# Patient Record
Sex: FEMALE | Race: WHITE | NOT HISPANIC OR LATINO | Employment: FULL TIME | URBAN - METROPOLITAN AREA
[De-identification: names, ages, dates, MRNs, and addresses within clinical notes are randomized per-mention and may not be internally consistent; named-entity substitution may affect disease eponyms.]

---

## 2018-04-21 ENCOUNTER — OFFICE VISIT (OUTPATIENT)
Dept: URGENT CARE | Facility: CLINIC | Age: 43
End: 2018-04-21
Payer: COMMERCIAL

## 2018-04-21 VITALS
BODY MASS INDEX: 34.86 KG/M2 | SYSTOLIC BLOOD PRESSURE: 122 MMHG | HEART RATE: 84 BPM | OXYGEN SATURATION: 96 % | DIASTOLIC BLOOD PRESSURE: 76 MMHG | TEMPERATURE: 99.2 F | WEIGHT: 230 LBS | HEIGHT: 68 IN

## 2018-04-21 DIAGNOSIS — J20.8 ACUTE BACTERIAL BRONCHITIS: Primary | ICD-10-CM

## 2018-04-21 DIAGNOSIS — B96.89 ACUTE BACTERIAL BRONCHITIS: Primary | ICD-10-CM

## 2018-04-21 PROCEDURE — 99203 OFFICE O/P NEW LOW 30 MIN: CPT | Performed by: FAMILY MEDICINE

## 2018-04-21 RX ORDER — LEVOTHYROXINE SODIUM 0.03 MG/1
25 TABLET ORAL DAILY
COMMUNITY

## 2018-04-21 RX ORDER — AZITHROMYCIN 250 MG/1
TABLET, FILM COATED ORAL
Qty: 6 TABLET | Refills: 0 | Status: SHIPPED | OUTPATIENT
Start: 2018-04-21 | End: 2018-04-26

## 2018-04-21 RX ORDER — ACETAMINOPHEN AND CODEINE PHOSPHATE 120; 12 MG/5ML; MG/5ML
1 SOLUTION ORAL DAILY
COMMUNITY

## 2018-04-21 RX ORDER — CABERGOLINE 0.5 MG/1
0.25 TABLET ORAL 2 TIMES WEEKLY
COMMUNITY

## 2018-04-21 NOTE — PROGRESS NOTES
[facility]        NAME: Aleisha Fox is a 38 y o  female  : 1975    MRN: [id number]  DATE: 2018  TIME: 12:04 PM    Assessment and Plan   Acute bacterial bronchitis [J20 8, B96 89]  1  Acute bacterial bronchitis  azithromycin (ZITHROMAX) 250 mg tablet         Patient Instructions     Continue plain Mucinex  Follow up with PCP in 3-5 days  Proceed to  ER if symptoms worsen  Chief Complaint     Chief Complaint   Patient presents with    Cough     x1 week          History of Present Illness       Cough   This is a new problem  The current episode started in the past 7 days  The problem has been gradually worsening  The problem occurs constantly  The cough is productive of sputum  Associated symptoms include chills, nasal congestion, postnasal drip, rhinorrhea and a sore throat  Review of Systems   Review of Systems   Constitutional: Positive for chills  HENT: Positive for postnasal drip, rhinorrhea and sore throat  Eyes: Negative  Respiratory: Positive for cough  Cardiovascular: Negative  Gastrointestinal: Negative  Musculoskeletal: Negative            Current Medications       Current Outpatient Prescriptions:     cabergoline (DOSTINEX) 0 5 MG tablet, Take 0 25 mg by mouth 2 (two) times a week, Disp: , Rfl:     levothyroxine 25 mcg tablet, Take 25 mcg by mouth daily, Disp: , Rfl:     norethindrone (MICRONOR) 0 35 MG tablet, Take 1 tablet by mouth daily, Disp: , Rfl:     azithromycin (ZITHROMAX) 250 mg tablet, Take 2 tablets today then 1 tablet daily x 4 days, Disp: 6 tablet, Rfl: 0    Current Allergies     Allergies as of 2018 - Reviewed 2018   Allergen Reaction Noted    Penicillins Hives 2018            The following portions of the patient's history were reviewed and updated as appropriate: allergies, current medications, past family history, past medical history, past social history, past surgical history and problem list      History reviewed  No pertinent past medical history  History reviewed  No pertinent surgical history  Family History   Problem Relation Age of Onset    Family history unknown: Yes         Medications have been verified  Objective   /76   Pulse 84   Temp 99 2 °F (37 3 °C) (Tympanic)   Ht 5' 8" (1 727 m)   Wt 104 kg (230 lb)   SpO2 96%   BMI 34 97 kg/m²        Physical Exam     Physical Exam   Constitutional: She is oriented to person, place, and time  She appears well-developed  HENT:   Right Ear: External ear normal    Left Ear: External ear normal    Nose: Nose normal    Mouth/Throat: Oropharynx is clear and moist  No oropharyngeal exudate  Eyes: Conjunctivae are normal    Neck: Normal range of motion  Neck supple  Cardiovascular: Normal rate, regular rhythm and normal heart sounds  No murmur heard  Pulmonary/Chest: Effort normal and breath sounds normal  No respiratory distress  She has no wheezes  She has no rales  She exhibits no tenderness  Abdominal: Soft  She exhibits no distension and no mass  There is no tenderness  There is no rebound and no guarding  Musculoskeletal: Normal range of motion  Lymphadenopathy:     She has no cervical adenopathy  Neurological: She is alert and oriented to person, place, and time  No cranial nerve deficit  Skin: Skin is warm  No rash noted  No erythema

## 2018-04-21 NOTE — PATIENT INSTRUCTIONS
Continue plain Mucinex  Follow up with PCP in 3-5 days  Proceed to  ER if symptoms worsen  Acute Bronchitis   AMBULATORY CARE:   Acute bronchitis  is swelling and irritation in the air passages of your lungs  This irritation may cause you to cough or have other breathing problems  Acute bronchitis often starts because of another illness, such as a cold or the flu  The illness spreads from your nose and throat to your windpipe and airways  Bronchitis is often called a chest cold  Acute bronchitis lasts about 3 to 6 weeks and is usually not a serious illness  Your cough can last for several weeks  You may have any of the following symptoms:   · A cough with sputum that may be clear, yellow, or green    · Feeling more tired than usual, and body aches    · A fever and chills    · Wheezing when you breathe    · A tight chest or pain when you breathe or cough  Seek care immediately if:   · You cough up blood  · Your lips or fingernails turn blue  · You feel like you are not getting enough air when you breathe  Contact your healthcare provider if:   · You have a fever  · Your breathing problems do not go away or get worse  · Your cough does not get better within 4 weeks  · You have questions or concerns about your condition or care  Self-care:   · Get more rest   Rest helps your body to heal  Slowly start to do more each day  Rest when you feel it is needed  · Avoid irritants in the air  Avoid chemicals, fumes, and dust  Wear a face mask if you must work around dust or fumes  Stay inside on days when air pollution levels are high  If you have allergies, stay inside when pollen counts are high  Do not use aerosol products, such as spray-on deodorant, bug spray, and hair spray  · Do not smoke or be around others who smoke  Nicotine and other chemicals in cigarettes and cigars damages the cilia that move mucus out of your lungs   Ask your healthcare provider for information if you currently smoke and need help to quit  E-cigarettes or smokeless tobacco still contain nicotine  Talk to your healthcare provider before you use these products  · Drink liquids as directed  Liquids help keep your air passages moist and help you cough up mucus  You may need to drink more liquids when you have acute bronchitis  Ask how much liquid to drink each day and which liquids are best for you  · Use a humidifier or vaporizer  Use a cool mist humidifier or a vaporizer to increase air moisture in your home  This may make it easier for you to breathe and help decrease your cough  Prevent acute bronchitis by doing the following:   · Get the vaccinations you need  Ask your healthcare provider if you should get vaccinated against the flu or pneumonia  · Prevent the spread of germs  You can decrease your risk of acute bronchitis and other illnesses by doing the following:     [facility] your hands often with soap and water  Carry germ-killing hand lotion or gel with you  You can use the lotion or gel to clean your hands when soap and water are not available  ¨ Do not touch your eyes, nose, or mouth unless you have washed your hands first     ¨ Always cover your mouth when you cough to prevent the spread of germs  It is best to cough into a tissue or your shirt sleeve instead of into your hand  Ask those around you cover their mouths when they cough  ¨ Try to avoid people who have a cold or the flu  If you are sick, stay away from others as much as possible  Medicines: Your healthcare provider may  give you any of the following:  · Ibuprofen or acetaminophen  are medicines that help lower your fever  They are available without a doctor's order  Ask your healthcare provider which medicine is right for you  Ask how much to take and how often to take it  Follow directions  These medicines can cause stomach bleeding if not taken correctly  Ibuprofen can cause kidney damage   Do not take ibuprofen if you have kidney disease, an ulcer, or allergies to aspirin  Acetaminophen can cause liver damage  Do not take more than 4,000 milligrams in 24 hours  · Decongestants  help loosen mucus in your lungs and make it easier to cough up  This can help you breathe easier  · Cough suppressants  decrease your urge to cough  If your cough produces mucus, do not take a cough suppressant unless your healthcare provider tells you to  Your healthcare provider may suggest that you take a cough suppressant at night so you can rest     · Inhalers  may be given  Your healthcare provider may give you one or more inhalers to help you breathe easier and cough less  An inhaler gives your medicine to open your airways  Ask your healthcare provider to show you how to use your inhaler correctly  Follow up with your healthcare provider as directed:  Write down questions you have so you will remember to ask them during your follow-up visits  © 2017 [employer] Information is for End User's use only and may not be sold, redistributed or otherwise used for commercial purposes  All illustrations and images included in CareNotes® are the copyrighted property of [employer]  or Tyshawn Araujo  The above information is an  only  It is not intended as medical advice for individual conditions or treatments  Talk to your doctor, nurse or pharmacist before following any medical regimen to see if it is safe and effective for you

## 2018-07-05 ENCOUNTER — OFFICE VISIT (OUTPATIENT)
Dept: URGENT CARE | Facility: CLINIC | Age: 43
End: 2018-07-05
Payer: COMMERCIAL

## 2018-07-05 VITALS
SYSTOLIC BLOOD PRESSURE: 128 MMHG | HEART RATE: 73 BPM | OXYGEN SATURATION: 99 % | TEMPERATURE: 99.9 F | WEIGHT: 230 LBS | DIASTOLIC BLOOD PRESSURE: 61 MMHG | HEIGHT: 68 IN | BODY MASS INDEX: 34.86 KG/M2 | RESPIRATION RATE: 18 BRPM

## 2018-07-05 DIAGNOSIS — H66.93 BILATERAL OTITIS MEDIA, UNSPECIFIED OTITIS MEDIA TYPE: Primary | ICD-10-CM

## 2018-07-05 PROCEDURE — 99213 OFFICE O/P EST LOW 20 MIN: CPT | Performed by: PHYSICIAN ASSISTANT

## 2018-07-05 RX ORDER — AZITHROMYCIN 250 MG/1
TABLET, FILM COATED ORAL
Qty: 6 TABLET | Refills: 0 | Status: SHIPPED | OUTPATIENT
Start: 2018-07-05 | End: 2018-07-09

## 2018-07-05 NOTE — PROGRESS NOTES
[facility]        NAME: Scott Vizcaino is a 38 y o  female  : 1975    MRN: [id number]  DATE: 2018  TIME: 6:29 PM    Assessment and Plan   Bilateral otitis media, unspecified otitis media type [H66 93]  1  Bilateral otitis media, unspecified otitis media type  azithromycin (ZITHROMAX) 250 mg tablet     Patient Instructions     Take antibiotic as prescribed  Follow up with PCP in 3-5 days  Proceed to  ER if symptoms worsen  Chief Complaint     Chief Complaint   Patient presents with    Cold Like Symptoms     cough and congestion resolving, now with bilateral ear pain  History of Present Illness     Earache    There is pain in both ears  This is a new problem  The current episode started today  The problem occurs constantly  The problem has been gradually worsening  There has been no fever  The pain is moderate  Associated symptoms include coughing, rhinorrhea and a sore throat  Pertinent negatives include no abdominal pain, diarrhea, ear discharge, headaches, hearing loss, neck pain, rash or vomiting  She has tried nothing for the symptoms  The treatment provided no relief  There is no history of a chronic ear infection, hearing loss or a tympanostomy tube  Review of Systems   Review of Systems   HENT: Positive for ear pain, rhinorrhea and sore throat  Negative for ear discharge and hearing loss  Respiratory: Positive for cough  Negative for apnea, choking, chest tightness, shortness of breath, wheezing and stridor  Gastrointestinal: Negative for abdominal pain, diarrhea and vomiting  Musculoskeletal: Negative for neck pain  Skin: Negative for rash  Neurological: Negative for headaches           Current Medications       Current Outpatient Prescriptions:     azithromycin (ZITHROMAX) 250 mg tablet, Take 2 tablets today then 1 tablet daily x 4 days, Disp: 6 tablet, Rfl: 0    cabergoline (DOSTINEX) 0 5 MG tablet, Take 0 25 mg by mouth 2 (two) times a week, Disp: , Rfl:   levothyroxine 25 mcg tablet, Take 25 mcg by mouth daily, Disp: , Rfl:     norethindrone (MICRONOR) 0 35 MG tablet, Take 1 tablet by mouth daily, Disp: , Rfl:     Current Allergies     Allergies as of 07/05/2018 - Reviewed 07/05/2018   Allergen Reaction Noted    Penicillins Hives 04/21/2018            The following portions of the patient's history were reviewed and updated as appropriate: allergies, current medications, past family history, past medical history, past social history, past surgical history and problem list      Past Medical History:   Diagnosis Date    Disease of thyroid gland     Pulmonary embolism ([address] )        History reviewed  No pertinent surgical history  Family History   Problem Relation Age of Onset    Family history unknown: Yes         Medications have been verified  Objective   /61   Pulse 73   Temp 99 9 °F (37 7 °C) (Tympanic)   Resp 18   Ht 5' 8" (1 727 m)   Wt 104 kg (230 lb)   LMP 06/20/2018   SpO2 99%   BMI 34 97 kg/m²        Physical Exam     Physical Exam   Constitutional: She appears well-developed and well-nourished  HENT:   Head: Normocephalic  Right Ear: External ear normal  No drainage, swelling or tenderness  Tympanic membrane is erythematous and bulging  Left Ear: External ear normal  No drainage, swelling or tenderness  Tympanic membrane is erythematous and bulging  Nose: Mucosal edema and rhinorrhea present  Mouth/Throat: Oropharynx is clear and moist  No oropharyngeal exudate  Cardiovascular: Normal rate, regular rhythm, normal heart sounds and intact distal pulses  Exam reveals no gallop and no friction rub  No murmur heard  Pulmonary/Chest: Effort normal and breath sounds normal  No respiratory distress  She has no wheezes  She has no rales  Abdominal: Soft  Bowel sounds are normal  She exhibits no distension  There is no tenderness  There is no rebound

## 2018-07-10 ENCOUNTER — OFFICE VISIT (OUTPATIENT)
Dept: URGENT CARE | Facility: CLINIC | Age: 43
End: 2018-07-10
Payer: COMMERCIAL

## 2018-07-10 VITALS
SYSTOLIC BLOOD PRESSURE: 132 MMHG | TEMPERATURE: 100 F | RESPIRATION RATE: 18 BRPM | WEIGHT: 229.28 LBS | HEIGHT: 68 IN | HEART RATE: 85 BPM | BODY MASS INDEX: 34.75 KG/M2 | DIASTOLIC BLOOD PRESSURE: 74 MMHG | OXYGEN SATURATION: 99 %

## 2018-07-10 DIAGNOSIS — J20.9 ACUTE BRONCHITIS, UNSPECIFIED ORGANISM: Primary | ICD-10-CM

## 2018-07-10 PROCEDURE — 99213 OFFICE O/P EST LOW 20 MIN: CPT | Performed by: PHYSICIAN ASSISTANT

## 2018-07-10 RX ORDER — PREDNISONE 50 MG/1
50 TABLET ORAL DAILY
Qty: 5 TABLET | Refills: 0 | Status: SHIPPED | OUTPATIENT
Start: 2018-07-10 | End: 2018-07-15

## 2018-07-10 RX ORDER — DOXYCYCLINE 100 MG/1
100 TABLET ORAL 2 TIMES DAILY
Qty: 20 TABLET | Refills: 0 | Status: SHIPPED | OUTPATIENT
Start: 2018-07-10 | End: 2018-07-20

## 2018-07-10 RX ORDER — ALBUTEROL SULFATE 90 UG/1
1 AEROSOL, METERED RESPIRATORY (INHALATION) EVERY 4 HOURS PRN
Qty: 1 INHALER | Refills: 0 | Status: SHIPPED | OUTPATIENT
Start: 2018-07-10 | End: 2018-07-20

## 2018-07-10 NOTE — PROGRESS NOTES
[facility]        NAME: Kizzy Love is a 38 y o  female  : 1975    MRN: [id number]  DATE: July 10, 2018  TIME: 2:22 PM    Assessment and Plan   Acute bronchitis, unspecified organism [J20 9]  1  Acute bronchitis, unspecified organism  doxycycline (ADOXA) 100 MG tablet    predniSONE 50 mg tablet    albuterol (PROVENTIL HFA,VENTOLIN HFA) 90 mcg/act inhaler     Patient Instructions     Take medicine as prescribed  Follow up with PCP in 3-5 days  Proceed to  ER if symptoms worsen  Chief Complaint     Chief Complaint   Patient presents with    Cold Like Symptoms     cough, fever, chills     History of Present Illness       Cough   This is a new problem  The current episode started yesterday  The problem has been gradually worsening  The problem occurs every few minutes  The cough is non-productive  Associated symptoms include nasal congestion, rhinorrhea and wheezing  Pertinent negatives include no chest pain, chills, ear congestion, ear pain, fever, headaches, heartburn, hemoptysis, myalgias, postnasal drip, rash, sore throat, shortness of breath, sweats or weight loss  Nothing aggravates the symptoms  She has tried nothing for the symptoms  The treatment provided moderate relief  There is no history of asthma, bronchiectasis, bronchitis, COPD, emphysema, environmental allergies or pneumonia  Review of Systems   Review of Systems   Constitutional: Negative for chills, fever and weight loss  HENT: Positive for congestion and rhinorrhea  Negative for ear pain, postnasal drip and sore throat  Respiratory: Positive for cough, chest tightness and wheezing  Negative for apnea, hemoptysis, choking, shortness of breath and stridor  Cardiovascular: Negative for chest pain  Gastrointestinal: Negative for heartburn  Musculoskeletal: Negative for myalgias  Skin: Negative for rash  Allergic/Immunologic: Negative for environmental allergies  Neurological: Negative for headaches  Current Medications       Current Outpatient Prescriptions:     albuterol (PROVENTIL HFA,VENTOLIN HFA) 90 mcg/act inhaler, Inhale 1 puff every 4 (four) hours as needed for wheezing for up to 10 days, Disp: 1 Inhaler, Rfl: 0    cabergoline (DOSTINEX) 0 5 MG tablet, Take 0 25 mg by mouth 2 (two) times a week, Disp: , Rfl:     doxycycline (ADOXA) 100 MG tablet, Take 1 tablet (100 mg total) by mouth 2 (two) times a day for 10 days, Disp: 20 tablet, Rfl: 0    levothyroxine 25 mcg tablet, Take 25 mcg by mouth daily, Disp: , Rfl:     norethindrone (MICRONOR) 0 35 MG tablet, Take 1 tablet by mouth daily, Disp: , Rfl:     predniSONE 50 mg tablet, Take 1 tablet (50 mg total) by mouth daily for 5 days, Disp: 5 tablet, Rfl: 0    Current Allergies     Allergies as of 07/10/2018 - Reviewed 07/10/2018   Allergen Reaction Noted    Penicillins Hives 04/21/2018            The following portions of the patient's history were reviewed and updated as appropriate: allergies, current medications, past family history, past medical history, past social history, past surgical history and problem list      Past Medical History:   Diagnosis Date    Disease of thyroid gland     Pulmonary embolism ([address] )        History reviewed  No pertinent surgical history  Family History   Problem Relation Age of Onset    Family history unknown: Yes         Medications have been verified  Objective   /74   Pulse 85   Temp 100 °F (37 8 °C) (Tympanic)   Resp 18   Ht 5' 8" (1 727 m)   Wt 104 kg (229 lb 4 5 oz)   LMP 06/20/2018   SpO2 99%   BMI 34 86 kg/m²        Physical Exam     Physical Exam   Constitutional: She appears well-developed and well-nourished  HENT:   Head: Normocephalic  Right Ear: Hearing, tympanic membrane, external ear and ear canal normal    Left Ear: Hearing, tympanic membrane, external ear and ear canal normal    Nose: Mucosal edema and rhinorrhea present     Mouth/Throat: Posterior oropharyngeal erythema present  No oropharyngeal exudate or posterior oropharyngeal edema  Cardiovascular: Normal rate, regular rhythm, normal heart sounds and intact distal pulses  Exam reveals no gallop and no friction rub  No murmur heard  Pulmonary/Chest: Effort normal  No respiratory distress  She has no decreased breath sounds  She has wheezes (diffuse)  She has no rhonchi  She has no rales  She exhibits no tenderness  Moist cough   Abdominal: Soft  Bowel sounds are normal  She exhibits no distension  There is no tenderness  There is no rebound and no guarding

## 2018-08-19 ENCOUNTER — OFFICE VISIT (OUTPATIENT)
Dept: URGENT CARE | Facility: CLINIC | Age: 43
End: 2018-08-19
Payer: COMMERCIAL

## 2018-08-19 VITALS
SYSTOLIC BLOOD PRESSURE: 154 MMHG | OXYGEN SATURATION: 98 % | RESPIRATION RATE: 18 BRPM | HEIGHT: 68 IN | WEIGHT: 229 LBS | TEMPERATURE: 99.1 F | HEART RATE: 59 BPM | BODY MASS INDEX: 34.71 KG/M2 | DIASTOLIC BLOOD PRESSURE: 74 MMHG

## 2018-08-19 DIAGNOSIS — S56.011S: Primary | ICD-10-CM

## 2018-08-19 PROCEDURE — 99213 OFFICE O/P EST LOW 20 MIN: CPT | Performed by: FAMILY MEDICINE

## 2018-08-19 RX ORDER — CELECOXIB 200 MG/1
200 CAPSULE ORAL DAILY
Qty: 14 CAPSULE | Refills: 0 | Status: SHIPPED | OUTPATIENT
Start: 2018-08-19 | End: 2021-01-26

## 2018-08-19 NOTE — PROGRESS NOTES
Assessment/Plan:     I recommend a thumb spica splint  Rest the thumbs much as possible  Follow up with family doctor if not a lot better in 2 weeks  Consider orthopedic referral      Diagnoses and all orders for this visit:    Strain of flexor muscle, fascia and tendon of right thumb at forearm level, sequela  -     celecoxib (CeleBREX) 200 mg capsule; Take 1 capsule (200 mg total) by mouth daily for 14 days          Subjective:      Patient ID: Stevo Mims is a 38 y o  female  Complains of pain in the radial aspect of the right wrist at the radiocarpal junction  This is been going on for about 2 weeks  No known trauma  She work on a computer 8 hours a day  She has been doing any heavy lifting  The following portions of the patient's history were reviewed and updated as appropriate: allergies, current medications, past family history, past medical history, past social history, past surgical history and problem list     Review of Systems   Constitutional: Negative  HENT: Negative  Eyes: Negative  Respiratory: Negative  Cardiovascular: Negative  Gastrointestinal: Negative  Endocrine: Negative  Genitourinary: Negative  Musculoskeletal: Positive for arthralgias and joint swelling  Skin: Negative  Allergic/Immunologic: Negative  Neurological: Negative  Hematological: Negative  Psychiatric/Behavioral: Negative  All other systems reviewed and are negative  Objective:      /74   Pulse 59   Temp 99 1 °F (37 3 °C) (Tympanic)   Resp 18   Ht 5' 8" (1 727 m)   Wt 104 kg (229 lb)   SpO2 98%   BMI 34 82 kg/m²          Physical Exam   Constitutional: She is oriented to person, place, and time  She appears well-developed and well-nourished  HENT:   Head: Normocephalic and atraumatic     Right Ear: External ear normal    Left Ear: External ear normal    Nose: Nose normal    Mouth/Throat: Oropharynx is clear and moist    Eyes: Conjunctivae and EOM are normal  Pupils are equal, round, and reactive to light  Neck: Normal range of motion  Neck supple  Cardiovascular: Normal rate, regular rhythm and normal heart sounds  Pulmonary/Chest: Effort normal and breath sounds normal    Abdominal: Soft  Bowel sounds are normal    Musculoskeletal: Normal range of motion  There is tenderness over the right first MTP and radial carpal junction  Neurological: She is alert and oriented to person, place, and time  She has normal reflexes  Skin: Skin is warm and dry  Psychiatric: She has a normal mood and affect  Her behavior is normal    Nursing note and vitals reviewed

## 2020-02-22 ENCOUNTER — OFFICE VISIT (OUTPATIENT)
Dept: URGENT CARE | Facility: CLINIC | Age: 45
End: 2020-02-22
Payer: COMMERCIAL

## 2020-02-22 VITALS
RESPIRATION RATE: 16 BRPM | DIASTOLIC BLOOD PRESSURE: 82 MMHG | HEIGHT: 67 IN | WEIGHT: 245 LBS | TEMPERATURE: 99.6 F | BODY MASS INDEX: 38.45 KG/M2 | HEART RATE: 70 BPM | SYSTOLIC BLOOD PRESSURE: 130 MMHG | OXYGEN SATURATION: 97 %

## 2020-02-22 DIAGNOSIS — R51.9 SINUS HEADACHE: Primary | ICD-10-CM

## 2020-02-22 PROCEDURE — 99213 OFFICE O/P EST LOW 20 MIN: CPT | Performed by: EMERGENCY MEDICINE

## 2020-02-22 RX ORDER — PREDNISONE 10 MG/1
TABLET ORAL
Qty: 27 TABLET | Refills: 0 | Status: SHIPPED | OUTPATIENT
Start: 2020-02-22 | End: 2021-01-26

## 2020-02-22 NOTE — PATIENT INSTRUCTIONS
Take an expectorant - guaifenesin should be the only ingredient - during the day, and the cough suppressant (ex  Robitussin DM or Tessalon) if needed at night only  Take Zinc 12 5 to 15 mg every 2 - 3 hrs while awake for the next few days  You may take Cold Kuldeep (13 3 mg of Zinc) or split a 25 mg Zinc tablet or lozenge in two or a 50 mg into four to get the proper dose  The total daily dose of Zinc should exceed 75 mg per day  You may also take a decongestant like Sudafed, unless you have hypertension or cardiac disease  Hold any NSAIDs like Ibuprofen (Advil), Naprosyn (Aleve), etc while on steroids like Medrol or Prednisone  If you are diabetic, you should also adhere strictly to your diet and monitor your blood sugar closely while on the steroids as discussed  Acute Headache   WHAT YOU NEED TO KNOW:   An acute headache is pain or discomfort that starts suddenly and gets worse quickly  You may have an acute headache only when you feel stress or eat certain foods  Other acute headache pain can happen every day, and sometimes several times a day  DISCHARGE INSTRUCTIONS:   Return to the emergency department if:   · You have severe pain  · You have numbness or weakness on one side of your face or body  · You have a headache that occurs after a blow to the head, a fall, or other trauma  · You have a headache, are forgetful or confused, or have trouble speaking  · You have a headache, stiff neck, and a fever  Contact your healthcare provider if:   · You have a constant headache and are vomiting  · You have a headache each day that does not get better, even after treatment  · You have changes in your headaches, or new symptoms that occur when you have a headache  · You have questions or concerns about your condition or care  Medicines: You may need any of the following:  · Prescription pain medicine  may be given   The medicine your healthcare provider recommends will depend on the kind of headaches you have  You will need to take prescription headache medicines as directed to prevent a problem called rebound headache  These headaches happen with regular use of pain relievers for headache disorders  · NSAIDs , such as ibuprofen, help decrease swelling, pain, and fever  This medicine is available with or without a doctor's order  NSAIDs can cause stomach bleeding or kidney problems in certain people  If you take blood thinner medicine, always ask your healthcare provider if NSAIDs are safe for you  Always read the medicine label and follow directions  · Acetaminophen  decreases pain and fever  It is available without a doctor's order  Ask how much to take and how often to take it  Follow directions  Read the labels of all other medicines you are using to see if they also contain acetaminophen, or ask your doctor or pharmacist  Acetaminophen can cause liver damage if not taken correctly  Do not use more than 3 grams (3,000 milligrams) total of acetaminophen in one day  · Antidepressants  may be given for some kinds of headaches  · Take your medicine as directed  Contact your healthcare provider if you think your medicine is not helping or if you have side effects  Tell him or her if you are allergic to any medicine  Keep a list of the medicines, vitamins, and herbs you take  Include the amounts, and when and why you take them  Bring the list or the pill bottles to follow-up visits  Carry your medicine list with you in case of an emergency  Manage your symptoms:   · Apply heat or ice  on the headache area  Use a heat or ice pack  For an ice pack, you can also put crushed ice in a plastic bag  Cover the pack or bag with a towel before you apply it to your skin  Ice and heat both help decrease pain, and heat also helps decrease muscle spasms  Apply heat for 20 to 30 minutes every 2 hours  Apply ice for 15 to 20 minutes every hour   Apply heat or ice for as long and for as many days as directed  You may alternate heat and ice  · Relax your muscles  Lie down in a comfortable position and close your eyes  Relax your muscles slowly  Start at your toes and work your way up your body  · Keep a record of your headaches  Write down when your headaches start and stop  Include your symptoms and what you were doing when the headache began  Record what you ate or drank for 24 hours before the headache started  Describe the pain and where it hurts  Keep track of what you did to treat your headache and if it worked  Prevent an acute headache:   · Avoid anything that triggers an acute headache  Examples include exposure to chemicals, going to high altitude, or not getting enough sleep  Create a regular sleep routine  Go to sleep at the same time and wake up at the same time each day  Do not use electronic devices before bedtime  These may trigger a headache or prevent you from sleeping well  · Do not smoke  Nicotine and other chemicals in cigarettes and cigars can trigger an acute headache or make it worse  Ask your healthcare provider for information if you currently smoke and need help to quit  E-cigarettes or smokeless tobacco still contain nicotine  Talk to your healthcare provider before you use these products  · Limit alcohol as directed  Alcohol can trigger an acute headache or make it worse  If you have cluster headaches, do not drink alcohol during an episode  For other types of headaches, ask your healthcare provider if it is safe for you to drink alcohol  Ask how much is safe for you to drink, and how often  · Exercise as directed  Exercise can reduce tension and help with headache pain  Aim for 30 minutes of physical activity on most days of the week  Your healthcare provider can help you create an exercise plan  · Eat a variety of healthy foods  Healthy foods include fruits, vegetables, low-fat dairy products, lean meats, fish, whole grains, and cooked beans   Your healthcare provider or dietitian can help you create meals plans if you need to avoid foods that trigger headaches  Follow up with your healthcare provider as directed:  Bring your headache record with you when you see your healthcare provider  Write down your questions so you remember to ask them during your visits  © 2017 [employer] Information is for End User's use only and may not be sold, redistributed or otherwise used for commercial purposes  All illustrations and images included in CareNotes® are the copyrighted property of [employer]  or Tyshawn Araujo  The above information is an  only  It is not intended as medical advice for individual conditions or treatments  Talk to your doctor, nurse or pharmacist before following any medical regimen to see if it is safe and effective for you

## 2020-02-22 NOTE — PROGRESS NOTES
[facility]        NAME: Heide Haskins is a 53 y o  female  : 1975    MRN: [id number]  DATE: 2020  TIME: 9:07 AM    Assessment and Plan   Sinus headache [R51]  1  Sinus headache  predniSONE 10 mg tablet     I offered to send patient to the ER for further evaluation  She does not want to go to the ER at this time  Patient Instructions     Patient Instructions   Take an expectorant - guaifenesin should be the only ingredient - during the day, and the cough suppressant (ex  Robitussin DM or Tessalon) if needed at night only  Take Zinc 12 5 to 15 mg every 2 - 3 hrs while awake for the next few days  You may take Cold Kuldeep (13 3 mg of Zinc) or split a 25 mg Zinc tablet or lozenge in two or a 50 mg into four to get the proper dose  The total daily dose of Zinc should exceed 75 mg per day  You may also take a decongestant like Sudafed, unless you have hypertension or cardiac disease  Hold any NSAIDs like Ibuprofen (Advil), Naprosyn (Aleve), etc while on steroids like Medrol or Prednisone  If you are diabetic, you should also adhere strictly to your diet and monitor your blood sugar closely while on the steroids as discussed  Acute Headache   WHAT YOU NEED TO KNOW:   An acute headache is pain or discomfort that starts suddenly and gets worse quickly  You may have an acute headache only when you feel stress or eat certain foods  Other acute headache pain can happen every day, and sometimes several times a day  DISCHARGE INSTRUCTIONS:   Return to the emergency department if:   · You have severe pain  · You have numbness or weakness on one side of your face or body  · You have a headache that occurs after a blow to the head, a fall, or other trauma  · You have a headache, are forgetful or confused, or have trouble speaking  · You have a headache, stiff neck, and a fever  Contact your healthcare provider if:   · You have a constant headache and are vomiting      · You have a headache each day that does not get better, even after treatment  · You have changes in your headaches, or new symptoms that occur when you have a headache  · You have questions or concerns about your condition or care  Medicines: You may need any of the following:  · Prescription pain medicine  may be given  The medicine your healthcare provider recommends will depend on the kind of headaches you have  You will need to take prescription headache medicines as directed to prevent a problem called rebound headache  These headaches happen with regular use of pain relievers for headache disorders  · NSAIDs , such as ibuprofen, help decrease swelling, pain, and fever  This medicine is available with or without a doctor's order  NSAIDs can cause stomach bleeding or kidney problems in certain people  If you take blood thinner medicine, always ask your healthcare provider if NSAIDs are safe for you  Always read the medicine label and follow directions  · Acetaminophen  decreases pain and fever  It is available without a doctor's order  Ask how much to take and how often to take it  Follow directions  Read the labels of all other medicines you are using to see if they also contain acetaminophen, or ask your doctor or pharmacist  Acetaminophen can cause liver damage if not taken correctly  Do not use more than 3 grams (3,000 milligrams) total of acetaminophen in one day  · Antidepressants  may be given for some kinds of headaches  · Take your medicine as directed  Contact your healthcare provider if you think your medicine is not helping or if you have side effects  Tell him or her if you are allergic to any medicine  Keep a list of the medicines, vitamins, and herbs you take  Include the amounts, and when and why you take them  Bring the list or the pill bottles to follow-up visits  Carry your medicine list with you in case of an emergency    Manage your symptoms:   · Apply heat or ice  on the headache area  Use a heat or ice pack  For an ice pack, you can also put crushed ice in a plastic bag  Cover the pack or bag with a towel before you apply it to your skin  Ice and heat both help decrease pain, and heat also helps decrease muscle spasms  Apply heat for 20 to 30 minutes every 2 hours  Apply ice for 15 to 20 minutes every hour  Apply heat or ice for as long and for as many days as directed  You may alternate heat and ice  · Relax your muscles  Lie down in a comfortable position and close your eyes  Relax your muscles slowly  Start at your toes and work your way up your body  · Keep a record of your headaches  Write down when your headaches start and stop  Include your symptoms and what you were doing when the headache began  Record what you ate or drank for 24 hours before the headache started  Describe the pain and where it hurts  Keep track of what you did to treat your headache and if it worked  Prevent an acute headache:   · Avoid anything that triggers an acute headache  Examples include exposure to chemicals, going to high altitude, or not getting enough sleep  Create a regular sleep routine  Go to sleep at the same time and wake up at the same time each day  Do not use electronic devices before bedtime  These may trigger a headache or prevent you from sleeping well  · Do not smoke  Nicotine and other chemicals in cigarettes and cigars can trigger an acute headache or make it worse  Ask your healthcare provider for information if you currently smoke and need help to quit  E-cigarettes or smokeless tobacco still contain nicotine  Talk to your healthcare provider before you use these products  · Limit alcohol as directed  Alcohol can trigger an acute headache or make it worse  If you have cluster headaches, do not drink alcohol during an episode  For other types of headaches, ask your healthcare provider if it is safe for you to drink alcohol   Ask how much is safe for you to drink, and how often  · Exercise as directed  Exercise can reduce tension and help with headache pain  Aim for 30 minutes of physical activity on most days of the week  Your healthcare provider can help you create an exercise plan  · Eat a variety of healthy foods  Healthy foods include fruits, vegetables, low-fat dairy products, lean meats, fish, whole grains, and cooked beans  Your healthcare provider or dietitian can help you create meals plans if you need to avoid foods that trigger headaches  Follow up with your healthcare provider as directed:  Bring your headache record with you when you see your healthcare provider  Write down your questions so you remember to ask them during your visits  © 2017 [employer] Information is for End User's use only and may not be sold, redistributed or otherwise used for commercial purposes  All illustrations and images included in CareNotes® are the copyrighted property of [employer]  or Tyshawn Araujo  The above information is an  only  It is not intended as medical advice for individual conditions or treatments  Talk to your doctor, nurse or pharmacist before following any medical regimen to see if it is safe and effective for you  Follow up with PCP in 3-5 days  Proceed to  ER if symptoms worsen  Chief Complaint     Chief Complaint   Patient presents with    Headache     c/o 2 week hx int headache that started on top of head now c/o facial pain and pressure         History of Present Illness       Patient complains of waxing and waning bifrontal and bimaxillary headache for the past 2 weeks  She claims no relief with over-the-counter sinus medicines  She does not believe this is the worst headache of her life  She denies any recent head trauma  She denies stiff neck, fever or chills  Review of Systems   Review of Systems   Constitutional: Negative for activity change, chills, fatigue and fever  HENT: Positive for congestion and sinus pressure  Negative for sore throat  Eyes: Negative for photophobia  Respiratory: Negative for shortness of breath  Gastrointestinal: Negative for abdominal pain, blood in stool, nausea and vomiting  Endocrine: Negative for cold intolerance, heat intolerance, polydipsia, polyphagia and polyuria  Genitourinary: Negative for hematuria  Musculoskeletal: Negative for arthralgias, back pain, joint swelling, myalgias, neck pain and neck stiffness  Skin: Negative for color change, rash and wound  Neurological: Positive for headaches  Negative for dizziness, syncope, weakness and light-headedness           Current Medications       Current Outpatient Medications:     cabergoline (DOSTINEX) 0 5 MG tablet, Take 0 25 mg by mouth 2 (two) times a week, Disp: , Rfl:     levothyroxine 25 mcg tablet, Take 25 mcg by mouth daily, Disp: , Rfl:     norethindrone (MICRONOR) 0 35 MG tablet, Take 1 tablet by mouth daily, Disp: , Rfl:     celecoxib (CeleBREX) 200 mg capsule, Take 1 capsule (200 mg total) by mouth daily for 14 days, Disp: 14 capsule, Rfl: 0    predniSONE 10 mg tablet, Take once daily all days pills on this schedule 6- 6- 5- 4- 3- 2- 1, Disp: 27 tablet, Rfl: 0    Current Allergies     Allergies as of 02/22/2020 - Reviewed 02/22/2020   Allergen Reaction Noted    Penicillins Hives 04/21/2018            The following portions of the patient's history were reviewed and updated as appropriate: allergies, current medications, past family history, past medical history, past social history, past surgical history and problem list      Past Medical History:   Diagnosis Date    Disease of thyroid gland     Pituitary [facility] [address] [facility]     Pulmonary embolism ([facility] [address] )        Past Surgical History:   Procedure Laterality Date    NO PAST SURGERIES         Family History   Problem Relation Age of Onset    Diabetes Mother     No Known Problems Father          Medications have been verified  Objective   /82   Pulse 70   Temp 99 6 °F (37 6 °C) (Tympanic)   Resp 16   Ht 5' 7" (1 702 m)   Wt 111 kg (245 lb)   SpO2 97%   BMI 38 37 kg/m²        Physical Exam     Physical Exam   Constitutional: She is oriented to person, place, and time  She appears well-developed and well-nourished  No distress  HENT:   Head: Normocephalic and atraumatic  Right Ear: Tympanic membrane and external ear normal    Left Ear: Tympanic membrane and external ear normal    Nose: Mucosal edema present  Mouth/Throat: Posterior oropharyngeal erythema present  No oropharyngeal exudate or tonsillar abscesses  Eyes: Pupils are equal, round, and reactive to light  Conjunctivae are normal    Fundoscopic exam:       The right eye shows no hemorrhage and no papilledema  The left eye shows no hemorrhage and no papilledema  Discs flat with sharp borders, no hemorrhages or exudates  Neck: Neck supple  Cardiovascular: Normal rate and regular rhythm  Pulmonary/Chest: Effort normal    Abdominal: Soft  Bowel sounds are normal    Neurological: She is alert and oriented to person, place, and time  Skin: Skin is warm and dry  Nursing note and vitals reviewed

## 2021-01-10 ENCOUNTER — HOSPITAL ENCOUNTER (EMERGENCY)
Facility: HOSPITAL | Age: 46
Discharge: HOME | End: 2021-01-10
Attending: EMERGENCY MEDICINE | Admitting: EMERGENCY MEDICINE
Payer: COMMERCIAL

## 2021-01-10 ENCOUNTER — APPOINTMENT (EMERGENCY)
Dept: CT IMAGING | Facility: HOSPITAL | Age: 46
End: 2021-01-10
Payer: COMMERCIAL

## 2021-01-10 ENCOUNTER — APPOINTMENT (EMERGENCY)
Dept: RADIOLOGY | Facility: HOSPITAL | Age: 46
End: 2021-01-10
Payer: COMMERCIAL

## 2021-01-10 VITALS
WEIGHT: 228.18 LBS | HEART RATE: 79 BPM | RESPIRATION RATE: 18 BRPM | BODY MASS INDEX: 35.74 KG/M2 | SYSTOLIC BLOOD PRESSURE: 128 MMHG | OXYGEN SATURATION: 97 % | TEMPERATURE: 98.8 F | DIASTOLIC BLOOD PRESSURE: 74 MMHG

## 2021-01-10 DIAGNOSIS — R79.89 ELEVATED BRAIN NATRIURETIC PEPTIDE (BNP) LEVEL: ICD-10-CM

## 2021-01-10 DIAGNOSIS — E87.6 HYPOKALEMIA: ICD-10-CM

## 2021-01-10 DIAGNOSIS — R79.89 ELEVATED D-DIMER: ICD-10-CM

## 2021-01-10 DIAGNOSIS — K80.20 GALLSTONE: ICD-10-CM

## 2021-01-10 DIAGNOSIS — J12.82 PNEUMONIA DUE TO COVID-19 VIRUS: Primary | ICD-10-CM

## 2021-01-10 DIAGNOSIS — U07.1 PNEUMONIA DUE TO COVID-19 VIRUS: Primary | ICD-10-CM

## 2021-01-10 LAB
ALBUMIN SERPL BCP-MCNC: 2.7 G/DL (ref 3.5–5)
ALP SERPL-CCNC: 55 U/L (ref 46–116)
ALT SERPL W P-5'-P-CCNC: 31 U/L (ref 12–78)
ANION GAP SERPL CALCULATED.3IONS-SCNC: 9 MMOL/L (ref 4–13)
AST SERPL W P-5'-P-CCNC: 18 U/L (ref 5–45)
BASOPHILS # BLD MANUAL: 0 THOUSAND/UL (ref 0–0.1)
BASOPHILS NFR MAR MANUAL: 0 % (ref 0–1)
BILIRUB SERPL-MCNC: 0.4 MG/DL (ref 0.2–1)
BUN SERPL-MCNC: 20 MG/DL (ref 5–25)
CA-I BLD-SCNC: 1.11 MMOL/L (ref 1.12–1.32)
CALCIUM ALBUM COR SERPL-MCNC: 9.4 MG/DL (ref 8.3–10.1)
CALCIUM SERPL-MCNC: 8.4 MG/DL (ref 8.3–10.1)
CHLORIDE SERPL-SCNC: 104 MMOL/L (ref 100–108)
CK SERPL-CCNC: 56 U/L (ref 26–192)
CO2 SERPL-SCNC: 21 MMOL/L (ref 21–32)
CREAT SERPL-MCNC: 1.03 MG/DL (ref 0.6–1.3)
CRP SERPL QL: 33.4 MG/L
D DIMER PPP FEU-MCNC: 2.08 UG/ML FEU
EOSINOPHIL # BLD MANUAL: 0 THOUSAND/UL (ref 0–0.4)
EOSINOPHIL NFR BLD MANUAL: 0 % (ref 0–6)
ERYTHROCYTE [DISTWIDTH] IN BLOOD BY AUTOMATED COUNT: 13 % (ref 11.6–15.1)
GFR SERPL CREATININE-BSD FRML MDRD: 65 ML/MIN/1.73SQ M
GLUCOSE SERPL-MCNC: 289 MG/DL (ref 65–140)
HCT VFR BLD AUTO: 44 % (ref 34.8–46.1)
HGB BLD-MCNC: 14.9 G/DL (ref 11.5–15.4)
INR PPP: 0.95 (ref 0.84–1.19)
LACTATE SERPL-SCNC: 1.7 MMOL/L (ref 0.5–2)
LACTATE SERPL-SCNC: 2.1 MMOL/L (ref 0.5–2)
LYMPHOCYTES # BLD AUTO: 2.06 THOUSAND/UL (ref 0.6–4.47)
LYMPHOCYTES # BLD AUTO: 27 % (ref 14–44)
MAGNESIUM SERPL-MCNC: 2.2 MG/DL (ref 1.6–2.6)
MCH RBC QN AUTO: 29 PG (ref 26.8–34.3)
MCHC RBC AUTO-ENTMCNC: 33.9 G/DL (ref 31.4–37.4)
MCV RBC AUTO: 86 FL (ref 82–98)
METAMYELOCYTES NFR BLD MANUAL: 2 % (ref 0–1)
MONOCYTES # BLD AUTO: 0.84 THOUSAND/UL (ref 0–1.22)
MONOCYTES NFR BLD: 11 % (ref 4–12)
NEUTROPHILS # BLD MANUAL: 4.57 THOUSAND/UL (ref 1.85–7.62)
NEUTS BAND NFR BLD MANUAL: 1 % (ref 0–8)
NEUTS SEG NFR BLD AUTO: 59 % (ref 43–75)
NRBC BLD AUTO-RTO: 0 /100 WBCS
NT-PROBNP SERPL-MCNC: 181 PG/ML
PLATELET # BLD AUTO: 353 THOUSANDS/UL (ref 149–390)
PLATELET BLD QL SMEAR: ADEQUATE
PLATELET CLUMP BLD QL SMEAR: PRESENT
PMV BLD AUTO: 9.8 FL (ref 8.9–12.7)
POTASSIUM SERPL-SCNC: 3.2 MMOL/L (ref 3.5–5.3)
PROT SERPL-MCNC: 7.3 G/DL (ref 6.4–8.2)
PROTHROMBIN TIME: 12.5 SECONDS (ref 11.6–14.5)
RBC # BLD AUTO: 5.14 MILLION/UL (ref 3.81–5.12)
RBC MORPH BLD: NORMAL
SODIUM SERPL-SCNC: 134 MMOL/L (ref 136–145)
TOTAL CELLS COUNTED SPEC: 100
TRIGL SERPL-MCNC: 151 MG/DL
TROPONIN I SERPL-MCNC: <0.02 NG/ML
WBC # BLD AUTO: 7.62 THOUSAND/UL (ref 4.31–10.16)

## 2021-01-10 PROCEDURE — 99285 EMERGENCY DEPT VISIT HI MDM: CPT | Performed by: EMERGENCY MEDICINE

## 2021-01-10 PROCEDURE — 86140 C-REACTIVE PROTEIN: CPT | Performed by: EMERGENCY MEDICINE

## 2021-01-10 PROCEDURE — 85379 FIBRIN DEGRADATION QUANT: CPT | Performed by: EMERGENCY MEDICINE

## 2021-01-10 PROCEDURE — 99285 EMERGENCY DEPT VISIT HI MDM: CPT

## 2021-01-10 PROCEDURE — 96374 THER/PROPH/DIAG INJ IV PUSH: CPT

## 2021-01-10 PROCEDURE — 71045 X-RAY EXAM CHEST 1 VIEW: CPT

## 2021-01-10 PROCEDURE — 85007 BL SMEAR W/DIFF WBC COUNT: CPT | Performed by: EMERGENCY MEDICINE

## 2021-01-10 PROCEDURE — 82550 ASSAY OF CK (CPK): CPT | Performed by: EMERGENCY MEDICINE

## 2021-01-10 PROCEDURE — 82728 ASSAY OF FERRITIN: CPT | Performed by: EMERGENCY MEDICINE

## 2021-01-10 PROCEDURE — 71275 CT ANGIOGRAPHY CHEST: CPT

## 2021-01-10 PROCEDURE — 84478 ASSAY OF TRIGLYCERIDES: CPT | Performed by: EMERGENCY MEDICINE

## 2021-01-10 PROCEDURE — 84484 ASSAY OF TROPONIN QUANT: CPT | Performed by: EMERGENCY MEDICINE

## 2021-01-10 PROCEDURE — 87040 BLOOD CULTURE FOR BACTERIA: CPT | Performed by: EMERGENCY MEDICINE

## 2021-01-10 PROCEDURE — 85610 PROTHROMBIN TIME: CPT | Performed by: EMERGENCY MEDICINE

## 2021-01-10 PROCEDURE — 85027 COMPLETE CBC AUTOMATED: CPT | Performed by: EMERGENCY MEDICINE

## 2021-01-10 PROCEDURE — 80053 COMPREHEN METABOLIC PANEL: CPT | Performed by: EMERGENCY MEDICINE

## 2021-01-10 PROCEDURE — 83605 ASSAY OF LACTIC ACID: CPT | Performed by: EMERGENCY MEDICINE

## 2021-01-10 PROCEDURE — 36415 COLL VENOUS BLD VENIPUNCTURE: CPT | Performed by: EMERGENCY MEDICINE

## 2021-01-10 PROCEDURE — 93005 ELECTROCARDIOGRAM TRACING: CPT

## 2021-01-10 PROCEDURE — 82330 ASSAY OF CALCIUM: CPT | Performed by: EMERGENCY MEDICINE

## 2021-01-10 PROCEDURE — G1004 CDSM NDSC: HCPCS

## 2021-01-10 PROCEDURE — 96361 HYDRATE IV INFUSION ADD-ON: CPT

## 2021-01-10 PROCEDURE — 83735 ASSAY OF MAGNESIUM: CPT | Performed by: EMERGENCY MEDICINE

## 2021-01-10 PROCEDURE — 83880 ASSAY OF NATRIURETIC PEPTIDE: CPT | Performed by: EMERGENCY MEDICINE

## 2021-01-10 PROCEDURE — 84145 PROCALCITONIN (PCT): CPT | Performed by: EMERGENCY MEDICINE

## 2021-01-10 RX ORDER — BENZONATATE 100 MG/1
100 CAPSULE ORAL ONCE
Status: COMPLETED | OUTPATIENT
Start: 2021-01-10 | End: 2021-01-10

## 2021-01-10 RX ORDER — BENZONATATE 100 MG/1
100 CAPSULE ORAL EVERY 8 HOURS
Qty: 21 CAPSULE | Refills: 0 | Status: SHIPPED | OUTPATIENT
Start: 2021-01-10

## 2021-01-10 RX ORDER — METHYLPREDNISOLONE 4 MG/1
TABLET ORAL
Qty: 21 TABLET | Refills: 0 | Status: SHIPPED | OUTPATIENT
Start: 2021-01-10 | End: 2021-01-26

## 2021-01-10 RX ORDER — ALBUTEROL SULFATE 90 UG/1
2 AEROSOL, METERED RESPIRATORY (INHALATION) ONCE
Status: COMPLETED | OUTPATIENT
Start: 2021-01-10 | End: 2021-01-10

## 2021-01-10 RX ORDER — POTASSIUM CHLORIDE 20 MEQ/1
40 TABLET, EXTENDED RELEASE ORAL ONCE
Status: COMPLETED | OUTPATIENT
Start: 2021-01-10 | End: 2021-01-10

## 2021-01-10 RX ORDER — DEXAMETHASONE SODIUM PHOSPHATE 4 MG/ML
8 INJECTION, SOLUTION INTRA-ARTICULAR; INTRALESIONAL; INTRAMUSCULAR; INTRAVENOUS; SOFT TISSUE ONCE
Status: COMPLETED | OUTPATIENT
Start: 2021-01-10 | End: 2021-01-10

## 2021-01-10 RX ORDER — POTASSIUM CHLORIDE 20 MEQ/1
20 TABLET, EXTENDED RELEASE ORAL 2 TIMES DAILY
Qty: 20 TABLET | Refills: 0 | Status: SHIPPED | OUTPATIENT
Start: 2021-01-10

## 2021-01-10 RX ADMIN — SODIUM CHLORIDE 1000 ML: 0.9 INJECTION, SOLUTION INTRAVENOUS at 21:42

## 2021-01-10 RX ADMIN — IOHEXOL 85 ML: 350 INJECTION, SOLUTION INTRAVENOUS at 21:53

## 2021-01-10 RX ADMIN — ALBUTEROL SULFATE 2 PUFF: 90 AEROSOL, METERED RESPIRATORY (INHALATION) at 22:07

## 2021-01-10 RX ADMIN — BENZONATATE 100 MG: 100 CAPSULE ORAL at 21:33

## 2021-01-10 RX ADMIN — DEXAMETHASONE SODIUM PHOSPHATE 8 MG: 4 INJECTION, SOLUTION INTRAMUSCULAR; INTRAVENOUS at 22:07

## 2021-01-10 RX ADMIN — POTASSIUM CHLORIDE 40 MEQ: 1500 TABLET, EXTENDED RELEASE ORAL at 21:33

## 2021-01-10 NOTE — ED PROVIDER NOTES
History  Chief Complaint   Patient presents with    Shortness of Breath     pateint was tested positive for covid last wednesday and this past wednesday she started getting short of breath  47-year-old female for the past medical history of Hashimoto thyroiditis, pulmonary embolism on aspirin 81 mg daily, pituitary mass presents with worsening shortness of breath with productive cough and yellow sputum x2 days  Patient states she has been having COVID symptoms the past 6 days and had a test done at urgent care in [place] on January 3, 2021  She found out yesterday, January 9th that she is COVID positive  She states that she got infected from her sister  Patient was placed on doxycycline, prednisone and albuterol inhaler five days ago after chest x-ray shows that she has pneumonia  Patient states she feels worse since being on medications  Patient denies fever, chills, headache, neck stiffness, sore throat, nausea, vomiting, chest pain, back pain, rash, abdominal pain, urinary symptoms, vaginal bleeding or discharge, focal motor or sensory deficits, lower extremity swelling or pain, diarrhea, bloody stools or constipation  Dr Denise Celaya wore PPE during clinical evaluation due to COVID-19 pandemic including bonnet, eye goggles, face mask, gown and gloves  History provided by:  Patient   used: No    Shortness of Breath  Severity:  Moderate  Onset quality:  Gradual  Duration:  6 days  Timing:  Constant  Progression:  Worsening  Chronicity:  New  Context: activity    Context: not animal exposure, not emotional upset, not fumes, not known allergens, not occupational exposure, not pollens, not smoke exposure, not strong odors, not URI and not weather changes    Relieved by:  Rest  Worsened by:   Activity, coughing and exertion  Ineffective treatments:  Inhaler  Associated symptoms: cough and sputum production    Associated symptoms: no abdominal pain, no chest pain, no claudication, no diaphoresis, no ear pain, no fever, no headaches, no hemoptysis, no neck pain, no PND, no rash, no sore throat, no syncope, no swollen glands, no vomiting and no wheezing    Cough:     Cough characteristics:  Productive    Sputum characteristics:  Yellow    Severity:  Moderate    Onset quality:  Unable to specify    Duration:  6 days    Timing:  Intermittent    Progression:  Unchanged    Chronicity:  New  Risk factors: obesity    Risk factors: no recent alcohol use, no family hx of DVT, no hx of cancer, no hx of PE/DVT, no oral contraceptive use, no prolonged immobilization, no recent surgery and no tobacco use        Prior to Admission Medications   Prescriptions Last Dose Informant Patient Reported? Taking?   cabergoline (DOSTINEX) 0 5 MG tablet  Self Yes No   Sig: Take 0 25 mg by mouth 2 (two) times a week   celecoxib (CeleBREX) 200 mg capsule   No No   Sig: Take 1 capsule (200 mg total) by mouth daily for 14 days   levothyroxine 25 mcg tablet  Self Yes No   Sig: Take 25 mcg by mouth daily   norethindrone (MICRONOR) 0 35 MG tablet  Self Yes No   Sig: Take 1 tablet by mouth daily   predniSONE 10 mg tablet   No No   Sig: Take once daily all days pills on this schedule 6- 6- 5- 4- 3- 2- 1      Facility-Administered Medications: None       Past Medical History:   Diagnosis Date    Disease of thyroid gland     Pituitary mass ([address] )     Pulmonary embolism (HCC)        Past Surgical History:   Procedure Laterality Date    NO PAST SURGERIES         Family History   Problem Relation Age of Onset    Diabetes Mother     No Known Problems Father      I have reviewed and agree with the history as documented      E-Cigarette/Vaping    E-Cigarette Use Never User      E-Cigarette/Vaping Substances     Social History     Tobacco Use    Smoking status: Never Smoker    Smokeless tobacco: Never Used   Substance Use Topics    Alcohol use: Never     Frequency: Never    Drug use: Never       Review of Systems   Constitutional: Negative for chills, diaphoresis, fatigue and fever  HENT: Negative for congestion, drooling, ear pain, facial swelling, nosebleeds, sneezing, sore throat, trouble swallowing and voice change  Eyes: Negative for photophobia, pain and visual disturbance  Respiratory: Positive for cough, sputum production and shortness of breath  Negative for hemoptysis, chest tightness, wheezing and stridor  Cardiovascular: Negative for chest pain, palpitations, claudication, leg swelling, syncope and PND  Gastrointestinal: Negative for abdominal pain, constipation, diarrhea, nausea and vomiting  Genitourinary: Negative for decreased urine volume, difficulty urinating, dysuria, flank pain, frequency, hematuria, urgency, vaginal bleeding and vaginal discharge  Musculoskeletal: Negative for arthralgias, back pain, myalgias, neck pain and neck stiffness  Skin: Negative for color change, pallor, rash and wound  Allergic/Immunologic: Negative for immunocompromised state  Neurological: Negative for dizziness, tremors, seizures, syncope, facial asymmetry, speech difficulty, weakness, light-headedness, numbness and headaches  Hematological: Negative for adenopathy  Psychiatric/Behavioral: Negative for agitation, confusion, hallucinations and suicidal ideas  The patient is not nervous/anxious  Physical Exam  Physical Exam  Vitals signs and nursing note reviewed  Exam conducted with a chaperone present  Constitutional:       General: She is not in acute distress  Appearance: Normal appearance  She is well-developed and normal weight  She is not ill-appearing, toxic-appearing or diaphoretic  HENT:      Head: Normocephalic and atraumatic  Jaw: There is normal jaw occlusion  Right Ear: Hearing, tympanic membrane, ear canal and external ear normal  There is no impacted cerumen  No mastoid tenderness  No hemotympanum        Left Ear: Hearing, tympanic membrane, ear canal and external ear normal  There is no impacted cerumen  No mastoid tenderness  No hemotympanum  Nose: Nose normal       Right Nostril: No epistaxis  Left Nostril: No epistaxis  Right Sinus: No maxillary sinus tenderness or frontal sinus tenderness  Left Sinus: No maxillary sinus tenderness or frontal sinus tenderness  Mouth/Throat:      Lips: Pink  No lesions  Mouth: Mucous membranes are moist  No lacerations or angioedema  Tongue: No lesions  Tongue does not deviate from midline  Palate: No mass and lesions  Pharynx: Oropharynx is clear  Uvula midline  No pharyngeal swelling, oropharyngeal exudate, posterior oropharyngeal erythema or uvula swelling  Tonsils: No tonsillar exudate or tonsillar abscesses  Eyes:      General: Lids are normal  Vision grossly intact  Gaze aligned appropriately  No visual field deficit or scleral icterus  Right eye: No discharge  Left eye: No discharge  Extraocular Movements: Extraocular movements intact  Right eye: No nystagmus  Left eye: No nystagmus  Conjunctiva/sclera: Conjunctivae normal       Right eye: Right conjunctiva is not injected  Left eye: Left conjunctiva is not injected  Pupils: Pupils are equal, round, and reactive to light  Neck:      Musculoskeletal: Full passive range of motion without pain, normal range of motion and neck supple  No neck rigidity, spinous process tenderness or muscular tenderness  Thyroid: No thyroid mass or thyromegaly  Trachea: Trachea and phonation normal    Cardiovascular:      Rate and Rhythm: Normal rate and regular rhythm  Pulses: Normal pulses  Radial pulses are 2+ on the right side and 2+ on the left side  Dorsalis pedis pulses are 2+ on the right side and 2+ on the left side        Heart sounds: Normal heart sounds, S1 normal and S2 normal    Pulmonary:      Effort: Pulmonary effort is normal  No tachypnea, accessory muscle usage, respiratory distress or retractions  Breath sounds: Normal air entry  No stridor or decreased air movement  Examination of the right-lower field reveals decreased breath sounds  Examination of the left-lower field reveals decreased breath sounds  Decreased breath sounds present  No wheezing, rhonchi or rales  Chest:      Chest wall: No tenderness  Abdominal:      General: Abdomen is flat  Bowel sounds are normal  There is no distension  Palpations: Abdomen is soft  Abdomen is not rigid  There is no mass  Tenderness: There is no abdominal tenderness  There is no right CVA tenderness, left CVA tenderness, guarding or rebound  Negative signs include Duran's sign and McBurney's sign  Hernia: No hernia is present  Musculoskeletal: Normal range of motion  General: No swelling, tenderness, deformity or signs of injury  Right lower leg: She exhibits no tenderness  No edema  Left lower leg: She exhibits no tenderness  No edema  Lymphadenopathy:      Cervical: No cervical adenopathy  Skin:     General: Skin is warm and dry  Capillary Refill: Capillary refill takes less than 2 seconds  Coloration: Skin is not ashen, cyanotic, jaundiced, mottled, pale or sallow  Findings: No abrasion, abscess, acne, bruising, burn, ecchymosis, erythema, signs of injury, laceration, lesion, petechiae, rash or wound  Rash is not macular or papular  Nails: There is no clubbing  Neurological:      General: No focal deficit present  Mental Status: She is alert and oriented to person, place, and time  Mental status is at baseline  She is not disoriented  GCS: GCS eye subscore is 4  GCS verbal subscore is 5  GCS motor subscore is 6  Cranial Nerves: Cranial nerves are intact  No cranial nerve deficit, dysarthria or facial asymmetry  Sensory: Sensation is intact  No sensory deficit  Motor: Motor function is intact   No weakness, tremor, atrophy, abnormal muscle tone or seizure activity  Coordination: Coordination is intact  Coordination normal       Gait: Gait is intact  Gait normal       Comments: Patient is AAOx4, GCS 15; speaking clearly and appropriately; motor and sensation intact; visual fields intact; cranial nerves II-XII grossly intact; no facial droop, slurred speech or arm drift   Psychiatric:         Attention and Perception: Attention and perception normal  She is attentive  Mood and Affect: Mood and affect normal          Speech: Speech normal          Behavior: Behavior normal  Behavior is cooperative  Thought Content:  Thought content normal          Cognition and Memory: Cognition and memory normal          Judgment: Judgment normal          Vital Signs  ED Triage Vitals   Temperature Pulse Respirations Blood Pressure SpO2   01/10/21 1819 01/10/21 1815 01/10/21 1815 01/10/21 1815 01/10/21 1815   98 8 °F (37 1 °C) (!) 107 18 134/66 94 %      Temp Source Heart Rate Source Patient Position - Orthostatic VS BP Location FiO2 (%)   01/10/21 1819 01/10/21 1815 01/10/21 1819 01/10/21 1819 --   Temporal Monitor Sitting Right arm       Pain Score       --                  Vitals:    01/10/21 1815 01/10/21 1819 01/10/21 2141   BP: 134/66 134/66 143/78   Pulse: (!) 107 96 95   Patient Position - Orthostatic VS:  Sitting Sitting         Visual Acuity      ED Medications  Medications   sodium chloride 0 9 % bolus 1,000 mL (1,000 mL Intravenous New Bag 1/10/21 2142)   potassium chloride (K-DUR,KLOR-CON) CR tablet 40 mEq (40 mEq Oral Given 1/10/21 2133)   dexamethasone (DECADRON) injection 8 mg (8 mg Intravenous Given 1/10/21 2207)   benzonatate (TESSALON PERLES) capsule 100 mg (100 mg Oral Given 1/10/21 2133)   albuterol (PROVENTIL HFA,VENTOLIN HFA) inhaler 2 puff (2 puffs Inhalation Given 1/10/21 2207)   iohexol (OMNIPAQUE) 350 MG/ML injection (SINGLE-DOSE) 85 mL (85 mL Intravenous Given 1/10/21 2153)       Diagnostic Studies  Results Reviewed Procedure Component Value Units Date/Time    Lactic acid 2 Hours [[id number]]  (Normal) Collected: 01/10/21 2140    Lab Status: Final result Specimen: Blood from Arm, Left Updated: 01/10/21 2204     LACTIC ACID 1 7 mmol/L     Narrative:      Result may be elevated if tourniquet was used during collection  Triglycerides [[id number]]  (Abnormal) Collected: 01/10/21 1908    Lab Status: Final result Specimen: Blood from Arm, Left Updated: 01/10/21 2005     Triglycerides 151 mg/dL     Lactic acid, plasma [[id number]]  (Abnormal) Collected: 01/10/21 1908    Lab Status: Final result Specimen: Blood from Arm, Left Updated: 01/10/21 2001     LACTIC ACID 2 1 mmol/L     Narrative:      Result may be elevated if tourniquet was used during collection      Comprehensive metabolic panel [[id number]]  (Abnormal) Collected: 01/10/21 1908    Lab Status: Final result Specimen: Blood from Arm, Left Updated: 01/10/21 1945     Sodium 134 mmol/L      Potassium 3 2 mmol/L      Chloride 104 mmol/L      CO2 21 mmol/L      ANION GAP 9 mmol/L      BUN 20 mg/dL      Creatinine 1 03 mg/dL      Glucose 289 mg/dL      Calcium 8 4 mg/dL      Corrected Calcium 9 4 mg/dL      AST 18 U/L      ALT 31 U/L      Alkaline Phosphatase 55 U/L      Total Protein 7 3 g/dL      Albumin 2 7 g/dL      Total Bilirubin 0 40 mg/dL      eGFR 65 ml/min/1 73sq m     Narrative:      [place] guidelines for Chronic Kidney Disease (CKD):     Stage 1 with normal or high GFR (GFR > 90 mL/min/1 73 square meters)    Stage 2 Mild CKD (GFR = 60-89 mL/min/1 73 square meters)    Stage 3A Moderate CKD (GFR = 45-59 mL/min/1 73 square meters)    Stage 3B Moderate CKD (GFR = 30-44 mL/min/1 73 square meters)    Stage 4 Severe CKD (GFR = 15-29 mL/min/1 73 square meters)    Stage 5 End Stage CKD (GFR <15 mL/min/1 73 square meters)  Note: GFR calculation is accurate only with a steady state creatinine    Magnesium [[id number]]  (Normal) Collected: 01/10/21 1908    Lab Status: Final result Specimen: Blood from Arm, Left Updated: 01/10/21 1945     Magnesium 2 2 mg/dL     C-reactive protein [[id number]]  (Abnormal) Collected: 01/10/21 1908    Lab Status: Final result Specimen: Blood from Arm, Left Updated: 01/10/21 1945     CRP 33 4 mg/L     NT-BNP PRO [[id number]]  (Abnormal) Collected: 01/10/21 1908    Lab Status: Final result Specimen: Blood from Arm, Left Updated: 01/10/21 1945     NT-proBNP 181 pg/mL     CK (with reflex to MB) [[id number]]  (Normal) Collected: 01/10/21 1908    Lab Status: Final result Specimen: Blood from Arm, Left Updated: 01/10/21 1942     Total CK 56 U/L     Troponin I [[id number]]  (Normal) Collected: 01/10/21 1908    Lab Status: Final result Specimen: Blood from Arm, Left Updated: 01/10/21 1938     Troponin I <0 02 ng/mL     CBC and differential [[id number]]  (Abnormal) Collected: 01/10/21 1908    Lab Status: Final result Specimen: Blood from Arm, Left Updated: 01/10/21 1937     WBC 7 62 Thousand/uL      RBC 5 14 Million/uL      Hemoglobin 14 9 g/dL      Hematocrit 44 0 %      MCV 86 fL      MCH 29 0 pg      MCHC 33 9 g/dL      RDW 13 0 %      MPV 9 8 fL      Platelets 746 Thousands/uL      nRBC 0 /100 WBCs     Narrative: This is an appended report  These results have been appended to a previously verified report      Manual Differential(PHLEBS Do Not Order) [[id number]]  (Abnormal) Collected: 01/10/21 1908    Lab Status: Final result Specimen: Blood from Arm, Left Updated: 01/10/21 1937     Segmented % 59 %      Bands % 1 %      Lymphocytes % 27 %      Monocytes % 11 %      Eosinophils, % 0 %      Basophils % 0 %      Metamyelocytes% 2 %      Absolute Neutrophils 4 57 Thousand/uL      Lymphocytes Absolute 2 06 Thousand/uL      Monocytes Absolute 0 84 Thousand/uL      Eosinophils Absolute 0 00 Thousand/uL      Basophils Absolute 0 00 Thousand/uL      Total Counted 100     RBC Morphology Normal     Platelet Estimate Adequate     Clumped Platelets Present    D-dimer, quantitative [[id number]]  (Abnormal) Collected: 01/10/21 1908    Lab Status: Final result Specimen: Blood from Arm, Left Updated: 01/10/21 1935     D-Dimer, Quant 2 08 ug/ml FEU     Protime-INR [[id number]]  (Normal) Collected: 01/10/21 1908    Lab Status: Final result Specimen: Blood from Arm, Left Updated: 01/10/21 1929     Protime 12 5 seconds      INR 0 95    Calcium, ionized [[id number]]  (Abnormal) Collected: 01/10/21 1908    Lab Status: Final result Specimen: Blood from Arm, Left Updated: 01/10/21 1918     Calcium, Ionized 1 11 mmol/L     Procalcitonin with AM Reflex [[id number]] Collected: 01/10/21 1908    Lab Status: In process Specimen: Blood from Arm, Left Updated: 01/10/21 1914    Ferritin [[id number]] Collected: 01/10/21 1908    Lab Status: In process Specimen: Blood from Arm, Left Updated: 01/10/21 1914    Blood culture #1 [[id number]] Collected: 01/10/21 1908    Lab Status: In process Specimen: Blood from Arm, Left Updated: 01/10/21 1914    Blood culture #2 [[id number]] Collected: 01/10/21 1908    Lab Status: In process Specimen: Blood from Arm, Right Updated: 01/10/21 1914                 CTA ED chest PE Study   Final Result by Karla Prescott MD (01/10 2232)      No evidence of pulmonary embolus through the visualized segmental and subsegmental branches  Multifocal areas of patchy groundglass opacification most pronounced in the left upper lobe and lingular region may be infectious/inflammatory etiology  No focal/lobar consolidation  3 cm calcified gallstone  Workstation performed: [id number]         XR chest 1 view portable   ED Interpretation by Kj Mcneil MD (01/10 2013)   Chest x-ray read and interpreted by me  Negative for pneumothorax, mediastinal widening, rib fracture, focal consolidation or pleural effusion  Procedures  ECG 12 Lead Documentation Only    Date/Time: 1/10/2021 7:28 PM  Performed by:  Kj Mcneil MD  Authorized by: Staci Stoddard MD     Indications / Diagnosis:  Sob  ECG reviewed by me, the ED Provider: yes    Patient location:  ED  Previous ECG:     Comparison to cardiac monitor: Yes    Interpretation:     Interpretation: abnormal    Rate:     ECG rate:  76bpm    ECG rate assessment: normal    Rhythm:     Rhythm: sinus rhythm    Ectopy:     Ectopy: none    QRS:     QRS axis:  Normal  Conduction:     Conduction: normal    ST segments:     ST segments:  Normal  T waves:     T waves: flattening and inverted      Flattening:  AVF, V3, V4, V5 and V6    Inverted:  AVR and III  Comments:      No STEMI  MA 146ms  QRS 96ms  QT 432ms             ED Course  ED Course as of Watson 10 2256   Sun Watson 10, 2021   2011 Will replete potassium, 3 2        2207 Patient was ambulated and was 96-97% on room air without tachypnea, tachycardia or shortness of breath  2243 CTA:IMPRESSION:     No evidence of pulmonary embolus through the visualized segmental and subsegmental branches      Multifocal areas of patchy groundglass opacification most pronounced in the left upper lobe and lingular region may be infectious/inflammatory etiology  No focal/lobar consolidation      3 cm calcified gallstone  2248 Reassessed patient  She is feeling improved after treatment  Reviewed labs and imaging  Plan for discharge with primary care provider follow-up for COVID-19 pneumonia and hypokalemia  Patient is already on doxycycline, albuterol inhaler and a prednisone burst   Will give script for Tessalon Perles, Medrol Dosepak and Antonia Stack  Advised patient to discuss Medrol Dosepak with her primary care provider before using  Work note provided  Family will  patient from ED  Strict return to ER precautions discussed with and acknowledged by patient                    MDM  Number of Diagnoses or Management Options     Amount and/or Complexity of Data Reviewed  Clinical lab tests: reviewed and ordered  Tests in the radiology section of CPT®: reviewed and ordered  Tests in the medicine section of CPT®: reviewed and ordered  Review and summarize past medical records: yes  Discuss the patient with other providers: yes  Independent visualization of images, tracings, or specimens: yes (CXR, EKG)    Risk of Complications, Morbidity, and/or Mortality  Presenting problems: moderate  Diagnostic procedures: moderate  Management options: moderate    Patient Progress  Patient progress: stable      Disposition  Final diagnoses:   Pneumonia due to COVID-19 virus   Hypokalemia   Elevated d-dimer   Elevated brain natriuretic peptide (BNP) level   Gallstone     Time reflects when diagnosis was documented in both MDM as applicable and the Disposition within this note     Time User Action Codes Description Comment    1/10/2021 10:43 PM Didier Nicks Add [U07 1,  J12 82] Pneumonia due to COVID-19 virus     1/10/2021 10:44 PM Didier Nicks Add [E87 6] Hypokalemia     1/10/2021 10:44 PM Didier Nicks Add [R79 89] Elevated d-dimer     1/10/2021 10:44 PM Didier Nicks Add [R79 89] Elevated brain natriuretic peptide (BNP) level     1/10/2021 10:56 PM Didier Nicks Add [K80 20] Gallstone       ED Disposition     ED Disposition Condition Date/Time Comment    Discharge Stable Sun Watson 10, 2021 10:43 PM Clista Ao discharge to home/self care  Follow-up Information     Follow up With Specialties Details Why Contact Info    Adrianne West MD Family Medicine Call in 1 day Please follow up with your primary care doctor regarding your COVID-19 pneumonia and low potassium   [address]  [phone]            Patient's Medications   Discharge Prescriptions    BENZONATATE (TESSALON PERLES) 100 MG CAPSULE    Take 1 capsule (100 mg total) by mouth every 8 (eight) hours       Start Date: 1/10/2021 End Date: --       Order Dose: 100 mg       Quantity: 21 capsule    Refills: 0 METHYLPREDNISOLONE 4 MG TABLET THERAPY PACK    Use as directed on package       Start Date: 1/10/2021 End Date: --       Order Dose: --       Quantity: 21 tablet    Refills: 0    POTASSIUM CHLORIDE (K-DUR,KLOR-CON) 20 MEQ TABLET    Take 1 tablet (20 mEq total) by mouth 2 (two) times a day       Start Date: 1/10/2021 End Date: --       Order Dose: 20 mEq       Quantity: 20 tablet    Refills: 0     No discharge procedures on file      PDMP Review       Value Time User    PDMP Reviewed  Yes 1/10/2021 10:52 PM Booker Teran MD          ED Provider  Electronically Signed by    MD Booker Greenberg MD  01/10/21 6613

## 2021-01-10 NOTE — Clinical Note
Carlene Charu was seen and treated in our emergency department on 1/10/2021  Diagnosis:     Jaskaran Ramos  may return to work on return date  She may return on this date: 01/17/2021         If you have any questions or concerns, please don't hesitate to call        Mia Nunez MD    ______________________________           _______________          _______________  Hospital Representative                              Date                                Time

## 2021-01-11 LAB
ATRIAL RATE: 76 BPM
FERRITIN SERPL-MCNC: 869 NG/ML (ref 8–388)
P AXIS: 31 DEGREES
PR INTERVAL: 146 MS
PROCALCITONIN SERPL-MCNC: <0.05 NG/ML
QRS AXIS: 55 DEGREES
QRSD INTERVAL: 96 MS
QT INTERVAL: 384 MS
QTC INTERVAL: 432 MS
T WAVE AXIS: 16 DEGREES
VENTRICULAR RATE: 76 BPM

## 2021-01-11 NOTE — ED NOTES
Patient transported to [address] [facility], RN  01/10/21 8392

## 2021-01-11 NOTE — DISCHARGE INSTRUCTIONS

## 2021-01-12 ENCOUNTER — TELEPHONE (OUTPATIENT)
Dept: EMERGENCY DEPT | Facility: HOSPITAL | Age: 46
End: 2021-01-12

## 2021-01-14 LAB
BACTERIA BLD CULT: ABNORMAL
GRAM STN SPEC: ABNORMAL

## 2021-01-15 ENCOUNTER — APPOINTMENT (EMERGENCY)
Dept: NON INVASIVE DIAGNOSTICS | Facility: HOSPITAL | Age: 46
End: 2021-01-15
Payer: COMMERCIAL

## 2021-01-15 ENCOUNTER — HOSPITAL ENCOUNTER (EMERGENCY)
Facility: HOSPITAL | Age: 46
Discharge: HOME | End: 2021-01-15
Attending: STUDENT IN AN ORGANIZED HEALTH CARE EDUCATION/TRAINING PROGRAM
Payer: COMMERCIAL

## 2021-01-15 VITALS
BODY MASS INDEX: 39.09 KG/M2 | WEIGHT: 249.56 LBS | DIASTOLIC BLOOD PRESSURE: 72 MMHG | HEART RATE: 80 BPM | TEMPERATURE: 98.1 F | RESPIRATION RATE: 18 BRPM | SYSTOLIC BLOOD PRESSURE: 133 MMHG | OXYGEN SATURATION: 96 %

## 2021-01-15 DIAGNOSIS — I80.8 SUPERFICIAL THROMBOPHLEBITIS OF LEFT UPPER EXTREMITY: Primary | ICD-10-CM

## 2021-01-15 DIAGNOSIS — M79.602 PAIN, ARM, LEFT: ICD-10-CM

## 2021-01-15 DIAGNOSIS — U07.1 COVID-19: ICD-10-CM

## 2021-01-15 LAB
ALBUMIN SERPL BCP-MCNC: 3 G/DL (ref 3.5–5)
ALP SERPL-CCNC: 61 U/L (ref 46–116)
ALT SERPL W P-5'-P-CCNC: 73 U/L (ref 12–78)
ANION GAP SERPL CALCULATED.3IONS-SCNC: 13 MMOL/L (ref 4–13)
AST SERPL W P-5'-P-CCNC: 31 U/L (ref 5–45)
BASOPHILS # BLD MANUAL: 0 THOUSAND/UL (ref 0–0.1)
BASOPHILS NFR MAR MANUAL: 0 % (ref 0–1)
BILIRUB SERPL-MCNC: 0.62 MG/DL (ref 0.2–1)
BLASTS NFR BLD MANUAL: 1 %
BUN SERPL-MCNC: 12 MG/DL (ref 5–25)
CALCIUM ALBUM COR SERPL-MCNC: 9.1 MG/DL (ref 8.3–10.1)
CALCIUM SERPL-MCNC: 8.3 MG/DL (ref 8.3–10.1)
CHLORIDE SERPL-SCNC: 100 MMOL/L (ref 100–108)
CO2 SERPL-SCNC: 21 MMOL/L (ref 21–32)
CREAT SERPL-MCNC: 1.08 MG/DL (ref 0.6–1.3)
CRP SERPL QL: 18.5 MG/L
EOSINOPHIL # BLD MANUAL: 0.09 THOUSAND/UL (ref 0–0.4)
EOSINOPHIL NFR BLD MANUAL: 1 % (ref 0–6)
ERYTHROCYTE [DISTWIDTH] IN BLOOD BY AUTOMATED COUNT: 13.1 % (ref 11.6–15.1)
GFR SERPL CREATININE-BSD FRML MDRD: 61 ML/MIN/1.73SQ M
GLUCOSE SERPL-MCNC: 277 MG/DL (ref 65–140)
HCT VFR BLD AUTO: 47 % (ref 34.8–46.1)
HGB BLD-MCNC: 15.9 G/DL (ref 11.5–15.4)
LACTATE SERPL-SCNC: 1.6 MMOL/L (ref 0.5–2)
LACTATE SERPL-SCNC: 2.9 MMOL/L (ref 0.5–2)
LG PLATELETS BLD QL SMEAR: PRESENT
LYMPHOCYTES # BLD AUTO: 1.39 THOUSAND/UL (ref 0.6–4.47)
LYMPHOCYTES # BLD AUTO: 15 % (ref 14–44)
MCH RBC QN AUTO: 29.2 PG (ref 26.8–34.3)
MCHC RBC AUTO-ENTMCNC: 33.8 G/DL (ref 31.4–37.4)
MCV RBC AUTO: 86 FL (ref 82–98)
MONOCYTES # BLD AUTO: 0.37 THOUSAND/UL (ref 0–1.22)
MONOCYTES NFR BLD: 4 % (ref 4–12)
NEUTROPHILS # BLD MANUAL: 6.95 THOUSAND/UL (ref 1.85–7.62)
NEUTS BAND NFR BLD MANUAL: 1 % (ref 0–8)
NEUTS SEG NFR BLD AUTO: 74 % (ref 43–75)
NRBC BLD AUTO-RTO: 0 /100 WBCS
PLATELET # BLD AUTO: 259 THOUSANDS/UL (ref 149–390)
PLATELET BLD QL SMEAR: ADEQUATE
PMV BLD AUTO: 9.9 FL (ref 8.9–12.7)
POTASSIUM SERPL-SCNC: 4.2 MMOL/L (ref 3.5–5.3)
PROT SERPL-MCNC: 7.3 G/DL (ref 6.4–8.2)
RBC # BLD AUTO: 5.44 MILLION/UL (ref 3.81–5.12)
RBC MORPH BLD: NORMAL
SODIUM SERPL-SCNC: 134 MMOL/L (ref 136–145)
TOTAL CELLS COUNTED SPEC: 100
VARIANT LYMPHS # BLD AUTO: 4 %
WBC # BLD AUTO: 9.26 THOUSAND/UL (ref 4.31–10.16)

## 2021-01-15 PROCEDURE — 83605 ASSAY OF LACTIC ACID: CPT | Performed by: PHYSICIAN ASSISTANT

## 2021-01-15 PROCEDURE — 99284 EMERGENCY DEPT VISIT MOD MDM: CPT

## 2021-01-15 PROCEDURE — 80053 COMPREHEN METABOLIC PANEL: CPT | Performed by: PHYSICIAN ASSISTANT

## 2021-01-15 PROCEDURE — 86140 C-REACTIVE PROTEIN: CPT | Performed by: PHYSICIAN ASSISTANT

## 2021-01-15 PROCEDURE — 99285 EMERGENCY DEPT VISIT HI MDM: CPT | Performed by: PHYSICIAN ASSISTANT

## 2021-01-15 PROCEDURE — 96360 HYDRATION IV INFUSION INIT: CPT

## 2021-01-15 PROCEDURE — 93971 EXTREMITY STUDY: CPT | Performed by: SURGERY

## 2021-01-15 PROCEDURE — 85027 COMPLETE CBC AUTOMATED: CPT | Performed by: PHYSICIAN ASSISTANT

## 2021-01-15 PROCEDURE — 36415 COLL VENOUS BLD VENIPUNCTURE: CPT | Performed by: PHYSICIAN ASSISTANT

## 2021-01-15 PROCEDURE — 93971 EXTREMITY STUDY: CPT

## 2021-01-15 PROCEDURE — 85007 BL SMEAR W/DIFF WBC COUNT: CPT | Performed by: PHYSICIAN ASSISTANT

## 2021-01-15 RX ORDER — ACETAMINOPHEN 325 MG/1
650 TABLET ORAL ONCE
Status: COMPLETED | OUTPATIENT
Start: 2021-01-15 | End: 2021-01-15

## 2021-01-15 RX ADMIN — SODIUM CHLORIDE 1000 ML: 0.9 INJECTION, SOLUTION INTRAVENOUS at 13:11

## 2021-01-15 RX ADMIN — ACETAMINOPHEN 650 MG: 325 TABLET ORAL at 12:03

## 2021-01-15 NOTE — ED ATTENDING ATTESTATION
1/15/2021  IPriyanka DO, saw and evaluated the patient  I have discussed the patient with the resident/non-physician practitioner and agree with the resident's/non-physician practitioner's findings, Plan of Care, and MDM as documented in the resident's/non-physician practitioner's note, except where noted  All available labs and Radiology studies were reviewed  I was present for key portions of any procedure(s) performed by the resident/non-physician practitioner and I was immediately available to provide assistance

## 2021-01-15 NOTE — DISCHARGE INSTRUCTIONS
Please take over-the-counter NSAIDs to help aid in your superficial thrombophlebitis  Please use warm compresses on the left arm  Please follow-up with your family doctor for re-evaluation of your left arm  If you have any new or worsening symptoms please return to the emergency department  Please continue to follow COVID guidelines and again return to the emergency department if you have any worsening of symptoms  Please take over-the-counter vitamin-D, vitamin-C, multivitamin

## 2021-01-15 NOTE — ED PROVIDER NOTES
History  Chief Complaint   Patient presents with    Leg Pain     pain started on lower back side of legs down to ankles  patient has pain in her left arm and area a erythemia stated it is painful        50year-old female presents the emergency department for evaluation of bilateral ankle pain, left arm pain  PMH: Hashimoto thyroiditis, pulmonary embolism on aspirin 81 mg daily, pituitary mass  PE suspected from traveling and being on birth control  Patient reports she started with COVID symptoms around 27th of December, she states she tested positive for COVID on January 9, 2021  Patient was seen in the emergency department on January 10, 2029  She reports since that time she has developed swelling and red streaking in the left upper extremity  She reports this area is painful  Patient states this is close to where her IV site was  Patient additionally reports pain in bilateral lower extremities around her ankles  Reports she just feels overall achy  She denies any leg swelling, leg redness  Reports pain feels significantly different than previous DVT  She denies any fall, injury, trauma  Patient reports today she took her last dose of doxycycline which was prescribed along with prednisone and albuterol inhaler after being diagnosed with COVID when being evaluated for COVID symptoms  Patient continues with cough reports this is nonproductive  Patient denies chest pain, palpitations, shortness of breath  Patient denies fever, chills, headache, neck stiffness, sore throat, nausea, vomiting, diarrhea, back pain, rash, abdominal pain  She denies dysuria, hematuria, urinary frequency    Of note patient had positive D-dimer and negative CT PE on 01/10/2021      History provided by:  Patient  Arm Pain  Location:  LUE  Severity:  Mild  Onset quality:  Gradual  Duration:  5 days  Progression:  Worsening  Chronicity:  New  Associated symptoms: cough and myalgias    Associated symptoms: no abdominal pain, no chest pain, no congestion, no diarrhea, no ear pain, no fatigue, no fever, no headaches, no loss of consciousness, no nausea, no rash, no rhinorrhea, no shortness of breath, no sore throat, no vomiting and no wheezing        Prior to Admission Medications   Prescriptions Last Dose Informant Patient Reported? Taking?   benzonatate (TESSALON PERLES) 100 mg capsule   No No   Sig: Take 1 capsule (100 mg total) by mouth every 8 (eight) hours   cabergoline (DOSTINEX) 0 5 MG tablet  Self Yes No   Sig: Take 0 25 mg by mouth 2 (two) times a week   celecoxib (CeleBREX) 200 mg capsule   No No   Sig: Take 1 capsule (200 mg total) by mouth daily for 14 days   levothyroxine 25 mcg tablet  Self Yes No   Sig: Take 25 mcg by mouth daily   methylPREDNISolone 4 MG tablet therapy pack   No No   Sig: Use as directed on package   norethindrone (MICRONOR) 0 35 MG tablet  Self Yes No   Sig: Take 1 tablet by mouth daily   potassium chloride (K-DUR,KLOR-CON) 20 mEq tablet   No No   Sig: Take 1 tablet (20 mEq total) by mouth 2 (two) times a day   predniSONE 10 mg tablet   No No   Sig: Take once daily all days pills on this schedule 6- 6- 5- 4- 3- 2- 1      Facility-Administered Medications: None       Past Medical History:   Diagnosis Date    Disease of thyroid gland     Pituitary mass ([facility] [address] )     Pulmonary embolism (HCC)        Past Surgical History:   Procedure Laterality Date    NO PAST SURGERIES         Family History   Problem Relation Age of Onset    Diabetes Mother     No Known Problems Father      I have reviewed and agree with the history as documented  E-Cigarette/Vaping    E-Cigarette Use Never User      E-Cigarette/Vaping Substances     Social History     Tobacco Use    Smoking status: Never Smoker    Smokeless tobacco: Never Used   Substance Use Topics    Alcohol use: Never     Frequency: Never    Drug use: Never       Review of Systems   Constitutional: Negative    Negative for appetite change, chills, diaphoresis, fatigue and fever  HENT: Negative  Negative for congestion, ear pain, rhinorrhea and sore throat  Eyes: Negative for visual disturbance  Respiratory: Positive for cough  Negative for choking, chest tightness, shortness of breath, wheezing and stridor  Cardiovascular: Negative  Negative for chest pain, palpitations and leg swelling  Gastrointestinal: Negative  Negative for abdominal pain, diarrhea, nausea and vomiting  Genitourinary: Negative  Musculoskeletal: Positive for myalgias  Negative for arthralgias, back pain, gait problem, joint swelling, neck pain and neck stiffness  Skin: Positive for color change (Erythema left upper extremity)  Negative for rash  Neurological: Negative  Negative for loss of consciousness and headaches  All other systems reviewed and are negative  Physical Exam  Physical Exam  Vitals signs and nursing note reviewed  Constitutional:       General: She is not in acute distress  Appearance: Normal appearance  She is normal weight  She is not ill-appearing, toxic-appearing or diaphoretic  HENT:      Head: Normocephalic and atraumatic  Nose: Nose normal  No congestion or rhinorrhea  Mouth/Throat:      Mouth: Mucous membranes are moist       Pharynx: Oropharynx is clear  No oropharyngeal exudate or posterior oropharyngeal erythema  Eyes:      Extraocular Movements: Extraocular movements intact  Conjunctiva/sclera: Conjunctivae normal       Pupils: Pupils are equal, round, and reactive to light  Neck:      Musculoskeletal: Normal range of motion and neck supple  No muscular tenderness  Cardiovascular:      Rate and Rhythm: Normal rate and regular rhythm  Pulses:           Radial pulses are 2+ on the right side and 2+ on the left side  Dorsalis pedis pulses are 2+ on the right side and 2+ on the left side  Posterior tibial pulses are 2+ on the right side and 2+ on the left side  Heart sounds: No murmur     Pulmonary: Effort: Pulmonary effort is normal  No respiratory distress  Breath sounds: Normal breath sounds  No stridor  No wheezing, rhonchi or rales  Chest:      Chest wall: No tenderness  Abdominal:      General: Abdomen is flat  Bowel sounds are normal  There is no distension  Palpations: Abdomen is soft  Tenderness: There is no abdominal tenderness  There is no right CVA tenderness, left CVA tenderness or guarding  Musculoskeletal: Normal range of motion  General: Swelling ( minimal swelling of the left upper extremity  No swelling in bilateral lower extremities ) and tenderness ( tenderness on the inner aspect of the left upper extremity  Mild tenderness over the medial malleolus bilateral lower extremities  No tenderness in the posterior aspect of bilateral lower legs  Compartments soft nontender) present  No deformity or signs of injury  Right lower leg: No edema  Left lower leg: No edema  Skin:     General: Skin is warm and dry  Capillary Refill: Capillary refill takes less than 2 seconds  Coloration: Skin is not jaundiced or pale  Findings: Erythema (Streaking erythema on the inner aspect of the left upper extremity) present  No bruising, lesion or rash  Neurological:      General: No focal deficit present  Mental Status: She is alert and oriented to person, place, and time  Sensory: No sensory deficit  Motor: No weakness  Coordination: Coordination normal       Deep Tendon Reflexes: Reflexes normal    Psychiatric:         Mood and Affect: Mood normal          Behavior: Behavior normal          Thought Content:  Thought content normal          Judgment: Judgment normal          Vital Signs  ED Triage Vitals [01/15/21 1116]   Temperature Pulse Respirations Blood Pressure SpO2   98 1 °F (36 7 °C) 102 18 138/84 97 %      Temp Source Heart Rate Source Patient Position - Orthostatic VS BP Location FiO2 (%)   Temporal Monitor Sitting Right arm --      Pain Score       6           Vitals:    01/15/21 1116 01/15/21 1448 01/15/21 1500   BP: 138/84 133/72 133/72   Pulse: 102 81 80   Patient Position - Orthostatic VS: Sitting           Visual Acuity      ED Medications  Medications   acetaminophen (TYLENOL) tablet 650 mg (650 mg Oral Given 1/15/21 1203)   sodium chloride 0 9 % bolus 1,000 mL (0 mL Intravenous Stopped 1/15/21 1414)       Diagnostic Studies  Results Reviewed     Procedure Component Value Units Date/Time    Lactic acid 2 Hours [[id number]]  (Normal) Collected: 01/15/21 1414    Lab Status: Final result Specimen: Blood from Line, Venous Updated: 01/15/21 1440     LACTIC ACID 1 6 mmol/L     Narrative:      Result may be elevated if tourniquet was used during collection  CBC and differential [[id number]]  (Abnormal) Collected: 01/15/21 1201    Lab Status: Final result Specimen: Blood from Arm, Right Updated: 01/15/21 1239     WBC 9 26 Thousand/uL      RBC 5 44 Million/uL      Hemoglobin 15 9 g/dL      Hematocrit 47 0 %      MCV 86 fL      MCH 29 2 pg      MCHC 33 8 g/dL      RDW 13 1 %      MPV 9 9 fL      Platelets 354 Thousands/uL      nRBC 0 /100 WBCs     Narrative: This is an appended report  These results have been appended to a previously verified report      Manual Differential(PHLEBS Do Not Order) [[id number]]  (Abnormal) Collected: 01/15/21 1201    Lab Status: Final result Specimen: Blood from Arm, Right Updated: 01/15/21 1239     Segmented % 74 %      Bands % 1 %      Lymphocytes % 15 %      Monocytes % 4 %      Eosinophils, % 1 %      Basophils % 0 %      Blasts % 1 %      Atypical Lymphocytes % 4 %      Absolute Neutrophils 6 95 Thousand/uL      Lymphocytes Absolute 1 39 Thousand/uL      Monocytes Absolute 0 37 Thousand/uL      Eosinophils Absolute 0 09 Thousand/uL      Basophils Absolute 0 00 Thousand/uL      Total Counted 100     RBC Morphology Normal     Platelet Estimate Adequate     Large Platelet Present    C-reactive protein [[id number]]  (Abnormal) Collected: 01/15/21 1201    Lab Status: Final result Specimen: Blood from Arm, Right Updated: 01/15/21 1238     CRP 18 5 mg/L     Lactic acid [[id number]]  (Abnormal) Collected: 01/15/21 1201    Lab Status: Final result Specimen: Blood from Arm, Right Updated: 01/15/21 1232     LACTIC ACID 2 9 mmol/L     Narrative:      Result may be elevated if tourniquet was used during collection      Comprehensive metabolic panel [[id number]]  (Abnormal) Collected: 01/15/21 1201    Lab Status: Final result Specimen: Blood from Arm, Right Updated: 01/15/21 1232     Sodium 134 mmol/L      Potassium 4 2 mmol/L      Chloride 100 mmol/L      CO2 21 mmol/L      ANION GAP 13 mmol/L      BUN 12 mg/dL      Creatinine 1 08 mg/dL      Glucose 277 mg/dL      Calcium 8 3 mg/dL      Corrected Calcium 9 1 mg/dL      AST 31 U/L      ALT 73 U/L      Alkaline Phosphatase 61 U/L      Total Protein 7 3 g/dL      Albumin 3 0 g/dL      Total Bilirubin 0 62 mg/dL      eGFR 61 ml/min/1 73sq m     Narrative:      [place] guidelines for Chronic Kidney Disease (CKD):     Stage 1 with normal or high GFR (GFR > 90 mL/min/1 73 square meters)    Stage 2 Mild CKD (GFR = 60-89 mL/min/1 73 square meters)    Stage 3A Moderate CKD (GFR = 45-59 mL/min/1 73 square meters)    Stage 3B Moderate CKD (GFR = 30-44 mL/min/1 73 square meters)    Stage 4 Severe CKD (GFR = 15-29 mL/min/1 73 square meters)    Stage 5 End Stage CKD (GFR <15 mL/min/1 73 square meters)  Note: GFR calculation is accurate only with a steady state creatinine                 VAS upper limb venous duplex scan, unilateral/limited   Final Result by Jessica Keane MD (01/15 1500)                 Procedures  Procedures         ED Course  ED Course as of Watson 15 1608   Fri Watson 15, 2021   1237 Elevated lactic acid   LACTIC ACID(!!): 2 9   1237 Hyponatremia   Sodium(!): 134   1237 Discussed with vascular technician who reported superficial thrombophlebitis  of Left upper extremity       1409 Patient resting comfortably at this time  No complaints  Will recheck lactic acid  1444 Repeat lactic acid 1 6       1445 I discussed results and findings with the patient  We discussed treatment of superficial thrombophlebitis including use of NSAIDs and warm compresses  Patient has follow-up with her family doctor on Tuesday and she will have this re-evaluated  We dysuria discussed importance of hydration  We discussed symptoms that require prompt return to the ED for further evaluation and patient verbalized understanding  She agreed with this treatment plan, remained well ED and was discharged home                                SBIRT 22yo+      Most Recent Value   SBIRT (22 yo +)   In order to provide better care to our patients, we are screening all of our patients for alcohol and drug use  Would it be okay to ask you these screening questions? Yes Filed at: 01/15/2021 1209   Initial Alcohol Screen: US AUDIT-C    1  How often do you have a drink containing alcohol?  0 Filed at: 01/15/2021 1209   2  How many drinks containing alcohol do you have on a typical day you are drinking? 0 Filed at: 01/15/2021 1209   3a  Male UNDER 65: How often do you have five or more drinks on one occasion? 0 Filed at: 01/15/2021 1209   3b  FEMALE Any Age, or MALE 65+: How often do you have 4 or more drinks on one occassion? 0 Filed at: 01/15/2021 1209   Audit-C Score  0 Filed at: 01/15/2021 1209   SHAYNA: How many times in the past year have you    Used an illegal drug or used a prescription medication for non-medical reasons?   Never Filed at: 01/15/2021 1209                    MDM  Number of Diagnoses or Management Options  COVID-19: new and requires workup  Superficial thrombophlebitis of left upper extremity: new and requires workup  Diagnosis management comments: 69-year-old COVID positive female presents to the emergency department for evaluation of red streaking, swelling and pain of the left upper extremity close in proximity to IV site 5 days ago  Patient reports distally reports lower extremity pain  Vitals and medical record reviewed  Tenderness, erythema, swelling on the inner aspect of the left upper extremity  Mild tenderness over the medial malleolus bilateral lower extremities  No tenderness in the posterior aspect of bilateral lower legs  Compartments soft nontender  Laboratory findings were significant for mild hyponatremia and elevated lactic acid  Patient had IV fluid hydration with improvement of lactic acid  Vascular study of left upper extremity concerning for superficial thrombophlebitis  Patient was educated on treatment of this including NSAIDs and warm compresses  She has follow-up with her family doctor on Tuesday and have this re-evaluated  Patient agreed with treatment plan, she remained well emergency department, she was educated on symptoms that require prompt return to the ED for further evaluation verbalized understanding  She agreed with this treatment plan and was discharged         Amount and/or Complexity of Data Reviewed  Clinical lab tests: ordered and reviewed  Tests in the radiology section of CPT®: ordered and reviewed  Review and summarize past medical records: yes  Independent visualization of images, tracings, or specimens: yes        Disposition  Final diagnoses:   Superficial thrombophlebitis of left upper extremity   COVID-19     Time reflects when diagnosis was documented in both MDM as applicable and the Disposition within this note     Time User Action Codes Description Comment    1/15/2021 12:46 PM Mary Gutiérrez Add [M79 [id number]] Pain, arm, left     1/15/2021  2:45 PM Susannah Kendall [I80 8] Superficial thrombophlebitis of left upper extremity     1/15/2021  2:45 PM Susannah Kendall [U07 1] COVID-19       ED Disposition     ED Disposition Condition Date/Time Comment    Discharge Stable Fri Watson 15, 2021 2:45 PM Odilia Christy discharge to home/self care  Follow-up Information     Follow up With Specialties Details Why Contact Info    Sally Delacruz MD Family Medicine In 1 week For re-check up left upper extremity [address]  [phone]            Discharge Medication List as of 1/15/2021  2:47 PM      CONTINUE these medications which have NOT CHANGED    Details   benzonatate (TESSALON PERLES) 100 mg capsule Take 1 capsule (100 mg total) by mouth every 8 (eight) hours, Starting Sun 1/10/2021, Print      cabergoline (DOSTINEX) 0 5 MG tablet Take 0 25 mg by mouth 2 (two) times a week, Historical Med      celecoxib (CeleBREX) 200 mg capsule Take 1 capsule (200 mg total) by mouth daily for 14 days, Starting Sun 8/19/2018, Until Sun 9/2/2018, Normal      levothyroxine 25 mcg tablet Take 25 mcg by mouth daily, Historical Med      methylPREDNISolone 4 MG tablet therapy pack Use as directed on package, Print      norethindrone (MICRONOR) 0 35 MG tablet Take 1 tablet by mouth daily, Historical Med      potassium chloride (K-DUR,KLOR-CON) 20 mEq tablet Take 1 tablet (20 mEq total) by mouth 2 (two) times a day, Starting Sun 1/10/2021, Print      predniSONE 10 mg tablet Take once daily all days pills on this schedule 6- 6- 5- 4- 3- 2- 1, Normal           No discharge procedures on file      PDMP Review       Value Time User    PDMP Reviewed  Yes 1/10/2021 10:52 PM Staci Stoddard MD          ED Provider  Electronically Signed by           Ashutosh Diana PA-C  01/15/21 [phone]

## 2021-01-16 LAB — BACTERIA BLD CULT: NORMAL

## 2021-01-26 ENCOUNTER — HOSPITAL ENCOUNTER (EMERGENCY)
Facility: HOSPITAL | Age: 46
End: 2021-01-26
Attending: EMERGENCY MEDICINE | Admitting: EMERGENCY MEDICINE
Payer: COMMERCIAL

## 2021-01-26 ENCOUNTER — HOSPITAL ENCOUNTER (INPATIENT)
Facility: HOSPITAL | Age: 46
LOS: 2 days | Discharge: HOME | DRG: 176 | End: 2021-01-28
Attending: EMERGENCY MEDICINE | Admitting: ANESTHESIOLOGY
Payer: COMMERCIAL

## 2021-01-26 ENCOUNTER — APPOINTMENT (EMERGENCY)
Dept: RADIOLOGY | Facility: HOSPITAL | Age: 46
End: 2021-01-26
Payer: COMMERCIAL

## 2021-01-26 ENCOUNTER — APPOINTMENT (EMERGENCY)
Dept: CT IMAGING | Facility: HOSPITAL | Age: 46
End: 2021-01-26
Payer: COMMERCIAL

## 2021-01-26 VITALS
SYSTOLIC BLOOD PRESSURE: 148 MMHG | HEART RATE: 146 BPM | TEMPERATURE: 97.1 F | BODY MASS INDEX: 36.41 KG/M2 | OXYGEN SATURATION: 98 % | DIASTOLIC BLOOD PRESSURE: 75 MMHG | RESPIRATION RATE: 22 BRPM | WEIGHT: 232 LBS | HEIGHT: 67 IN

## 2021-01-26 DIAGNOSIS — R77.8 ELEVATED TROPONIN: ICD-10-CM

## 2021-01-26 DIAGNOSIS — I26.02 ACUTE SADDLE PULMONARY EMBOLISM WITH ACUTE COR PULMONALE (HCC): Primary | ICD-10-CM

## 2021-01-26 DIAGNOSIS — R79.89 ELEVATED D-DIMER: ICD-10-CM

## 2021-01-26 DIAGNOSIS — R06.02 SOB (SHORTNESS OF BREATH): ICD-10-CM

## 2021-01-26 LAB
ALBUMIN SERPL BCP-MCNC: 3.8 G/DL (ref 3.5–5)
ALP SERPL-CCNC: 78 U/L (ref 46–116)
ALT SERPL W P-5'-P-CCNC: 100 U/L (ref 12–78)
ANION GAP SERPL CALCULATED.3IONS-SCNC: 17 MMOL/L (ref 4–13)
APTT PPP: 25 SECONDS (ref 23–37)
AST SERPL W P-5'-P-CCNC: 59 U/L (ref 5–45)
BASOPHILS # BLD AUTO: 0.06 THOUSANDS/ΜL (ref 0–0.1)
BASOPHILS NFR BLD AUTO: 1 % (ref 0–1)
BILIRUB SERPL-MCNC: 0.61 MG/DL (ref 0.2–1)
BUN SERPL-MCNC: 16 MG/DL (ref 5–25)
CALCIUM SERPL-MCNC: 9.2 MG/DL (ref 8.3–10.1)
CHLORIDE SERPL-SCNC: 99 MMOL/L (ref 100–108)
CK SERPL-CCNC: 49 U/L (ref 26–192)
CO2 SERPL-SCNC: 20 MMOL/L (ref 21–32)
CREAT SERPL-MCNC: 1 MG/DL (ref 0.6–1.3)
CRP SERPL QL: 24.5 MG/L
D DIMER PPP FEU-MCNC: >20 UG/ML FEU
EOSINOPHIL # BLD AUTO: 0.14 THOUSAND/ΜL (ref 0–0.61)
EOSINOPHIL NFR BLD AUTO: 1 % (ref 0–6)
ERYTHROCYTE [DISTWIDTH] IN BLOOD BY AUTOMATED COUNT: 13.2 % (ref 11.6–15.1)
FERRITIN SERPL-MCNC: 540 NG/ML (ref 8–388)
GFR SERPL CREATININE-BSD FRML MDRD: 67 ML/MIN/1.73SQ M
GLUCOSE SERPL-MCNC: 308 MG/DL (ref 65–140)
HCT VFR BLD AUTO: 48.7 % (ref 34.8–46.1)
HGB BLD-MCNC: 16.5 G/DL (ref 11.5–15.4)
IMM GRANULOCYTES # BLD AUTO: 0.08 THOUSAND/UL (ref 0–0.2)
IMM GRANULOCYTES NFR BLD AUTO: 1 % (ref 0–2)
INR PPP: 1.06 (ref 0.84–1.19)
LIPASE SERPL-CCNC: 153 U/L (ref 73–393)
LYMPHOCYTES # BLD AUTO: 1.4 THOUSANDS/ΜL (ref 0.6–4.47)
LYMPHOCYTES NFR BLD AUTO: 13 % (ref 14–44)
MAGNESIUM SERPL-MCNC: 1.7 MG/DL (ref 1.6–2.6)
MCH RBC QN AUTO: 29.3 PG (ref 26.8–34.3)
MCHC RBC AUTO-ENTMCNC: 33.9 G/DL (ref 31.4–37.4)
MCV RBC AUTO: 87 FL (ref 82–98)
MONOCYTES # BLD AUTO: 0.59 THOUSAND/ΜL (ref 0.17–1.22)
MONOCYTES NFR BLD AUTO: 6 % (ref 4–12)
NEUTROPHILS # BLD AUTO: 8.55 THOUSANDS/ΜL (ref 1.85–7.62)
NEUTS SEG NFR BLD AUTO: 78 % (ref 43–75)
NRBC BLD AUTO-RTO: 0 /100 WBCS
NT-PROBNP SERPL-MCNC: 45 PG/ML
PLATELET # BLD AUTO: 214 THOUSANDS/UL (ref 149–390)
PMV BLD AUTO: 10.3 FL (ref 8.9–12.7)
POTASSIUM SERPL-SCNC: 3.9 MMOL/L (ref 3.5–5.3)
PROT SERPL-MCNC: 8 G/DL (ref 6.4–8.2)
PROTHROMBIN TIME: 13.6 SECONDS (ref 11.6–14.5)
RBC # BLD AUTO: 5.63 MILLION/UL (ref 3.81–5.12)
SODIUM SERPL-SCNC: 136 MMOL/L (ref 136–145)
TROPONIN I SERPL-MCNC: 0.1 NG/ML
TSH SERPL DL<=0.05 MIU/L-ACNC: 3.19 UIU/ML (ref 0.36–3.74)
WBC # BLD AUTO: 10.82 THOUSAND/UL (ref 4.31–10.16)

## 2021-01-26 PROCEDURE — 84443 ASSAY THYROID STIM HORMONE: CPT | Performed by: EMERGENCY MEDICINE

## 2021-01-26 PROCEDURE — 96361 HYDRATE IV INFUSION ADD-ON: CPT

## 2021-01-26 PROCEDURE — 93005 ELECTROCARDIOGRAM TRACING: CPT

## 2021-01-26 PROCEDURE — 36415 COLL VENOUS BLD VENIPUNCTURE: CPT | Performed by: EMERGENCY MEDICINE

## 2021-01-26 PROCEDURE — 83690 ASSAY OF LIPASE: CPT | Performed by: EMERGENCY MEDICINE

## 2021-01-26 PROCEDURE — 85025 COMPLETE CBC W/AUTO DIFF WBC: CPT | Performed by: EMERGENCY MEDICINE

## 2021-01-26 PROCEDURE — 85730 THROMBOPLASTIN TIME PARTIAL: CPT | Performed by: EMERGENCY MEDICINE

## 2021-01-26 PROCEDURE — 85610 PROTHROMBIN TIME: CPT | Performed by: EMERGENCY MEDICINE

## 2021-01-26 PROCEDURE — 96365 THER/PROPH/DIAG IV INF INIT: CPT

## 2021-01-26 PROCEDURE — 85379 FIBRIN DEGRADATION QUANT: CPT | Performed by: EMERGENCY MEDICINE

## 2021-01-26 PROCEDURE — 71275 CT ANGIOGRAPHY CHEST: CPT

## 2021-01-26 PROCEDURE — 80053 COMPREHEN METABOLIC PANEL: CPT | Performed by: EMERGENCY MEDICINE

## 2021-01-26 PROCEDURE — 82550 ASSAY OF CK (CPK): CPT | Performed by: EMERGENCY MEDICINE

## 2021-01-26 PROCEDURE — 99291 CRITICAL CARE FIRST HOUR: CPT | Performed by: EMERGENCY MEDICINE

## 2021-01-26 PROCEDURE — 83735 ASSAY OF MAGNESIUM: CPT | Performed by: EMERGENCY MEDICINE

## 2021-01-26 PROCEDURE — 96360 HYDRATION IV INFUSION INIT: CPT

## 2021-01-26 PROCEDURE — 71045 X-RAY EXAM CHEST 1 VIEW: CPT

## 2021-01-26 PROCEDURE — 86140 C-REACTIVE PROTEIN: CPT | Performed by: EMERGENCY MEDICINE

## 2021-01-26 PROCEDURE — 83880 ASSAY OF NATRIURETIC PEPTIDE: CPT | Performed by: EMERGENCY MEDICINE

## 2021-01-26 PROCEDURE — 84484 ASSAY OF TROPONIN QUANT: CPT | Performed by: EMERGENCY MEDICINE

## 2021-01-26 PROCEDURE — 99291 CRITICAL CARE FIRST HOUR: CPT

## 2021-01-26 PROCEDURE — 82728 ASSAY OF FERRITIN: CPT | Performed by: EMERGENCY MEDICINE

## 2021-01-26 PROCEDURE — G1004 CDSM NDSC: HCPCS

## 2021-01-26 RX ORDER — ACETAMINOPHEN 325 MG/1
975 TABLET ORAL EVERY 6 HOURS PRN
Status: DISCONTINUED | OUTPATIENT
Start: 2021-01-26 | End: 2021-01-28 | Stop reason: HOSPADM

## 2021-01-26 RX ORDER — HEPARIN SODIUM 1000 [USP'U]/ML
8400 INJECTION, SOLUTION INTRAVENOUS; SUBCUTANEOUS ONCE
Status: DISCONTINUED | OUTPATIENT
Start: 2021-01-26 | End: 2021-01-26 | Stop reason: HOSPADM

## 2021-01-26 RX ORDER — ALBUTEROL SULFATE 90 UG/1
2 AEROSOL, METERED RESPIRATORY (INHALATION) EVERY 6 HOURS PRN
COMMUNITY

## 2021-01-26 RX ORDER — SODIUM CHLORIDE 9 MG/ML
50 INJECTION, SOLUTION INTRAVENOUS ONCE
Status: COMPLETED | OUTPATIENT
Start: 2021-01-26 | End: 2021-01-26

## 2021-01-26 RX ORDER — HEPARIN SODIUM 10000 [USP'U]/100ML
3-30 INJECTION, SOLUTION INTRAVENOUS
Status: DISCONTINUED | OUTPATIENT
Start: 2021-01-26 | End: 2021-01-26 | Stop reason: HOSPADM

## 2021-01-26 RX ORDER — SODIUM CHLORIDE 9 MG/ML
3 INJECTION INTRAVENOUS
Status: DISCONTINUED | OUTPATIENT
Start: 2021-01-26 | End: 2021-01-26 | Stop reason: HOSPADM

## 2021-01-26 RX ORDER — CHLORHEXIDINE GLUCONATE 0.12 MG/ML
15 RINSE ORAL EVERY 12 HOURS SCHEDULED
Status: DISCONTINUED | OUTPATIENT
Start: 2021-01-26 | End: 2021-01-27

## 2021-01-26 RX ADMIN — SODIUM CHLORIDE 1000 ML: 0.9 INJECTION, SOLUTION INTRAVENOUS at 16:13

## 2021-01-26 RX ADMIN — ALTEPLASE 100 MG: KIT at 18:03

## 2021-01-26 RX ADMIN — CHLORHEXIDINE GLUCONATE 0.12% ORAL RINSE 15 ML: 1.2 LIQUID ORAL at 22:20

## 2021-01-26 RX ADMIN — IOHEXOL 100 ML: 350 INJECTION, SOLUTION INTRAVENOUS at 16:46

## 2021-01-26 RX ADMIN — SODIUM CHLORIDE 50 ML/HR: 0.9 INJECTION, SOLUTION INTRAVENOUS at 18:17

## 2021-01-26 NOTE — ED PROVIDER NOTES
History  Chief Complaint   Patient presents with    Shortness of Breath     Pt reports sob and feels that her heart is racing, pt reports that she has a pulse ox at home and her HR was between 120-130  Pt reports testing covid + 1/4/21     53-year-old female with a past medical history of Hashimoto's thyroiditis, pituitary microadenoma, pulmonary embolism, COVID-19 infection presents with shortness of breath and palpitations  Patient states she has had shortness of breath since early January when she was first diagnosed with COVID-19 on January 4, 2021  Patient states that she became acutely more short of breath today and started having heart palpitations around 2:00 p m  Alethea Martinez Patient states that she had a pulmonary embolism many years ago and she feels like she has one again  Patient presents to emergency department with pulse ox of 88% on room air and was placed on 2 5 L nasal cannula with pulse ox improving to 95%  Patient is not on blood thinners  Denies history of myocardial infarction or stents  Patient denies fever, chills, headache, neck stiffness, sore throat, cough, sputum production, nausea, vomiting, chest pain, back pain, rash, abdominal pain, urinary symptoms,  vaginal bleeding or discharge, focal motor or sensory deficits, lower extremity swelling or pain, diarrhea, bloody stools or constipation  Patient had been treated in January for pneumonia and has completed her antibiotics and steroids  Dr Monique Chavez wore PPE during clinical evaluation due to COVID-19 pandemic including bonnet, eye goggles, face mask, gown and gloves            History provided by:  Patient and medical records   used: No    Shortness of Breath  Severity:  Severe  Onset quality:  Sudden  Duration:  1 day  Timing:  Constant  Progression:  Worsening  Chronicity:  Recurrent  Context: activity    Context: not animal exposure, not emotional upset, not fumes, not known allergens, not occupational exposure, not pollens, not smoke exposure, not strong odors, not URI and not weather changes    Relieved by:  Nothing  Worsened by: Activity, deep breathing, exertion and movement  Ineffective treatments:  None tried  Associated symptoms: no abdominal pain, no chest pain, no claudication, no cough, no diaphoresis, no ear pain, no fever, no headaches, no hemoptysis, no neck pain, no PND, no rash, no sore throat, no sputum production, no syncope, no swollen glands, no vomiting and no wheezing    Risk factors: hx of PE/DVT and obesity    Risk factors: no recent alcohol use, no family hx of DVT, no hx of cancer, no oral contraceptive use, no prolonged immobilization, no recent surgery and no tobacco use    Palpitations  Palpitations quality:  Fast  Onset quality:  Sudden  Duration:  1 day  Timing:  Constant  Progression:  Unchanged  Chronicity:  New  Context: not anxiety, not appetite suppressants, not blood loss, not bronchodilators, not caffeine, not dehydration, not exercise, not hyperventilation, not illicit drugs, not nicotine and not stimulant use    Associated symptoms: shortness of breath    Associated symptoms: no back pain, no chest pain, no cough, no diaphoresis, no dizziness, no hemoptysis, no nausea, no numbness, no PND, no syncope, no vomiting and no weakness        Prior to Admission Medications   Prescriptions Last Dose Informant Patient Reported? Taking?    albuterol (PROVENTIL HFA,VENTOLIN HFA) 90 mcg/act inhaler 1/26/2021 at Unknown time  Yes Yes   Sig: Inhale 2 puffs every 6 (six) hours as needed for wheezing   benzonatate (TESSALON PERLES) 100 mg capsule Not Taking at Unknown time  No No   Sig: Take 1 capsule (100 mg total) by mouth every 8 (eight) hours   Patient not taking: Reported on 1/26/2021   cabergoline (DOSTINEX) 0 5 MG tablet  Self Yes No   Sig: Take 0 25 mg by mouth 2 (two) times a week   levothyroxine 25 mcg tablet 1/26/2021 at Unknown time Self Yes Yes   Sig: Take 25 mcg by mouth daily norethindrone (MICRONOR) 0 35 MG tablet 1/26/2021 at Unknown time Self Yes Yes   Sig: Take 1 tablet by mouth daily   potassium chloride (K-DUR,KLOR-CON) 20 mEq tablet Not Taking at Unknown time  No No   Sig: Take 1 tablet (20 mEq total) by mouth 2 (two) times a day   Patient not taking: Reported on 1/26/2021      Facility-Administered Medications: None       Past Medical History:   Diagnosis Date    Disease of thyroid gland     Pituitary mass ([facility] [address] )     Pulmonary embolism (HCC)        Past Surgical History:   Procedure Laterality Date    NO PAST SURGERIES         Family History   Problem Relation Age of Onset    Diabetes Mother     No Known Problems Father      I have reviewed and agree with the history as documented  E-Cigarette/Vaping    E-Cigarette Use Never User      E-Cigarette/Vaping Substances     Social History     Tobacco Use    Smoking status: Never Smoker    Smokeless tobacco: Never Used   Substance Use Topics    Alcohol use: Never     Frequency: Never    Drug use: Never       Review of Systems   Constitutional: Negative for chills, diaphoresis, fatigue and fever  HENT: Negative for congestion, drooling, ear pain, facial swelling, nosebleeds, sneezing, sore throat, trouble swallowing and voice change  Eyes: Negative for photophobia, pain and visual disturbance  Respiratory: Positive for shortness of breath  Negative for cough, hemoptysis, sputum production, chest tightness and wheezing  Cardiovascular: Positive for palpitations  Negative for chest pain, claudication, leg swelling, syncope and PND  Gastrointestinal: Negative for abdominal pain, constipation, diarrhea, nausea and vomiting  Genitourinary: Negative for decreased urine volume, difficulty urinating, dysuria, flank pain, frequency, hematuria, urgency, vaginal bleeding and vaginal discharge  Musculoskeletal: Negative for arthralgias, back pain, myalgias, neck pain and neck stiffness     Skin: Negative for color change, pallor, rash and wound  Allergic/Immunologic: Negative for immunocompromised state  Neurological: Negative for dizziness, tremors, seizures, syncope, facial asymmetry, speech difficulty, weakness, light-headedness, numbness and headaches  Hematological: Negative for adenopathy  Psychiatric/Behavioral: Negative for agitation, confusion, hallucinations and suicidal ideas  The patient is not nervous/anxious  Physical Exam  Physical Exam  Vitals signs and nursing note reviewed  Exam conducted with a chaperone present  Constitutional:       General: She is not in acute distress  Appearance: Normal appearance  She is well-developed and normal weight  She is not ill-appearing, toxic-appearing or diaphoretic  HENT:      Head: Normocephalic and atraumatic  Jaw: There is normal jaw occlusion  Right Ear: Hearing, tympanic membrane, ear canal and external ear normal  There is no impacted cerumen  No mastoid tenderness  No hemotympanum  Left Ear: Hearing, tympanic membrane, ear canal and external ear normal  There is no impacted cerumen  No mastoid tenderness  No hemotympanum  Nose: Nose normal       Right Nostril: No epistaxis  Left Nostril: No epistaxis  Right Sinus: No maxillary sinus tenderness or frontal sinus tenderness  Left Sinus: No maxillary sinus tenderness or frontal sinus tenderness  Mouth/Throat:      Lips: Pink  No lesions  Mouth: Mucous membranes are moist  No lacerations or angioedema  Tongue: No lesions  Tongue does not deviate from midline  Palate: No mass and lesions  Pharynx: Oropharynx is clear  Uvula midline  No pharyngeal swelling, oropharyngeal exudate, posterior oropharyngeal erythema or uvula swelling  Tonsils: No tonsillar exudate or tonsillar abscesses  Eyes:      General: Lids are normal  Vision grossly intact  Gaze aligned appropriately  No visual field deficit or scleral icterus          Right eye: No discharge  Left eye: No discharge  Extraocular Movements: Extraocular movements intact  Right eye: No nystagmus  Left eye: No nystagmus  Conjunctiva/sclera: Conjunctivae normal       Right eye: Right conjunctiva is not injected  Left eye: Left conjunctiva is not injected  Pupils: Pupils are equal, round, and reactive to light  Neck:      Musculoskeletal: Full passive range of motion without pain, normal range of motion and neck supple  No neck rigidity, spinous process tenderness or muscular tenderness  Thyroid: No thyroid mass or thyromegaly  Trachea: Trachea and phonation normal    Cardiovascular:      Rate and Rhythm: Regular rhythm  Tachycardia present  Pulses: Normal pulses  Radial pulses are 2+ on the right side and 2+ on the left side  Dorsalis pedis pulses are 2+ on the right side and 2+ on the left side  Heart sounds: Normal heart sounds, S1 normal and S2 normal    Pulmonary:      Effort: Pulmonary effort is normal  Tachypnea present  No accessory muscle usage, respiratory distress or retractions  Breath sounds: Normal air entry  No stridor or decreased air movement  Examination of the right-middle field reveals decreased breath sounds  Examination of the left-middle field reveals decreased breath sounds  Examination of the right-lower field reveals decreased breath sounds  Examination of the left-lower field reveals decreased breath sounds  Decreased breath sounds present  No wheezing, rhonchi or rales  Chest:      Chest wall: No tenderness  Abdominal:      General: Abdomen is flat  Bowel sounds are normal  There is no distension  Palpations: Abdomen is soft  Abdomen is not rigid  There is no hepatomegaly, splenomegaly or mass  Tenderness: There is no abdominal tenderness  There is no right CVA tenderness, left CVA tenderness, guarding or rebound  Negative signs include Duran's sign and McBurney's sign  Hernia: No hernia is present  Musculoskeletal: Normal range of motion  General: No swelling, tenderness, deformity or signs of injury  Right lower leg: She exhibits no tenderness  No edema  Left lower leg: She exhibits no tenderness  No edema  Lymphadenopathy:      Cervical: No cervical adenopathy  Skin:     General: Skin is warm and dry  Capillary Refill: Capillary refill takes less than 2 seconds  Coloration: Skin is not ashen, cyanotic, jaundiced, mottled, pale or sallow  Findings: No abrasion, abscess, acne, bruising, burn, ecchymosis, erythema, signs of injury, laceration, lesion, petechiae, rash or wound  Rash is not macular or papular  Nails: There is no clubbing  Neurological:      General: No focal deficit present  Mental Status: She is alert and oriented to person, place, and time  Mental status is at baseline  She is not disoriented  GCS: GCS eye subscore is 4  GCS verbal subscore is 5  GCS motor subscore is 6  Cranial Nerves: Cranial nerves are intact  No cranial nerve deficit, dysarthria or facial asymmetry  Sensory: Sensation is intact  No sensory deficit  Motor: Motor function is intact  No weakness, tremor, atrophy, abnormal muscle tone or seizure activity  Coordination: Coordination is intact  Coordination normal       Gait: Gait is intact  Gait normal       Comments: Patient is AAOx4, GCS 15; speaking clearly and appropriately; motor and sensation intact; visual fields intact; cranial nerves II-XII grossly intact; no facial droop, slurred speech or arm drift   Psychiatric:         Attention and Perception: Attention and perception normal  She is attentive  Mood and Affect: Mood and affect normal          Speech: Speech normal          Behavior: Behavior normal  Behavior is cooperative  Thought Content:  Thought content normal          Cognition and Memory: Cognition and memory normal          Judgment: Judgment normal          Vital Signs  ED Triage Vitals   Temperature Pulse Respirations Blood Pressure SpO2   01/26/21 1513 01/26/21 1645 01/26/21 1513 01/26/21 1513 01/26/21 1514   (!) 97 1 °F (36 2 °C) (!) 126 18 150/100 94 %      Temp Source Heart Rate Source Patient Position - Orthostatic VS BP Location FiO2 (%)   01/26/21 1513 01/26/21 1513 01/26/21 1513 01/26/21 1513 --   Temporal Monitor Lying Left arm       Pain Score       01/26/21 1458       No Pain           Vitals:    01/26/21 1745 01/26/21 1815 01/26/21 1830 01/26/21 1845   BP: 130/69 130/69 126/92 145/88   Pulse: (!) 152 (!) 144 (!) 143 (!) 136   Patient Position - Orthostatic VS:  Lying Lying Lying         Visual Acuity      ED Medications  Medications   sodium chloride (PF) 0 9 % injection 3 mL (has no administration in time range)   heparin (porcine) injection 8,400 Units (0 Units Intravenous Hold 1/26/21 1809)   heparin (porcine) 25,000 units in 0 45% NaCl 250 mL infusion (premix) (0 Units/kg/hr × 105 kg (Order-Specific) Intravenous Hold 1/26/21 1809)   ALTEPLASE (ACTIVASE) IV INFUSION (PE) infusion 100 mg (100 mg Intravenous New Bag 1/26/21 1803)     Followed by   sodium chloride 0 9 % infusion (50 mL/hr Intravenous New Bag 1/26/21 1817)   sodium chloride 0 9 % bolus 1,000 mL (0 mL Intravenous Stopped 1/26/21 1813)   iohexol (OMNIPAQUE) 350 MG/ML injection (SINGLE-DOSE) 100 mL (100 mL Intravenous Given 1/26/21 1646)       Diagnostic Studies  Results Reviewed     Procedure Component Value Units Date/Time    APTT [[id number]]  (Normal) Collected: 01/26/21 1735    Lab Status: Final result Specimen: Blood from Arm, Right Updated: 01/26/21 1757     PTT 25 seconds     D-dimer, quantitative [[id number]]  (Abnormal) Collected: 01/26/21 1541    Lab Status: Final result Specimen: Blood from Arm, Right Updated: 01/26/21 1624     D-Dimer, Quant >20 00 ug/ml FEU     TSH, 3rd generation [[id number]]  (Normal) Collected: 01/26/21 1541    Lab Status: Final result Specimen: Blood from Arm, Right Updated: 01/26/21 1619     TSH 3RD GENERATON 3 188 uIU/mL     Narrative:      Patients undergoing fluorescein dye angiography may retain small amounts of fluorescein in the body for 48-72 hours post procedure  Samples containing fluorescein can produce falsely depressed TSH values  If the patient had this procedure,a specimen should be resubmitted post fluorescein clearance        NT-BNP PRO [[id number]]  (Normal) Collected: 01/26/21 1541    Lab Status: Final result Specimen: Blood from Arm, Right Updated: 01/26/21 1613     NT-proBNP 45 pg/mL     Lipase [[id number]]  (Normal) Collected: 01/26/21 1541    Lab Status: Final result Specimen: Blood from Arm, Right Updated: 01/26/21 1613     Lipase 153 u/L     Magnesium [[id number]]  (Normal) Collected: 01/26/21 1541    Lab Status: Final result Specimen: Blood from Arm, Right Updated: 01/26/21 1613     Magnesium 1 7 mg/dL     CK (with reflex to MB) [[id number]]  (Normal) Collected: 01/26/21 1541    Lab Status: Final result Specimen: Blood from Arm, Right Updated: 01/26/21 1613     Total CK 49 U/L     C-reactive protein [[id number]]  (Abnormal) Collected: 01/26/21 1541    Lab Status: Final result Specimen: Blood from Arm, Right Updated: 01/26/21 1613     CRP 24 5 mg/L     Troponin I [[id number]]  (Abnormal) Collected: 01/26/21 1541    Lab Status: Final result Specimen: Blood from Arm, Right Updated: 01/26/21 1612     Troponin I 0 10 ng/mL     Comprehensive metabolic panel [[id number]]  (Abnormal) Collected: 01/26/21 1541    Lab Status: Final result Specimen: Blood from Arm, Right Updated: 01/26/21 1609     Sodium 136 mmol/L      Potassium 3 9 mmol/L      Chloride 99 mmol/L      CO2 20 mmol/L      ANION GAP 17 mmol/L      BUN 16 mg/dL      Creatinine 1 00 mg/dL      Glucose 308 mg/dL      Calcium 9 2 mg/dL      AST 59 U/L       U/L      Alkaline Phosphatase 78 U/L      Total Protein 8 0 g/dL      Albumin 3 8 g/dL      Total Bilirubin 0 61 mg/dL      eGFR 67 ml/min/1 73sq m     Narrative:      [place] guidelines for Chronic Kidney Disease (CKD):     Stage 1 with normal or high GFR (GFR > 90 mL/min/1 73 square meters)    Stage 2 Mild CKD (GFR = 60-89 mL/min/1 73 square meters)    Stage 3A Moderate CKD (GFR = 45-59 mL/min/1 73 square meters)    Stage 3B Moderate CKD (GFR = 30-44 mL/min/1 73 square meters)    Stage 4 Severe CKD (GFR = 15-29 mL/min/1 73 square meters)    Stage 5 End Stage CKD (GFR <15 mL/min/1 73 square meters)  Note: GFR calculation is accurate only with a steady state creatinine    Protime-INR [[id number]]  (Normal) Collected: 01/26/21 1541    Lab Status: Final result Specimen: Blood from Arm, Right Updated: 01/26/21 1601     Protime 13 6 seconds      INR 1 06    CBC and differential [[id number]]  (Abnormal) Collected: 01/26/21 1541    Lab Status: Final result Specimen: Blood from Arm, Right Updated: 01/26/21 1549     WBC 10 82 Thousand/uL      RBC 5 63 Million/uL      Hemoglobin 16 5 g/dL      Hematocrit 48 7 %      MCV 87 fL      MCH 29 3 pg      MCHC 33 9 g/dL      RDW 13 2 %      MPV 10 3 fL      Platelets 430 Thousands/uL      nRBC 0 /100 WBCs      Neutrophils Relative 78 %      Immat GRANS % 1 %      Lymphocytes Relative 13 %      Monocytes Relative 6 %      Eosinophils Relative 1 %      Basophils Relative 1 %      Neutrophils Absolute 8 55 Thousands/µL      Immature Grans Absolute 0 08 Thousand/uL      Lymphocytes Absolute 1 40 Thousands/µL      Monocytes Absolute 0 59 Thousand/µL      Eosinophils Absolute 0 14 Thousand/µL      Basophils Absolute 0 06 Thousands/µL     Ferritin [[id number]] Collected: 01/26/21 1541    Lab Status: In process Specimen: Blood from Arm, Right Updated: 01/26/21 1547                 CTA ED chest PE Study   Final Result by Perez Tirado MD (01/26 1657)      Massive pulmonary emboli including saddle embolus with elevated RV LV ratio confirming right heart strain  Interventional consultation should be considered  Critical results were discussed with Dr Max Dey at 4:50 PM                  Workstation performed: [id number]         X-ray chest 1 view portable   Final Result by Florian Marie MD (01/26 1642)      No acute cardiopulmonary disease  Workstation performed: [id number]         VAS lower limb venous duplex study, complete bilateral    (Results Pending)              Procedures  ECG 12 Lead Documentation Only    Date/Time: 1/26/2021 3:18 PM  Performed by: Chanell Sy MD  Authorized by: Chanell Sy MD     Indications / Diagnosis:  Sob  ECG reviewed by me, the ED Provider: yes    Patient location:  ED  Previous ECG:     Comparison to cardiac monitor: Yes    Interpretation:     Interpretation: abnormal    Rate:     ECG rate:  123bpm    ECG rate assessment: tachycardic    Rhythm:     Rhythm: sinus tachycardia    Ectopy:     Ectopy: none    QRS:     QRS axis:  Normal  Conduction:     Conduction: normal    ST segments:     ST segments:  Abnormal    Depression:  V6, V5, III, II and I  T waves:     T waves: inverted      Inverted:  AVR and V1  Comments:      No STEMI  MI 154ms  QRS 92ms  QT 458ms  CriticalCare Time  Performed by: Chanell Sy MD  Authorized by:  Chanell Sy MD     Critical care provider statement:     Critical care time (minutes):  60    Critical care was necessary to treat or prevent imminent or life-threatening deterioration of the following conditions:  Respiratory failure and circulatory failure    Critical care was time spent personally by me on the following activities:  Development of treatment plan with patient or surrogate, discussions with consultants, evaluation of patient's response to treatment, examination of patient, ordering and performing treatments and interventions, ordering and review of laboratory studies, ordering and review of radiographic studies, re-evaluation of patient's condition and review of old charts    I assumed direction of critical care for this patient from another provider in my specialty: no    Comments:      COVID+ female here with worsening SOB; tachycardic, hypoxic (88%) on room air, improved to 95% on 2 5L NC; found to have saddle embolus with right heart strain; tPA infusion plus heparin ordered; no helicopter available due to bad weather, arranged ambulance for transfer to [facility] to critical care             ED Course  ED Course as of Jan 26 1911 Tue Jan 26, 2021   1523 Pulse ox 88% on room air upon arrival, put on 2 5L NC now with pulse ox 95%; tachypnea, tachycardia      1635 CTA ordered  Patient updated on labs and plan for imaging  [address] for [facility] to [facility] for critical care assessment and possible IR intervention  Updated patient regarding labs, imaging and plan for transfer  1 CTA:IMPRESSION:     Massive pulmonary emboli including saddle embolus with elevated RV LV ratio confirming right heart strain  Interventional consultation should be considered      Critical results were discussed with Dr Daniel Dickerson at 4:50 PM         [phone] Texted with Dr Glen Quezada, [address] and will start heparin bolus and gtt while waiting for PACS to return call with [facility] Critical Care  [facility] 15       [phone] Spoke with Dr Naseem Adorno, [address] Attending at [facility]  [facility] is not available due to bad weather so patient will have to go by ambulance  Considering patient's clot burden, Dr Naseem Adorno recommends pushing tPA 100 mg infusion over an hour, plus heparin bolus and gtt  If heparin drip has already been started, place on hold during tPA infusion, then restart after tPA infusion  Nurse and patient updated  Discussed options with patient including heparin and tPA  Discussed bleeding risks including GI bleed and head bleed with patient  She is alert and oriented x4 and has capacity to make her own decisions    She acknowledges risks for bleeding and agrees to receiving tPA infusion  Verbal consent obtained  Written consent obtained  Dr Naseem Adorno is aware that patient tested positive for COVID-19 on January 4th, 2021 and was treated this month for COVID-19 pneumonia  Discussed tPA infusion with charge nurse and nurse, stressed holding heparin while infusion is going  1805 Ask nurse to have CT angio imaging burned on CD ROM for transport  EMTALA completed  1809 [facility]  Patient has been accepted by Dr Naseem Adorno, [address] at [facility]  She is going to medical ICU Bed 4  [facility] ground crew will be at [address] [place] [address] for pick-up at 1900  Nurse to call report at [phone]  tPA infusion has started  [facility] ambulance crew here to transport patient to [facility]  Patient is stable and tolerating tPA infusion well              Bo Cheng' Criteria for PE      Most Recent Value   Wells' Criteria for PE   Clinical signs and symptoms of DVT  0 Filed at: 01/26/2021 1625   PE is primary diagnosis or equally likely  3 Filed at: 01/26/2021 1625   HR >100  1 5 Filed at: 01/26/2021 1625   Immobilization at least 3 days or Surgery in the previous 4 weeks  0 Filed at: 01/26/2021 1625   Previous, objectively diagnosed PE or DVT  0 Filed at: 01/26/2021 1625   Hemoptysis  0 Filed at: 01/26/2021 1625   Malignancy with treatment within 6 months or palliative  0 Filed at: 01/26/2021 1625   Wells' Criteria Total  4 5 Filed at: 01/26/2021 1625                MDM  Number of Diagnoses or Management Options  Acute saddle pulmonary embolism with acute cor pulmonale ([facility] [address] ):   Elevated d-dimer:   Elevated troponin:   SOB (shortness of breath):      Amount and/or Complexity of Data Reviewed  Clinical lab tests: reviewed and ordered  Tests in the radiology section of CPT®: ordered and reviewed  Tests in the medicine section of CPT®: ordered and reviewed  Review and summarize past medical records: yes  Discuss the patient with other providers: yes (Hospitalist, Dr Marline Haskins, Dr Romelia Mckeon  Cardiology, Dr Nitza Browne, Critical Care, Dr Natalie Roblero)  Independent visualization of images, tracings, or specimens: yes (EKG, CTA, CXR)    Risk of Complications, Morbidity, and/or Mortality  Presenting problems: high  Diagnostic procedures: moderate  Management options: moderate    Patient Progress  Patient progress: stable      Disposition  Final diagnoses:   Acute saddle pulmonary embolism with acute cor pulmonale (HCC)   SOB (shortness of breath)   Elevated d-dimer   Elevated troponin     Time reflects when diagnosis was documented in both MDM as applicable and the Disposition within this note     Time User Action Codes Description Comment    1/26/2021  6:31 PM Mihir Foyer Add [I26 02] Acute saddle pulmonary embolism with acute cor pulmonale ([address] )     1/26/2021  6:31 PM Mihir Foyer Add [R06 02] SOB (shortness of breath)     1/26/2021  6:31 PM Mihir Foyer Add [R79 89] Elevated d-dimer     1/26/2021  6:41 PM Mihir Foyer Add [R77 8] Elevated troponin       ED Disposition     ED Disposition Condition Date/Time Comment    Transfer to Another Facility-In Network  Tue Jan 26, 2021  4:57 PM Pardeep Naik should be transferred out to [facility]        MD Documentation      Most Recent Value   Patient Condition  The patient has been stabilized such that within reasonable medical probability, no material deterioration of the patient condition or the condition of the unborn child(angelo) is likely to result from the transfer   Reason for Transfer  Level of Care needed not available at this facility, Patient/Family request, No bed available at level of patient's needs   Benefits of Transfer  Specialized equipment and/or services available at the receiving facility (Include comment)________________________, Continuity of care, Patient preference   Risks of Transfer  Potential deterioration of medical condition, Potential for delay in receiving treatment, Loss of IV, Increased discomfort during transfer, Possible worsening of condition or death during transfer   Accepting Physician  Dr Natalie Roblero ([phone])   [address] Name, [address] [facility]   Sending MD Ashanti Purcell MD   Provider Certification  General risk, such as traffic hazards, adverse weather conditions, rough terrain or turbulence, possible failure of equipment (including vehicle or aircraft), or consequences of actions of persons outside the control of the transport personnel, Unanticipated needs of medical equipment and personnel during transport, Risk of worsening condition, The possibility of a transport vehicle being unavailable      RN Documentation      Most [facility] Name, [address] [facility]   Transport Mode  Ambulance   Level of Care  Advanced life support      Follow-up Information    None         Patient's Medications   Discharge Prescriptions    No medications on file     No discharge procedures on file      PDMP Review       Value Time User    PDMP Reviewed  Yes 1/10/2021 10:52 PM Carlyn Ramirez MD          ED Provider  Electronically Signed by    MD Carlyn Viveros MD  01/26/21 2166

## 2021-01-26 NOTE — EMTALA/ACUTE CARE TRANSFER
[facility]  [address]  [place] [address]  Dept: [phone]      EMTALA TRANSFER CONSENT    NAME Matt Granados                                         1975                              MRN [id number]    I have been informed of my rights regarding examination, treatment, and transfer   by Dr Pedro Ledbetter MD    Benefits: Specialized equipment and/or services available at the receiving facility (Include comment)________________________, Continuity of care, Patient preference    Risks: Potential deterioration of medical condition, Potential for delay in receiving treatment, Loss of IV, Increased discomfort during transfer, Possible worsening of condition or death during transfer      Transfer Request   I acknowledge that my medical condition has been evaluated and explained to me by the emergency department physician or other qualified medical person and/or my attending physician who has recommended and offered to me further medical examination and treatment  I understand the Hospital's obligation with respect to the treatment and stabilization of my emergency medical condition  I nevertheless request to be transferred  I release the Hospital, the doctor, and any other persons caring for me from all responsibility or liability for any injury or ill effects that may result from my transfer and agree to accept all responsibility for the consequences of my choice to transfer, rather than receive stabilizing treatment at the Hospital  I understand that because the transfer is my request, my insurance may not provide reimbursement for the services  The Hospital will assist and direct me and my family in how to make arrangements for transfer, but the hospital is not liable for any fees charged by the transport service    In spite of this understanding, I refuse to consent to further medical examination and treatment which has been offered to me, and request transfer to  [address], [address] : [address]  I authorize the performance of emergency medical procedures and treatments upon me in both transit and upon arrival at the receiving facility  Additionally, I authorize the release of any and all medical records to the receiving facility and request they be transported with me, if possible  I authorize the performance of emergency medical procedures and treatments upon me in both transit and upon arrival at the receiving facility  Additionally, I authorize the release of any and all medical records to the receiving facility and request they be transported with me, if possible  I understand that the safest mode of transportation during a medical emergency is an ambulance and that the Hospital advocates the use of this mode of transport  Risks of traveling to the receiving facility by car, including absence of medical control, life sustaining equipment, such as oxygen, and medical personnel has been explained to me and I fully understand them  (MARIELLE CORRECT BOX BELOW)  [  ]  I consent to the stated transfer and to be transported by ambulance/helicopter  [  ]  I consent to the stated transfer, but refuse transportation by ambulance and accept full responsibility for my transportation by car  I understand the risks of non-ambulance transfers and I exonerate the Hospital and its staff from any deterioration in my condition that results from this refusal     X___________________________________________    DATE  21  TIME________  Signature of patient or legally responsible individual signing on patient behalf           RELATIONSHIP TO PATIENT_________________________          Provider Certification    NAME Rob Lofton                                         1975                              MRN [id number]    A medical screening exam was performed on the above named patient    Based on the examination:    Condition Necessitating Transfer There were no encounter diagnoses  Patient Condition: The patient has been stabilized such that within reasonable medical probability, no material deterioration of the patient condition or the condition of the unborn child(angelo) is likely to result from the transfer    Reason for Transfer: Level of Care needed not available at this facility, Patient/Family request, No bed available at level of patient's needs    Transfer Requirements: [address]   · Space available and qualified personnel available for treatment as acknowledged by    · Agreed to accept transfer and to provide appropriate medical treatment as acknowledged by       Dr Lucila Vieyra ([phone])  · Appropriate medical records of the examination and treatment of the patient are provided at the time of transfer   [address] [facility], [address] _______  · Transfer will be performed by qualified personnel from    and appropriate transfer equipment as required, including the use of necessary and appropriate life support measures      Provider Certification: I have examined the patient and explained the following risks and benefits of being transferred/refusing transfer to the patient/family:  General risk, such as traffic hazards, adverse weather conditions, rough terrain or turbulence, possible failure of equipment (including vehicle or aircraft), or consequences of actions of persons outside the control of the transport personnel, Unanticipated needs of medical equipment and personnel during transport, Risk of worsening condition, The possibility of a transport vehicle being unavailable      Based on these reasonable risks and benefits to the patient and/or the unborn child(angelo), and based upon the information available at the time of the patients examination, I certify that the medical benefits reasonably to be expected from the provision of appropriate medical treatments at another medical facility outweigh the increasing risks, if any, to the individuals medical condition, and in the case of labor to the unborn child, from effecting the transfer      X____________________________________________ DATE 01/26/21        TIME_______      ORIGINAL - SEND TO MEDICAL RECORDS   COPY - SEND WITH PATIENT DURING TRANSFER

## 2021-01-27 ENCOUNTER — APPOINTMENT (INPATIENT)
Dept: NON INVASIVE DIAGNOSTICS | Facility: HOSPITAL | Age: 46
DRG: 176 | End: 2021-01-27
Payer: COMMERCIAL

## 2021-01-27 PROBLEM — U07.1 COVID-19: Status: ACTIVE | Noted: 2021-01-27

## 2021-01-27 PROBLEM — D35.2 PITUITARY MICROADENOMA (HCC): Status: ACTIVE | Noted: 2021-01-27

## 2021-01-27 PROBLEM — E06.3 HASHIMOTO'S THYROIDITIS: Status: ACTIVE | Noted: 2021-01-27

## 2021-01-27 PROBLEM — I26.92 ACUTE SADDLE PULMONARY EMBOLISM (HCC): Status: ACTIVE | Noted: 2021-01-27

## 2021-01-27 LAB
ALBUMIN SERPL BCP-MCNC: 3.1 G/DL (ref 3.5–5)
ALP SERPL-CCNC: 61 U/L (ref 46–116)
ALT SERPL W P-5'-P-CCNC: 70 U/L (ref 12–78)
ANION GAP SERPL CALCULATED.3IONS-SCNC: 7 MMOL/L (ref 4–13)
APTT PPP: 139 SECONDS (ref 23–37)
APTT PPP: 27 SECONDS (ref 23–37)
APTT PPP: 36 SECONDS (ref 23–37)
APTT PPP: 40 SECONDS (ref 23–37)
AST SERPL W P-5'-P-CCNC: 39 U/L (ref 5–45)
ATRIAL RATE: 123 BPM
BASOPHILS # BLD AUTO: 0.05 THOUSANDS/ΜL (ref 0–0.1)
BASOPHILS NFR BLD AUTO: 1 % (ref 0–1)
BILIRUB SERPL-MCNC: 0.69 MG/DL (ref 0.2–1)
BUN SERPL-MCNC: 12 MG/DL (ref 5–25)
CALCIUM ALBUM COR SERPL-MCNC: 9.5 MG/DL (ref 8.3–10.1)
CALCIUM SERPL-MCNC: 8.8 MG/DL (ref 8.3–10.1)
CHLORIDE SERPL-SCNC: 109 MMOL/L (ref 100–108)
CO2 SERPL-SCNC: 24 MMOL/L (ref 21–32)
CREAT SERPL-MCNC: 0.66 MG/DL (ref 0.6–1.3)
EOSINOPHIL # BLD AUTO: 0.17 THOUSAND/ΜL (ref 0–0.61)
EOSINOPHIL NFR BLD AUTO: 2 % (ref 0–6)
ERYTHROCYTE [DISTWIDTH] IN BLOOD BY AUTOMATED COUNT: 13.3 % (ref 11.6–15.1)
FIBRINOGEN PPP-MCNC: 97 MG/DL (ref 227–495)
FIBRINOGEN PPP-MCNC: <60 MG/DL (ref 227–495)
GFR SERPL CREATININE-BSD FRML MDRD: 106 ML/MIN/1.73SQ M
GLUCOSE SERPL-MCNC: 195 MG/DL (ref 65–140)
HCT VFR BLD AUTO: 42.2 % (ref 34.8–46.1)
HGB BLD-MCNC: 14.4 G/DL (ref 11.5–15.4)
IMM GRANULOCYTES # BLD AUTO: 0.05 THOUSAND/UL (ref 0–0.2)
IMM GRANULOCYTES NFR BLD AUTO: 1 % (ref 0–2)
INR PPP: 1.44 (ref 0.84–1.19)
LYMPHOCYTES # BLD AUTO: 2.19 THOUSANDS/ΜL (ref 0.6–4.47)
LYMPHOCYTES NFR BLD AUTO: 25 % (ref 14–44)
MAGNESIUM SERPL-MCNC: 1.9 MG/DL (ref 1.6–2.6)
MCH RBC QN AUTO: 29.6 PG (ref 26.8–34.3)
MCHC RBC AUTO-ENTMCNC: 34.1 G/DL (ref 31.4–37.4)
MCV RBC AUTO: 87 FL (ref 82–98)
MONOCYTES # BLD AUTO: 0.74 THOUSAND/ΜL (ref 0.17–1.22)
MONOCYTES NFR BLD AUTO: 9 % (ref 4–12)
NEUTROPHILS # BLD AUTO: 5.43 THOUSANDS/ΜL (ref 1.85–7.62)
NEUTS SEG NFR BLD AUTO: 62 % (ref 43–75)
NRBC BLD AUTO-RTO: 0 /100 WBCS
P AXIS: 56 DEGREES
PHOSPHATE SERPL-MCNC: 3.3 MG/DL (ref 2.7–4.5)
PLATELET # BLD AUTO: 168 THOUSANDS/UL (ref 149–390)
PMV BLD AUTO: 10.4 FL (ref 8.9–12.7)
POTASSIUM SERPL-SCNC: 3.7 MMOL/L (ref 3.5–5.3)
PR INTERVAL: 154 MS
PROT SERPL-MCNC: 6.1 G/DL (ref 6.4–8.2)
PROTHROMBIN TIME: 17.5 SECONDS (ref 11.6–14.5)
QRS AXIS: 67 DEGREES
QRSD INTERVAL: 92 MS
QT INTERVAL: 320 MS
QTC INTERVAL: 458 MS
RBC # BLD AUTO: 4.87 MILLION/UL (ref 3.81–5.12)
SODIUM SERPL-SCNC: 140 MMOL/L (ref 136–145)
T WAVE AXIS: 2 DEGREES
TROPONIN I SERPL-MCNC: 0.56 NG/ML
TROPONIN I SERPL-MCNC: 0.64 NG/ML
VENTRICULAR RATE: 123 BPM
WBC # BLD AUTO: 8.63 THOUSAND/UL (ref 4.31–10.16)

## 2021-01-27 PROCEDURE — 93970 EXTREMITY STUDY: CPT | Performed by: SURGERY

## 2021-01-27 PROCEDURE — 93308 TTE F-UP OR LMTD: CPT

## 2021-01-27 PROCEDURE — 93970 EXTREMITY STUDY: CPT

## 2021-01-27 PROCEDURE — 99232 SBSQ HOSP IP/OBS MODERATE 35: CPT | Performed by: INTERNAL MEDICINE

## 2021-01-27 PROCEDURE — 93308 TTE F-UP OR LMTD: CPT | Performed by: INTERNAL MEDICINE

## 2021-01-27 PROCEDURE — 93325 DOPPLER ECHO COLOR FLOW MAPG: CPT | Performed by: INTERNAL MEDICINE

## 2021-01-27 PROCEDURE — 85025 COMPLETE CBC W/AUTO DIFF WBC: CPT | Performed by: EMERGENCY MEDICINE

## 2021-01-27 PROCEDURE — NC001 PR NO CHARGE: Performed by: INTERNAL MEDICINE

## 2021-01-27 PROCEDURE — 84100 ASSAY OF PHOSPHORUS: CPT | Performed by: EMERGENCY MEDICINE

## 2021-01-27 PROCEDURE — 85384 FIBRINOGEN ACTIVITY: CPT | Performed by: EMERGENCY MEDICINE

## 2021-01-27 PROCEDURE — 84484 ASSAY OF TROPONIN QUANT: CPT | Performed by: EMERGENCY MEDICINE

## 2021-01-27 PROCEDURE — 85730 THROMBOPLASTIN TIME PARTIAL: CPT | Performed by: EMERGENCY MEDICINE

## 2021-01-27 PROCEDURE — 83735 ASSAY OF MAGNESIUM: CPT | Performed by: EMERGENCY MEDICINE

## 2021-01-27 PROCEDURE — 85730 THROMBOPLASTIN TIME PARTIAL: CPT | Performed by: PHYSICIAN ASSISTANT

## 2021-01-27 PROCEDURE — 99291 CRITICAL CARE FIRST HOUR: CPT | Performed by: ANESTHESIOLOGY

## 2021-01-27 PROCEDURE — 85610 PROTHROMBIN TIME: CPT | Performed by: PHYSICIAN ASSISTANT

## 2021-01-27 PROCEDURE — 85384 FIBRINOGEN ACTIVITY: CPT | Performed by: PHYSICIAN ASSISTANT

## 2021-01-27 PROCEDURE — 93321 DOPPLER ECHO F-UP/LMTD STD: CPT | Performed by: INTERNAL MEDICINE

## 2021-01-27 PROCEDURE — 80053 COMPREHEN METABOLIC PANEL: CPT | Performed by: EMERGENCY MEDICINE

## 2021-01-27 RX ORDER — ASPIRIN 81 MG/1
81 TABLET, CHEWABLE ORAL DAILY
Status: DISCONTINUED | OUTPATIENT
Start: 2021-01-27 | End: 2021-01-27

## 2021-01-27 RX ORDER — HEPARIN SODIUM 1000 [USP'U]/ML
8400 INJECTION, SOLUTION INTRAVENOUS; SUBCUTANEOUS ONCE
Status: COMPLETED | OUTPATIENT
Start: 2021-01-27 | End: 2021-01-27

## 2021-01-27 RX ORDER — ASPIRIN 81 MG/1
81 TABLET, CHEWABLE ORAL DAILY
COMMUNITY

## 2021-01-27 RX ORDER — HEPARIN SODIUM 1000 [USP'U]/ML
4200 INJECTION, SOLUTION INTRAVENOUS; SUBCUTANEOUS
Status: DISCONTINUED | OUTPATIENT
Start: 2021-01-27 | End: 2021-01-28

## 2021-01-27 RX ORDER — MAGNESIUM SULFATE HEPTAHYDRATE 40 MG/ML
2 INJECTION, SOLUTION INTRAVENOUS ONCE
Status: COMPLETED | OUTPATIENT
Start: 2021-01-27 | End: 2021-01-27

## 2021-01-27 RX ORDER — HEPARIN SODIUM 1000 [USP'U]/ML
8400 INJECTION, SOLUTION INTRAVENOUS; SUBCUTANEOUS
Status: DISCONTINUED | OUTPATIENT
Start: 2021-01-27 | End: 2021-01-28

## 2021-01-27 RX ORDER — ALBUTEROL SULFATE 90 UG/1
2 AEROSOL, METERED RESPIRATORY (INHALATION) EVERY 6 HOURS PRN
Status: DISCONTINUED | OUTPATIENT
Start: 2021-01-27 | End: 2021-01-28 | Stop reason: HOSPADM

## 2021-01-27 RX ORDER — HEPARIN SODIUM 10000 [USP'U]/100ML
3-30 INJECTION, SOLUTION INTRAVENOUS
Status: DISCONTINUED | OUTPATIENT
Start: 2021-01-27 | End: 2021-01-28

## 2021-01-27 RX ORDER — POTASSIUM CHLORIDE 20 MEQ/1
40 TABLET, EXTENDED RELEASE ORAL ONCE
Status: COMPLETED | OUTPATIENT
Start: 2021-01-27 | End: 2021-01-27

## 2021-01-27 RX ORDER — LEVOTHYROXINE SODIUM 0.03 MG/1
25 TABLET ORAL
Status: DISCONTINUED | OUTPATIENT
Start: 2021-01-27 | End: 2021-01-28 | Stop reason: HOSPADM

## 2021-01-27 RX ADMIN — CHLORHEXIDINE GLUCONATE 0.12% ORAL RINSE 15 ML: 1.2 LIQUID ORAL at 08:03

## 2021-01-27 RX ADMIN — MAGNESIUM SULFATE HEPTAHYDRATE 2 G: 40 INJECTION, SOLUTION INTRAVENOUS at 07:47

## 2021-01-27 RX ADMIN — POTASSIUM CHLORIDE 40 MEQ: 1500 TABLET, EXTENDED RELEASE ORAL at 08:03

## 2021-01-27 RX ADMIN — HEPARIN SODIUM 18 UNITS/KG/HR: 10000 INJECTION, SOLUTION INTRAVENOUS at 10:31

## 2021-01-27 RX ADMIN — LEVOTHYROXINE SODIUM 25 MCG: 25 TABLET ORAL at 05:29

## 2021-01-27 RX ADMIN — HEPARIN SODIUM 8400 UNITS: 1000 INJECTION INTRAVENOUS; SUBCUTANEOUS at 10:30

## 2021-01-27 RX ADMIN — ACETAMINOPHEN 975 MG: 325 TABLET, FILM COATED ORAL at 00:22

## 2021-01-27 NOTE — PROGRESS NOTES
Progress Note - Pulmonary   Reubin Seals 50 y o  female MRN: [id number]  Unit/Bed#: MICU 04 Encounter: [id number]      Assessment and Plan:  Massive pulmonary embolus - provoked by recent COVID infection  - history of previous PE thought to be provoked  Negative hypercoaguability work up in past   - Check ECHO  - L/E dopplers pending  - Start heparin drip  No need to trend fibrinogen  PTT is normal   - D/C ASA while on systemic Heparin  - Minimum 3 months of a/c  Will need o/p evaluation by hematology to determine length of a/c   - OOB today    COVID19   - D/C airborne precautions    Hashimoto's thyroiditis  - Cont Levothyroxine    Overweight with BMI 36 43  - Risk factor for worsened outcomes with COVID  - Needs weight loss  Discussed on rounds  OK to downgrade to med-surg telemetry  Subjective:   51 yo F PMh hashimoto thyroiditis, PE on ASA, pituitary mass recent COVID 1/4/21  Did not require hospitalization for her COVID  Was SOB with ankle pain yesterday, went to ER  Found to be hypoxic at 88% on RA  CTA showed massive PE with saddle embolus and concern for RV strain  Hypoxic requiring 2 5L oxygen  + troponin  Tachy 130's  Treated with systemic TPA and transferred here  Has history of PE at age 25 that was provoked by car ride and OCPs  Was treated with Warfarin and then discontinued  Hypercoaguability workup was negative at the time  Overnight, oxygenation unchanged and HR improved  Feels improved overall  Objective:   Afebrile  Vitals: Blood pressure 139/68, pulse (!) 110, temperature 97 6 °F (36 4 °C), temperature source Oral, resp  rate (!) 38, height 5' 7" (1 702 m), weight 105 kg (232 lb 9 4 oz), last menstrual period 01/16/2021, SpO2 98 % , RA, Body mass index is 36 43 kg/m²        Intake/Output Summary (Last 24 hours) at 1/27/2021 1010  Last data filed at 1/27/2021 0932  Gross per 24 hour   Intake 80 ml   Output 1325 ml   Net -1245 ml         Physical Exam  Gen: Comfortable, no distress  HEENT: NCAT; EOMI; anicteric sclera; MMM  NECK: supple  Cardiac: regular, no m/r/g  Lungs: CTA b/l, no w/r/r  Abdomen: soft, nd, nt  Extremities: no c/c/e  Skin: warm, dry  Psych: cooperative    Labs: I have personally reviewed pertinent lab results  Results from last 7 days   Lab Units 01/27/21  0442 01/26/21  1541   WBC Thousand/uL 8 63 10 82*   HEMOGLOBIN g/dL 14 4 16 5*   HEMATOCRIT % 42 2 48 7*   PLATELETS Thousands/uL 168 214   NEUTROS PCT % 62 78*   MONOS PCT % 9 6      Results from last 7 days   Lab Units 01/27/21  0442 01/26/21  1541   POTASSIUM mmol/L 3 7 3 9   CHLORIDE mmol/L 109* 99*   CO2 mmol/L 24 20*   BUN mg/dL 12 16   CREATININE mg/dL 0 66 1 00   CALCIUM mg/dL 8 8 9 2   ALK PHOS U/L 61 78   ALT U/L 70 100*   AST U/L 39 59*     Results from last 7 days   Lab Units 01/27/21  0442 01/26/21  1541   MAGNESIUM mg/dL 1 9 1 7     Results from last 7 days   Lab Units 01/27/21  0442   PHOSPHORUS mg/dL 3 3      Results from last 7 days   Lab Units 01/27/21  0926 01/27/21  0810 01/27/21  0442 01/27/21  0038  01/26/21  1541   INR  1 44*  --   --   --   --  1 06   PTT seconds  --  27 36 40*   < >  --     < > = values in this interval not displayed  0   Lab Value Date/Time    TROPONINI 0 56 (H) 01/27/2021 0239    TROPONINI 0 64 (H) 01/27/2021 0020    TROPONINI 0 10 (H) 01/26/2021 1541    TROPONINI <0 02 01/10/2021 1908       Labs per my review show normal renal function, polycythemia    Imaging and other studies: I have personally reviewed pertinent films in PACS  CTA chest per my review shows massive PE with saddle embolus and large clot burden    DORITA Ramachandran [facility]

## 2021-01-27 NOTE — H&P
History and Physical - Critical Care   Edward Antunez 50 y o  female MRN: [id number]  Unit/Bed#: [facility] 04 Encounter: [id number]    Reason for Admission / Chief Complaint: massive PE    History of Present Illness:  Edward Antunez is a 50 y o  female with history of Hashimoto's thyroiditis, pituitary microadenoma, provoked PE 17 years ago who presented to [address] [place] [address] on 1/26 for shortness of breath  She was diagnosed with COVID-19 on 01/04/2021  At that time, she was treated with antibiotics and steroids  She had been experiencing residual shortness of breath since that time which acutely worsened around 2:00 p m  She was also experiencing palpitations and chest tightness at that time  She was using a home pulse oximeter which showed that her oxygen levels were in the high 80s  On arrival to the ED [facility], she was noted to have an oxygen saturation of 88% on room air which subsequently improved to 95% on 2 5 L via nasal cannula  She was also found to be tachycardic to the 120s  CT PE study was obtained showing massive PE with saddle embolus  She additionally had increased RV to LV ratio of 1 6  Her initial troponin was 0 10 and D-dimer was greater than 20  After discussion with the critical care team here, she was given systemic tPA and plan was to initiate a heparin drip  She was subsequently transferred [facility] the ICU at [facility] for further management  On arrival to the ICU, she is hemodynamically stable and well-appearing  She is asymptomatic at this time and feels improvement after administration of the tPA  History obtained from chart review and the patient      Past Medical History:  Past Medical History:   Diagnosis Date    Disease of thyroid gland     Pituitary mass ([address] )     Pulmonary embolism (HCC)        Past Surgical History:  Past Surgical History:   Procedure Laterality Date    NO PAST SURGERIES         Past Family History:  Family History   Problem Relation Age of Onset    Diabetes Mother     No Known Problems Father        Social History:  Social History     Tobacco Use   Smoking Status Never Smoker   Smokeless Tobacco Never Used     Social History     Substance and Sexual Activity   Alcohol Use Never    Frequency: Never     Social History     Substance and Sexual Activity   Drug Use Never     Marital Status: /Civil Union      Medications:  Current Facility-Administered Medications   Medication Dose Route Frequency    acetaminophen (TYLENOL) tablet 975 mg  975 mg Oral Q6H PRN    chlorhexidine (PERIDEX) 0 12 % oral rinse 15 mL  15 mL Swish & Spit Q12H [facility]    levothyroxine tablet 25 mcg  25 mcg Oral Early Morning     Home medications:  Prior to Admission medications    Medication Sig Start Date End Date Taking? Authorizing Provider   albuterol (PROVENTIL HFA,VENTOLIN HFA) 90 mcg/act inhaler Inhale 2 puffs every 6 (six) hours as needed for wheezing   Yes Historical Provider, MD   cabergoline (DOSTINEX) 0 5 MG tablet Take 0 25 mg by mouth 2 (two) times a week   Yes Historical Provider, MD   levothyroxine 25 mcg tablet Take 25 mcg by mouth daily   Yes Historical Provider, MD   norethindrone (MICRONOR) 0 35 MG tablet Take 1 tablet by mouth daily   Yes Historical Provider, MD   benzonatate (TESSALON PERLES) 100 mg capsule Take 1 capsule (100 mg total) by mouth every 8 (eight) hours  Patient not taking: Reported on 1/26/2021 1/10/21   Brandi Baez MD   potassium chloride (K-DUR,KLOR-CON) 20 mEq tablet Take 1 tablet (20 mEq total) by mouth 2 (two) times a day  Patient not taking: Reported on 1/26/2021 1/10/21   Brandi Baez MD     Allergies: Allergies   Allergen Reactions    Penicillins Hives       ROS:   Review of Systems   Constitutional: Negative for chills and fever  HENT: Negative for congestion, rhinorrhea and sore throat  Eyes: Negative for visual disturbance  Respiratory: Positive for chest tightness and shortness of breath (Improved)  Negative for cough  Choking:  resolved  Cardiovascular: Positive for palpitations ( resolved)  Negative for chest pain and leg swelling  Gastrointestinal: Negative for abdominal distention, abdominal pain, diarrhea, nausea and vomiting  Genitourinary: Negative for dysuria, flank pain, frequency and urgency  Musculoskeletal: Negative for back pain and gait problem  Skin: Negative for pallor and rash  Neurological: Negative for syncope, weakness, light-headedness and headaches  All other systems reviewed and are negative  Vitals:  Vitals:    21 2200 21 2300 21 0000 21 0100   BP: 134/92 114/76 107/83 118/80   BP Location:   Left arm    Pulse: (!) 112 104 98 92   Resp: (!) 23 22 19 18   Temp:   97 6 °F (36 4 °C)    TempSrc:   Oral    SpO2: 98% 98% 98% 98%   Weight:       Height:         Temperature:   Temp (24hrs), Av 7 °F (36 5 °C), Min:97 1 °F (36 2 °C), Max:98 3 °F (36 8 °C)    Current Temperature: 97 6 °F (36 4 °C)    Weights:   IBW: 61 6 kg  Body mass index is 36 43 kg/m²  Hemodynamic Monitoring:  N/A     Non-Invasive/Invasive Ventilation Settings:  Respiratory    Lab Data (Last 4 hours)    None         O2/Vent Data (Last 4 hours)    None              No results found for: PHART, TXP0MBP, PO2ART, NCS4YGL, A5YHDZZU, BEART, SOURCE  SpO2: SpO2: 98 %     Physical Exam:  Physical Exam  Vitals signs and nursing note reviewed  Constitutional:       General: She is not in acute distress  Appearance: Normal appearance  She is not ill-appearing  HENT:      Head: Normocephalic and atraumatic  Nose: Nose normal       Mouth/Throat:      Mouth: Mucous membranes are moist       Pharynx: No oropharyngeal exudate or posterior oropharyngeal erythema  Eyes:      Extraocular Movements: Extraocular movements intact  Pupils: Pupils are equal, round, and reactive to light  Neck:      Musculoskeletal: Normal range of motion and neck supple  No neck rigidity     Cardiovascular:      Rate and Rhythm: Regular rhythm  Tachycardia present  Heart sounds: No murmur  No friction rub  No gallop  Pulmonary:      Effort: No respiratory distress  Breath sounds: Rales (Bilateral bases) present  No wheezing or rhonchi  Abdominal:      General: Bowel sounds are normal  There is no distension  Palpations: Abdomen is soft  Tenderness: There is no abdominal tenderness  Musculoskeletal: Normal range of motion  General: No swelling or tenderness  Skin:     General: Skin is warm and dry  Capillary Refill: Capillary refill takes less than 2 seconds  Coloration: Skin is not pale  Findings: No rash  Neurological:      General: No focal deficit present  Mental Status: She is alert and oriented to person, place, and time  Comments: Cranial nerves 2-12 intact  Speech normal, no dysarthria  5/5 strength in bilateral upper and lower extremities  Sensation grossly intact in bilateral upper and lower extremities  Normal finger-to-nose, no dysmetria           Labs:  Results from last 7 days   Lab Units 01/26/21  1541   WBC Thousand/uL 10 82*   HEMOGLOBIN g/dL 16 5*   HEMATOCRIT % 48 7*   PLATELETS Thousands/uL 214   NEUTROS PCT % 78*   MONOS PCT % 6     Results from last 7 days   Lab Units 01/26/21  1541   SODIUM mmol/L 136   POTASSIUM mmol/L 3 9   CHLORIDE mmol/L 99*   CO2 mmol/L 20*   ANION GAP mmol/L 17*   BUN mg/dL 16   CREATININE mg/dL 1 00   CALCIUM mg/dL 9 2   ALT U/L 100*   AST U/L 59*   ALK PHOS U/L 78   ALBUMIN g/dL 3 8   TOTAL BILIRUBIN mg/dL 0 61     Results from last 7 days   Lab Units 01/26/21  1541   MAGNESIUM mg/dL 1 7      Results from last 7 days   Lab Units 01/27/21  0038 01/26/21  1735 01/26/21  1541   INR   --   --  1 06   PTT seconds 40* 25  --      Results from last 7 days   Lab Units 01/27/21  0020 01/26/21  1541   TROPONIN I ng/mL 0 64* 0 10*         ABG:No results found for: PHART, TND9RAU, PO2ART, NRT2XWI, S0ASFFGG, BEART, SOURCE  VBG:            Imaging: CT PE  IMPRESSION:     Massive pulmonary emboli including saddle embolus with elevated RV LV ratio confirming right heart strain  Interventional consultation should be considered  I have personally reviewed pertinent reports  Micro:        ______________________________________________________________________    Assessment:   Active Problems:    Acute saddle pulmonary embolism (HCC)    COVID-19    Hashimoto's thyroiditis    Pituitary microadenoma (HCC)  Resolved Problems:    * No resolved hospital problems  *        Plan:    Neuro: no acute issues  - Q 4 hour neuro exams after tPA administration  - stat CT head if acute change in neurologic exam  - Tylenol p r n  for analgesia  - Sleep hygiene    CV:   1  Elevated troponin  - secondary to RV strain with saddle PE as seen on CT  - trend troponins  - EKG without acute ischemia    Pulm:   1  Massive PE  - s/p systemic tPA  - fibrinogen and PTT q 4 hours  - initiate VTE heparin infusion when fibrinogen greater than 150  - maintain SpO2 greater than 90%    GI:  No acute issues    :  No acute issues  - monitor urine output  - trend BUN/creatinine    F/E/N:   F:  None  E:  Replete p r n  N:  Regular diet    ID:   1  COVID-19 infection  - currently no indication for treatment  - already treated with steroids and antibiotics  - monitor for signs of superimposed infection    Heme:   1  COVID-19 induced hypercoagulability  - will be initiated on heparin infusion as discussed above    Endo:   1  History of Hashimoto's thyroiditis  - continue home levothyroxine    Msk/Skin:  No acute issues  - bedrest after tPA infusion    Disposition:  Critical care      Counseling / Coordination of Care  Total Critical Care time spent 15 minutes excluding procedures, teaching and family updates        ______________________________________________________________________    VTE Pharmacologic Prophylaxis: Heparin Drip  VTE Mechanical Prophylaxis: sequential compression device    Invasive lines and devices: Invasive Devices     Peripheral Intravenous Line            Peripheral IV 01/26/21 Left Forearm less than 1 day    Peripheral IV 01/26/21 Right Antecubital less than 1 day                Code Status: Level 1 - Full Code  POA:    POLST:      Given critical illness, patient length of stay will require greater than two midnights  Portions of the record may have been created with voice recognition software  Occasional wrong word or "sound a like" substitutions may have occurred due to the inherent limitations of voice recognition software  Read the chart carefully and recognize, using context, where substitutions have occurred        Oliver Barnett MD

## 2021-01-27 NOTE — UTILIZATION REVIEW
Initial Clinical Review    Admission: Date/Time/Statement:   Admission Orders (From admission, onward)     Ordered        01/26/21 2201  Inpatient Admission  Once                   Orders Placed This Encounter   Procedures    Inpatient Admission     Standing Status:   Standing     Number of Occurrences:   1     Order Specific Question:   Level of Care     Answer:   Critical Care [15]     Order Specific Question:   Estimated length of stay     Answer:   More than 2 Midnights     Order Specific Question:   Certification     Answer:   I certify that inpatient services are medically necessary for this patient for a duration of greater than two midnights  See H&P and MD Progress Notes for additional information about the patient's course of treatment  1/26/2021 (4 hours)  [facility] Emergency Department  Acute saddle pulmonary embolism with acute cor pulmonale, elevated d dimer , elevated troponin, shortness of breath, resolving covid pneumonia   Transfer to [facility] critical care  Prior to arrival[de-identified]   Iv  9% ns bolus, iv alteplase,       Assessment/Plan:     50year old female received from [facility] () ed for inpatient critical care admission to evaluate and treat acute saddle pulmonary embolism  PMHX: COVID 19 PNEUMONIA  Treated in ed with iv alteplase  Patient reports she was recovering from covid 19 at home  She became increasingly short of breath with chest tightness and room air sat in mid to wayne 80s  Imaging shows bilateral large pulmonary saddle emboli     Plan includes:  MIRZA, initiate systemic heparin when fibrinogen >150, supplemental oxygen prn, neuro checks q2 hr / q4 hr, bilateral lower extremity venous duplex, telemetry              Arrival [01/26/21 2100]   Temperature Pulse Respirations Blood Pressure SpO2   98 3 °F (36 8 °C) (!) 122 (!) 26 133/86 98 %      Oral Monitor         No Pain          01/26/21 105 kg (232 lb 9 4 oz)     Additional Vital Signs: Date/Time  Temp  Pulse  Resp  BP  MAP (mmHg)  SpO2   Nasal Cannula O2 Flow Rate (L/min)  O2 Device    01/27/21 0700  97 6 °F (36 4 °C)  78  13  110/66  91  99 %   --  --    01/27/21 0600  --  78  13  112/69  82  99 %   --  --    01/27/21 0500  --  84  21  116/78  88  99 %   --  --    01/27/21 0400  97 6 °F (36 4 °C)  82  14  110/63  80  98 %   2 L/min  Nasal cannula    01/27/21 0300  --  86  16  107/76  86  98 %   --  --    01/27/21 0200  --  86  18  107/80  94  98 %   --  --    01/27/21 0100  --  92  18  118/80  93  98 %   --  --    01/27/21 0000  97 6 °F (36 4 °C)  98  19  107/83  90  98 %   2 L/min  Nasal cannula    01/26/21 2300  --  104  22  114/76  90  98 %   --  --        Date and Time Eye Opening Best Verbal Response Best Motor Response Mikhail Coma Scale Score   01/26/21 1818 4 5 6 15   01/26/21 1512 4 5 6 15             Pertinent Labs/Diagnostic Test Results:       CTA - CHEST WITH IV CONTRAST - PULMONARY ANGIOGRAM     1/26/2021 16:39   OW  Ed        INDICATION:   Shortness of breath  PE suspected, intermediate prob, positive D-dimer  sob, tachy, ddimer high  PULMONARY ARTERIAL TREE:  Large emboli are seen both peripherally and centrally  There is a saddle embolus crossing the midline of both the right and left main pulmonary artery as well as additional emboli crossing bifurcations in the lower lobe   vasculature  Smaller embolus is seen in the middle lobe vasculature and emboli extend into the left upper lobe vasculature    IMPRESSION:     Massive pulmonary emboli including saddle embolus with elevated RV LV ratio confirming right heart strain    Interventional consultation should be considered        VAS lower limb venous duplex study, complete bilateral    (Results Pending)         Results from last 7 days   Lab Units 01/27/21  0442 01/26/21  1541   WBC Thousand/uL 8 63 10 82*   HEMOGLOBIN g/dL 14 4 16 5*   HEMATOCRIT % 42 2 48 7*   PLATELETS Thousands/uL 168 214   NEUTROS ABS Thousands/µL 5  43 8 55*         Results from last 7 days   Lab Units 01/27/21  0442 01/26/21  1541   SODIUM mmol/L 140 136   POTASSIUM mmol/L 3 7 3 9   CHLORIDE mmol/L 109* 99*   CO2 mmol/L 24 20*   ANION GAP mmol/L 7 17*   BUN mg/dL 12 16   CREATININE mg/dL 0 66 1 00   EGFR ml/min/1 73sq m 106 67   CALCIUM mg/dL 8 8 9 2   MAGNESIUM mg/dL 1 9 1 7   PHOSPHORUS mg/dL 3 3  --      Results from last 7 days   Lab Units 01/27/21  0442 01/26/21  1541   AST U/L 39 59*   ALT U/L 70 100*   ALK PHOS U/L 61 78   TOTAL PROTEIN g/dL 6 1* 8 0   ALBUMIN g/dL 3 1* 3 8   TOTAL BILIRUBIN mg/dL 0 69 0 61         Results from last 7 days   Lab Units 01/27/21  0442 01/26/21  1541   GLUCOSE RANDOM mg/dL 195* 308*           Results from last 7 days   Lab Units 01/26/21  1541   CK TOTAL U/L 49     Results from last 7 days   Lab Units 01/27/21  0239 01/27/21  0020 01/26/21  1541   TROPONIN I ng/mL 0 56* 0 64* 0 10*     Results from last 7 days   Lab Units 01/26/21  1541   D-DIMER QUANTITATIVE ug/ml FEU >20 00*     Results from last 7 days   Lab Units 01/27/21  0810 01/27/21  0442 01/27/21  0038  01/26/21  1541   PROTIME seconds  --   --   --   --  13 6   INR   --   --   --   --  1 06   PTT seconds 27 36 40*   < >  --     < > = values in this interval not displayed       Results from last 7 days   Lab Units 01/26/21  1541   TSH 3RD GENERATON uIU/mL 3 188         Results from last 7 days   Lab Units 01/26/21  1541   NT-PRO BNP pg/mL 45     Results from last 7 days   Lab Units 01/26/21  1541   FERRITIN ng/mL 540*         Results from last 7 days   Lab Units 01/26/21  1541   LIPASE u/L 153     Results from last 7 days   Lab Units 01/26/21  1541   CRP mg/L 24 5*       Past Medical History:   Diagnosis Date    Disease of thyroid gland     Pituitary [facility] ([facility]     Pulmonary embolism ([facility] )      Present on Admission:   Hashimoto's thyroiditis   Pituitary microadenoma ([facility] )      Admitting Diagnosis: Pulmonary embolus ([facility] ) [I26 99]     Age/Sex: 50 y o  female Admission Orders:    Scheduled Medications:    heparin, , Intravenous, Once  levothyroxine, 25 mcg, Oral, Early Morning  magnesium sulfate, 2 g, Intravenous, Once      Continuous IV Infusions:     PRN Meds:  acetaminophen, 975 mg, Oral, Q6H PRN        IP CONSULT TO CASE MANAGEMENT    Network Utilization Review Department  ATTENTION: Please call with any questions or concerns to [phone] and carefully listen to the prompts so that you are directed to the right person  All voicemails are confidential   Angelic Stewart all requests for admission clinical reviews, approved or denied determinations and any other requests to dedicated fax number below belonging to the campus where the patient is receiving treatment   List of dedicated fax numbers for the Facilities:  [address] DENIALS (Administrative/Medical Necessity) [phone]   [address] (Maternity/NICU/Pediatrics) [phone]   [address] [place] [address] Dr Pham Leos [phone] (Sarah Corcoran "Mallika" 103) [address] [address] Jericho Berry 1481 [address] [place]) [address] [place] [address] 951 [phone]

## 2021-01-27 NOTE — ASSESSMENT & PLAN NOTE
Previous history of provoked PE 17 years ago  Was treated with 6 months of warfarin  Hypercoagulability workup negative  Patient presents again with acute shortness of breath, hypoxemia, tachycardia in the setting of recent COVID infection 1/4, found to have massive PE with saddle embolus at [address] [place] [address]  Received tPA 1/26 and was transferred to [place] for critical care  Was started on heparin drip 1/27  Heart rate, oxygenation improved      -continue heparin drip  -will likely need long-term anticoagulation  -consider outpatient follow-up with Hematology  -continue 2 L nasal cannula for saturation > 90%  -f/u TTE  -lower extremity Dopplers negative for DVT

## 2021-01-27 NOTE — ASSESSMENT & PLAN NOTE
COVID positive 1/4  Patient was treated outpatient with doxycycline, prednisone, and albuterol inhalers

## 2021-01-27 NOTE — PLAN OF CARE
Problem: Potential for Falls  Goal: Patient will remain free of falls  Description: INTERVENTIONS:  - Assess patient frequently for physical needs  -  Identify cognitive and physical deficits and behaviors that affect risk of falls    -  [place] fall precautions as indicated by assessment   - Educate patient/family on patient safety including physical limitations  - Instruct patient to call for assistance with activity based on assessment  - Modify environment to reduce risk of injury  - Consider OT/PT consult to assist with strengthening/mobility  Outcome: Progressing     Problem: NEUROSENSORY - ADULT  Goal: Achieves stable or improved neurological status  Description: INTERVENTIONS  - Monitor and report changes in neurological status  - Monitor vital signs such as temperature, blood pressure, glucose, and any other labs ordered   - Initiate measures to prevent increased intracranial pressure  - Monitor for seizure activity and implement precautions if appropriate      Outcome: Progressing     Problem: CARDIOVASCULAR - ADULT  Goal: Maintains optimal cardiac output and hemodynamic stability  Description: INTERVENTIONS:  - Monitor I/O, vital signs and rhythm  - Monitor for S/S and trends of decreased cardiac output  - Administer and titrate ordered vasoactive medications to optimize hemodynamic stability  - Assess quality of pulses, skin color and temperature  - Assess for signs of decreased coronary artery perfusion  - Instruct patient to report change in severity of symptoms  Outcome: Progressing  Goal: Absence of cardiac dysrhythmias or at baseline rhythm  Description: INTERVENTIONS:  - Continuous cardiac monitoring, vital signs, obtain 12 lead EKG if ordered  - Administer antiarrhythmic and heart rate control medications as ordered  - Monitor electrolytes and administer replacement therapy as ordered  Outcome: Progressing     Problem: RESPIRATORY - ADULT  Goal: Achieves optimal ventilation and oxygenation  Description: INTERVENTIONS:  - Assess for changes in respiratory status  - Assess for changes in mentation and behavior  - Position to facilitate oxygenation and minimize respiratory effort  - Oxygen administered by appropriate delivery if ordered  - Initiate smoking cessation education as indicated  - Encourage broncho-pulmonary hygiene including cough, deep breathe, Incentive Spirometry  - Assess the need for suctioning and aspirate as needed  - Assess and instruct to report SOB or any respiratory difficulty  - Respiratory Therapy support as indicated  Outcome: Progressing     Problem: PAIN - ADULT  Goal: Verbalizes/displays adequate comfort level or baseline comfort level  Description: Interventions:  - Encourage patient to monitor pain and request assistance  - Assess pain using appropriate pain scale  - Administer analgesics based on type and severity of pain and evaluate response  - Implement non-pharmacological measures as appropriate and evaluate response  - Consider cultural and social influences on pain and pain management  - Notify physician/advanced practitioner if interventions unsuccessful or patient reports new pain  Outcome: Progressing     Problem: Knowledge Deficit  Goal: Patient/family/caregiver demonstrates understanding of disease process, treatment plan, medications, and discharge instructions  Description: Complete learning assessment and assess knowledge base    Interventions:  - Provide teaching at level of understanding  - Provide teaching via preferred learning methods  Outcome: Progressing

## 2021-01-27 NOTE — PROGRESS NOTES
Progress Note - Rosie Cormier 1975, 50 y o  female MRN: [id number]    [facility] Encounter: [id number]    Primary Care Provider: Rafa Perez MD   Date and time admitted to hospital: 1/26/2021  8:39 PM        Hashimoto's thyroiditis  Assessment & Plan  Continue home levothyroxine  COVID-19  Assessment & Plan  COVID positive 1/4  Patient was treated outpatient with doxycycline, prednisone, and albuterol inhalers  * Acute saddle pulmonary embolism (HCC)  Assessment & Plan  Previous history of provoked PE 17 years ago  Was treated with 6 months of warfarin  Hypercoagulability workup negative  Patient presents again with acute shortness of breath, hypoxemia, tachycardia in the setting of recent COVID infection 1/4, found to have massive PE with saddle embolus at [address] [place] [address]  Received tPA 1/26 and was transferred to [facility] for critical care  Was started on heparin drip 1/27  Heart rate, oxygenation improved  -continue heparin drip  -will likely need long-term anticoagulation  -consider outpatient follow-up with Hematology  -continue 2 L nasal cannula for saturation > 90%  -f/u TTE  -lower extremity Dopplers negative for DVT        Code Status: Level 1 - Full Code  POA:    POLST:      Reason for ICU admission:   Acute saddle pulmonary embolus    Active problems:   Principal Problem:    Acute saddle pulmonary embolism ([facility] )  Active Problems:    COVID-19    Hashimoto's thyroiditis    Pituitary microadenoma ([facility] )  Resolved Problems:    * No resolved hospital problems  *      Consultants:   None    History of Present Illness:   H&P by Dr Brennen Donald:  Rosie Cormier is a 50 y o  female with history of Hashimoto's thyroiditis, pituitary microadenoma, provoked PE 17 years ago who presented to [address] [place] [address] on 1/26 for shortness of breath  She was diagnosed with COVID-19 on 01/04/2021  At that time, she was treated with antibiotics and steroids    She had been experiencing residual shortness of breath since that time which acutely worsened around 2:00 p m  She was also experiencing palpitations and chest tightness at that time  She was using a home pulse oximeter which showed that her oxygen levels were in the high 80s  On arrival to the ED [facility], she was noted to have an oxygen saturation of 88% on room air which subsequently improved to 95% on 2 5 L via nasal cannula  She was also found to be tachycardic to the 120s  CT PE study was obtained showing massive PE with saddle embolus  She additionally had increased RV to LV ratio of 1 6  Her initial troponin was 0 10 and D-dimer was greater than 20  After discussion with the critical care team here, she was given systemic tPA and plan was to initiate a heparin drip  She was subsequently transferred to the ICU at [facility] for further management  On arrival to the ICU, she is hemodynamically stable and well-appearing  She is asymptomatic at this time and feels improvement after administration of the tPA "    Summary of clinical course:   Received tPA 1/26  Heparin drip started 1/27  Lower extremity Dopplers negative for DVT  TTE ordered  She is hemodynamically stable and well appearing  Recent or scheduled procedures: Outstanding/pending diagnostics:   TTE    Cultures:          Mobilization Plan:       Nutrition Plan:       Invasive Devices Review  Invasive Devices     Peripheral Intravenous Line            Peripheral IV 01/26/21 Left Forearm less than 1 day    Peripheral IV 01/26/21 Right Antecubital less than 1 day                Rationale for remaining devices:     VTE Pharmacologic Prophylaxis: Heparin Drip  VTE Mechanical Prophylaxis: sequential compression device        Spoke with Dr Kiersten Schmidt  regarding transfer  Please contact critical care via [employer] with any questions or concerns  Portions of the record may have been created with voice recognition software    Occasional wrong word or "sound a like" substitutions may have occurred due to the inherent limitations of voice recognition software  Read the chart carefully and recognize, using context, where substitutions have occurred

## 2021-01-27 NOTE — PLAN OF CARE
Problem: Potential for Falls  Goal: Patient will remain free of falls  Description: INTERVENTIONS:  - Assess patient frequently for physical needs  -  Identify cognitive and physical deficits and behaviors that affect risk of falls    -  [place] fall precautions as indicated by assessment   - Educate patient/family on patient safety including physical limitations  - Instruct patient to call for assistance with activity based on assessment  - Modify environment to reduce risk of injury  - Consider OT/PT consult to assist with strengthening/mobility  Outcome: Progressing     Problem: NEUROSENSORY - ADULT  Goal: Achieves stable or improved neurological status  Description: INTERVENTIONS  - Monitor and report changes in neurological status  - Monitor vital signs such as temperature, blood pressure, glucose, and any other labs ordered   - Initiate measures to prevent increased intracranial pressure  - Monitor for seizure activity and implement precautions if appropriate      Outcome: Progressing     Problem: CARDIOVASCULAR - ADULT  Goal: Maintains optimal cardiac output and hemodynamic stability  Description: INTERVENTIONS:  - Monitor I/O, vital signs and rhythm  - Monitor for S/S and trends of decreased cardiac output  - Administer and titrate ordered vasoactive medications to optimize hemodynamic stability  - Assess quality of pulses, skin color and temperature  - Assess for signs of decreased coronary artery perfusion  - Instruct patient to report change in severity of symptoms  Outcome: Progressing  Goal: Absence of cardiac dysrhythmias or at baseline rhythm  Description: INTERVENTIONS:  - Continuous cardiac monitoring, vital signs, obtain 12 lead EKG if ordered  - Administer antiarrhythmic and heart rate control medications as ordered  - Monitor electrolytes and administer replacement therapy as ordered  Outcome: Progressing     Problem: RESPIRATORY - ADULT  Goal: Achieves optimal ventilation and oxygenation  Description: INTERVENTIONS:  - Assess for changes in respiratory status  - Assess for changes in mentation and behavior  - Position to facilitate oxygenation and minimize respiratory effort  - Oxygen administered by appropriate delivery if ordered  - Initiate smoking cessation education as indicated  - Encourage broncho-pulmonary hygiene including cough, deep breathe, Incentive Spirometry  - Assess the need for suctioning and aspirate as needed  - Assess and instruct to report SOB or any respiratory difficulty  - Respiratory Therapy support as indicated  Outcome: Progressing     Problem: PAIN - ADULT  Goal: Verbalizes/displays adequate comfort level or baseline comfort level  Description: Interventions:  - Encourage patient to monitor pain and request assistance  - Assess pain using appropriate pain scale  - Administer analgesics based on type and severity of pain and evaluate response  - Implement non-pharmacological measures as appropriate and evaluate response  - Consider cultural and social influences on pain and pain management  - Notify physician/advanced practitioner if interventions unsuccessful or patient reports new pain  Outcome: Progressing     Problem: Knowledge Deficit  Goal: Patient/family/caregiver demonstrates understanding of disease process, treatment plan, medications, and discharge instructions  Description: Complete learning assessment and assess knowledge base    Interventions:  - Provide teaching at level of understanding  - Provide teaching via preferred learning methods  Outcome: Progressing

## 2021-01-27 NOTE — UTILIZATION REVIEW
Notification of Inpatient Admission/Inpatient Authorization Request   This is a Notification of Inpatient Admission for [address]  Be advised that this patient was admitted to our facility under Inpatient Status  Contact Jose Antonio Bryant at [phone] for additional admission information  [facility] DEPT  DEDICATED [phone]  Patient Name:   Pardeep Naik   YOB: 1975       [address]   [address]:   Oleg Lara  Tax ID: [id number]  NPI: [id number] Attending Provider/NPI:  Phone:  Address: Maris Gray [[id number]]  [phone]  Same as KI/Sung Torrez 1106 of Service Code: 24 Place of Service Name:  [address] [place] [address]   Start Date: 1/26/21 2039 Discharge Date & Time: No discharge date for patient encounter  Type of Admission: Inpatient Status Discharge Disposition (if discharged): [address] [place] [address]   Patient Diagnoses: Pulmonary embolus [facility]) [I26 99]     Orders: Admission Orders (From admission, onward)     Ordered        01/26/21 2201  Inpatient Admission  Once                    Assigned Utilization Review Contact: Jose Antonio Bryant  Utilization   Network Utilization Review Department  Phone: [phone]; Fax [phone]  Email: [email or web address]   ATTENTION PAYERS: Please call the assigned Utilization  directly with any questions or concerns ALL voicemails in the department are confidential  Send all requests for admission clinical reviews, approved or denied determinations and any other requests to dedicated fax number belonging to the campus where the patient is receiving treatment

## 2021-01-27 NOTE — CASE MANAGEMENT
LOS 17hours  No bundle no DME  Called patients  Vika Parry [phone] to complete open  Pt live with spouse 1 story home 1STE  NO DME  No home O2  Independent PTA  Currently working from home  Drives  Uses [address] in [facility]  Has LW   does not know where it is   works 6pm to [place] for [employer]  Family will transport the patient home  CM reviewed d/c planning process including the following: identifying help at home, patient preference for d/c planning needs, Discharge Lounge, [facility] Meds to Bed program, availability of treatment team to discuss questions or concerns patient and/or family may have regarding understanding medications and recognizing signs and symptoms once discharged  CM also encouraged patient to follow up with all recommended appointments after discharge  Patient advised of importance for patient and family to participate in managing patients medical well being

## 2021-01-28 VITALS
HEART RATE: 69 BPM | DIASTOLIC BLOOD PRESSURE: 67 MMHG | RESPIRATION RATE: 18 BRPM | BODY MASS INDEX: 36.26 KG/M2 | TEMPERATURE: 98.2 F | WEIGHT: 231.04 LBS | OXYGEN SATURATION: 96 % | SYSTOLIC BLOOD PRESSURE: 115 MMHG | HEIGHT: 67 IN

## 2021-01-28 LAB
ANION GAP SERPL CALCULATED.3IONS-SCNC: 7 MMOL/L (ref 4–13)
APTT PPP: 78 SECONDS (ref 23–37)
APTT PPP: 85 SECONDS (ref 23–37)
BASOPHILS # BLD AUTO: 0.06 THOUSANDS/ΜL (ref 0–0.1)
BASOPHILS NFR BLD AUTO: 1 % (ref 0–1)
BUN SERPL-MCNC: 12 MG/DL (ref 5–25)
CALCIUM SERPL-MCNC: 8.5 MG/DL (ref 8.3–10.1)
CHLORIDE SERPL-SCNC: 107 MMOL/L (ref 100–108)
CO2 SERPL-SCNC: 24 MMOL/L (ref 21–32)
CREAT SERPL-MCNC: 0.76 MG/DL (ref 0.6–1.3)
EOSINOPHIL # BLD AUTO: 0.49 THOUSAND/ΜL (ref 0–0.61)
EOSINOPHIL NFR BLD AUTO: 7 % (ref 0–6)
ERYTHROCYTE [DISTWIDTH] IN BLOOD BY AUTOMATED COUNT: 13.6 % (ref 11.6–15.1)
GFR SERPL CREATININE-BSD FRML MDRD: 94 ML/MIN/1.73SQ M
GLUCOSE SERPL-MCNC: 197 MG/DL (ref 65–140)
HCT VFR BLD AUTO: 43.1 % (ref 34.8–46.1)
HGB BLD-MCNC: 14.5 G/DL (ref 11.5–15.4)
IMM GRANULOCYTES # BLD AUTO: 0.09 THOUSAND/UL (ref 0–0.2)
IMM GRANULOCYTES NFR BLD AUTO: 1 % (ref 0–2)
LYMPHOCYTES # BLD AUTO: 2.23 THOUSANDS/ΜL (ref 0.6–4.47)
LYMPHOCYTES NFR BLD AUTO: 33 % (ref 14–44)
MAGNESIUM SERPL-MCNC: 2.3 MG/DL (ref 1.6–2.6)
MCH RBC QN AUTO: 29.5 PG (ref 26.8–34.3)
MCHC RBC AUTO-ENTMCNC: 33.6 G/DL (ref 31.4–37.4)
MCV RBC AUTO: 88 FL (ref 82–98)
MONOCYTES # BLD AUTO: 0.59 THOUSAND/ΜL (ref 0.17–1.22)
MONOCYTES NFR BLD AUTO: 9 % (ref 4–12)
NEUTROPHILS # BLD AUTO: 3.29 THOUSANDS/ΜL (ref 1.85–7.62)
NEUTS SEG NFR BLD AUTO: 49 % (ref 43–75)
NRBC BLD AUTO-RTO: 0 /100 WBCS
PHOSPHATE SERPL-MCNC: 3 MG/DL (ref 2.7–4.5)
PLATELET # BLD AUTO: 156 THOUSANDS/UL (ref 149–390)
PMV BLD AUTO: 10.4 FL (ref 8.9–12.7)
POTASSIUM SERPL-SCNC: 3.8 MMOL/L (ref 3.5–5.3)
RBC # BLD AUTO: 4.92 MILLION/UL (ref 3.81–5.12)
SODIUM SERPL-SCNC: 138 MMOL/L (ref 136–145)
WBC # BLD AUTO: 6.75 THOUSAND/UL (ref 4.31–10.16)

## 2021-01-28 PROCEDURE — 99232 SBSQ HOSP IP/OBS MODERATE 35: CPT | Performed by: INTERNAL MEDICINE

## 2021-01-28 PROCEDURE — 85730 THROMBOPLASTIN TIME PARTIAL: CPT | Performed by: INTERNAL MEDICINE

## 2021-01-28 PROCEDURE — 84100 ASSAY OF PHOSPHORUS: CPT | Performed by: PHYSICIAN ASSISTANT

## 2021-01-28 PROCEDURE — 85025 COMPLETE CBC W/AUTO DIFF WBC: CPT | Performed by: PHYSICIAN ASSISTANT

## 2021-01-28 PROCEDURE — 83735 ASSAY OF MAGNESIUM: CPT | Performed by: PHYSICIAN ASSISTANT

## 2021-01-28 PROCEDURE — 99239 HOSP IP/OBS DSCHRG MGMT >30: CPT | Performed by: INTERNAL MEDICINE

## 2021-01-28 PROCEDURE — 80048 BASIC METABOLIC PNL TOTAL CA: CPT | Performed by: PHYSICIAN ASSISTANT

## 2021-01-28 RX ORDER — ACETAMINOPHEN 325 MG/1
975 TABLET ORAL EVERY 6 HOURS PRN
Qty: 30 TABLET | Refills: 0 | Status: SHIPPED | OUTPATIENT
Start: 2021-01-28

## 2021-01-28 RX ADMIN — HEPARIN SODIUM 15 UNITS/KG/HR: 10000 INJECTION, SOLUTION INTRAVENOUS at 00:45

## 2021-01-28 RX ADMIN — APIXABAN 10 MG: 5 TABLET, FILM COATED ORAL at 10:46

## 2021-01-28 RX ADMIN — LEVOTHYROXINE SODIUM 25 MCG: 25 TABLET ORAL at 06:20

## 2021-01-28 NOTE — PLAN OF CARE
Problem: Potential for Falls  Goal: Patient will remain free of falls  Description: INTERVENTIONS:  - Assess patient frequently for physical needs  -  Identify cognitive and physical deficits and behaviors that affect risk of falls    -  [place] fall precautions as indicated by assessment   - Educate patient/family on patient safety including physical limitations  - Instruct patient to call for assistance with activity based on assessment  - Modify environment to reduce risk of injury  - Consider OT/PT consult to assist with strengthening/mobility  Outcome: Progressing     Problem: NEUROSENSORY - ADULT  Goal: Achieves stable or improved neurological status  Description: INTERVENTIONS  - Monitor and report changes in neurological status  - Monitor vital signs such as temperature, blood pressure, glucose, and any other labs ordered   - Initiate measures to prevent increased intracranial pressure  - Monitor for seizure activity and implement precautions if appropriate      Outcome: Progressing     Problem: CARDIOVASCULAR - ADULT  Goal: Maintains optimal cardiac output and hemodynamic stability  Description: INTERVENTIONS:  - Monitor I/O, vital signs and rhythm  - Monitor for S/S and trends of decreased cardiac output  - Administer and titrate ordered vasoactive medications to optimize hemodynamic stability  - Assess quality of pulses, skin color and temperature  - Assess for signs of decreased coronary artery perfusion  - Instruct patient to report change in severity of symptoms  Outcome: Progressing  Goal: Absence of cardiac dysrhythmias or at baseline rhythm  Description: INTERVENTIONS:  - Continuous cardiac monitoring, vital signs, obtain 12 lead EKG if ordered  - Administer antiarrhythmic and heart rate control medications as ordered  - Monitor electrolytes and administer replacement therapy as ordered  Outcome: Progressing     Problem: RESPIRATORY - ADULT  Goal: Achieves optimal ventilation and oxygenation  Description: INTERVENTIONS:  - Assess for changes in respiratory status  - Assess for changes in mentation and behavior  - Position to facilitate oxygenation and minimize respiratory effort  - Oxygen administered by appropriate delivery if ordered  - Initiate smoking cessation education as indicated  - Encourage broncho-pulmonary hygiene including cough, deep breathe, Incentive Spirometry  - Assess the need for suctioning and aspirate as needed  - Assess and instruct to report SOB or any respiratory difficulty  - Respiratory Therapy support as indicated  Outcome: Progressing     Problem: PAIN - ADULT  Goal: Verbalizes/displays adequate comfort level or baseline comfort level  Description: Interventions:  - Encourage patient to monitor pain and request assistance  - Assess pain using appropriate pain scale  - Administer analgesics based on type and severity of pain and evaluate response  - Implement non-pharmacological measures as appropriate and evaluate response  - Consider cultural and social influences on pain and pain management  - Notify physician/advanced practitioner if interventions unsuccessful or patient reports new pain  Outcome: Progressing     Problem: Knowledge Deficit  Goal: Patient/family/caregiver demonstrates understanding of disease process, treatment plan, medications, and discharge instructions  Description: Complete learning assessment and assess knowledge base    Interventions:  - Provide teaching at level of understanding  - Provide teaching via preferred learning methods  Outcome: Progressing

## 2021-01-28 NOTE — DISCHARGE SUMMARY
Discharge- Pardeep Cones 1975, 50 y o  female MRN: [id number]    Unit/Bed#: [facility] 915-01 Encounter: [id number]    Primary Care Provider: Thais Balderrama MD   Date and time admitted to hospital: 1/26/2021  8:39 PM        Pituitary microadenoma [facility])  Assessment & Plan  Resolved on most recent MRI as per Pulmonology     Hashimoto's thyroiditis  Assessment & Plan  On Levothyroxine   Currently stable       COVID-19  Assessment & Plan  Hx of Covid 19 diagnosed 1/4/21  Not oxygen dependent at the time of discharge       * Acute saddle pulmonary embolism ([facility] [address] )  Assessment & Plan  Presented sob with tachycardia sudden onset; 88% O2 sat on RA   Hx of provoked PE (travel and OCP) 17 years ago; 6 month Warfarin at the time  CTA with evidence of saddle embolus  Started on Oxygen NC    Administered systemic t-PA; started on Heparin inpatient; transitioned to Eliquis   Echocardiogram negative for thrombus/clot; significant for right sided heart strain   Doppler negative for LE DVT   Discharge on Eliquis 10mg BID for 7 days; followed by 5mg BID thereafter for 6 months as per Pumlmonology  Consider outpatient f/u with Hematology   Weaned off O2; sat 96% on RA   Pulmonology on board; Recommend outpatient hypercoagulability work up as outpatient with Hematology; Recommend 6 months of anticoagulation                Discharging Physician / Practitioner: William Nickerson MD  PCP: Thais Balderrama MD  Admission Date:   Admission Orders (From admission, onward)     Ordered        01/26/21 2201  Inpatient Admission  Once                   Discharge Date: 01/28/21    Resolved Problems  Date Reviewed: 1/27/2021    None          Consultations During Hospital Stay:  · Pulmonary    Procedures Performed:   · CTA for PE; Doppler study for DVT; Echo     Significant Findings / Test Results:   · Elevated D-dimer; CTA positive for saddle embolus;  Doppler negative; Echo showing right heart strain    Incidental Findings:   · None      Test Results Pending at Discharge (will require follow up): · None      Outpatient Tests Requested:  · None     Complications:      Reason for Admission: saddle PE    Hospital Course:     Choco Mancini is a 50 y o  female patient who originally presented to the hospital on 1/26/2021 due to large saddle PE  Patient was given systemic t-PA then started on Heparin drip; supplemental oxygen was started  Doppler was negative for DVT; Echo showed right heart strain but no evidence of clot; Patient has hx of PE 17 years ago provoked by travel and OCP at the time  Patient's symptoms improved during hospital stay; weaned off Oxygen; transitioned to PO Eliquis  Pulmonary to recommend 6 month of anticoagulation and outpatient f/u with Hematology for hypercoagulability workup  Recommend f/u with PCP for repeat echo in 3 months or earlier if symptoms return  The patient, initially admitted to the hospital as inpatient, was discharged earlier than expected given the following: Improvement of symptoms; outaptient follow up   Please see above list of diagnoses and related plan for additional information  Condition at Discharge: good     Discharge Day Visit / Exam:     * Please refer to separate progress note for these details *    Discussion with Family: No     Discharge instructions/Information to patient and family:   See after visit summary for information provided to patient and family  Provisions for Follow-Up Care:  See after visit summary for information related to follow-up care and any pertinent home health orders  Disposition:     Home    For Discharges to [facility]:   · Not Applicable to this Patient - Not Applicable to this Patient    Planned Readmission: No      Discharge Statement:  I spent 60 minutes discharging the patient  This time was spent on the day of discharge  I had direct contact with the patient on the day of discharge   Greater than 50% of the total time was spent examining patient, answering all patient questions, arranging and discussing plan of care with patient as well as directly providing post-discharge instructions  Additional time then spent on discharge activities  Discharge Medications:  See after visit summary for reconciled discharge medications provided to patient and family        ** Please Note: This note has been constructed using a voice recognition system **

## 2021-01-28 NOTE — CASE MANAGEMENT
Met with pt,  Provided Eliquis information packet and co-pay card  She will obtain first month free through [employer]  [facility] services

## 2021-01-28 NOTE — PROGRESS NOTES
Progress Note - Pulmonary   Mio Joe 50 y o  female MRN: [id number]  Unit/Bed#: [facility] 915-01 Encounter: [id number]      Assessment:    1  Acute saddle pulmonary embolism- received systemic TPA now on heparin drip  VAS lower limb duplex negative for DVT  Echo with normal EF and normal RV function  Likely provoked due to [address] recent infection  2  Hx of PE- 17 years ago, presumed provoked due to being on birth control and travel  Reports having comprehensive hypercoagulable work up at that time that was negative  3  Hx of COVID19 infection- on 01/04/2021 treated with prednisone and doxycycline  On room air now  4  Hx of Hashimotos thyroiditis  5  Hx of pituitary microadenoma- resolved on most recent MRI report reviewed in care everywhere      Plan:    · Patient on room air with O2 sat 96%  · Transition to p o  Anticoagulation per primary service and insurance coverage  · Will need anticoagulation for at least 3-6 months but may still be considered provoked in the setting of COVID-19  · Recommend follow-up with Hematology as an outpatient and possibly repeat hypercoagulable workup  · Age-appropriate cancer screening is recommended    Subjective:   Patient seen and examined bedside  No acute events overnight  Patient feels well  No new complaints  On room air  Review of Systems   Constitutional: Negative for chills and fever  HENT: Negative for congestion, postnasal drip and rhinorrhea  Eyes: Negative for itching  Respiratory: Negative for cough, shortness of breath, wheezing and stridor  Cardiovascular: Negative for chest pain, palpitations and leg swelling  Gastrointestinal: Negative for abdominal distention, abdominal pain, nausea and vomiting  Genitourinary: Negative for dysuria and flank pain  Musculoskeletal: Negative for arthralgias and myalgias  Skin: Negative for color change  Neurological: Negative for dizziness, light-headedness and headaches  Psychiatric/Behavioral: Negative  Objective:     Vitals:    01/27/21 1933 01/27/21 2236 01/28/21 0559 01/28/21 0613   BP: 117/76 123/77  115/67   BP Location:       Pulse: 98 98  69   Resp: 18 18  18   Temp: 98 5 °F (36 9 °C) 99 4 °F (37 4 °C)  98 2 °F (36 8 °C)   TempSrc:       SpO2: 94% 94%  96%   Weight:   105 kg (231 lb 0 7 oz)    Height:               Intake/Output Summary (Last 24 hours) at 1/28/2021 0857  Last data filed at 1/27/2021 2236  Gross per 24 hour   Intake 542 06 ml   Output 725 ml   Net -182 94 ml         Physical Exam  Constitutional:       General: She is not in acute distress  Appearance: She is not diaphoretic  HENT:      Head: Normocephalic and atraumatic  Nose: Nose normal       Mouth/Throat:      Pharynx: No oropharyngeal exudate  Eyes:      General: No scleral icterus  Conjunctiva/sclera: Conjunctivae normal       Pupils: Pupils are equal, round, and reactive to light  Neck:      Musculoskeletal: Normal range of motion and neck supple  Thyroid: No thyromegaly  Vascular: No JVD  Trachea: No tracheal deviation  Cardiovascular:      Rate and Rhythm: Normal rate and regular rhythm  Heart sounds: Normal heart sounds  No murmur  No friction rub  No gallop  Pulmonary:      Effort: Pulmonary effort is normal  No respiratory distress  Breath sounds: Normal breath sounds  No stridor  No wheezing or rales  Abdominal:      General: Bowel sounds are normal  There is no distension  Palpations: Abdomen is soft  Tenderness: There is no abdominal tenderness  There is no guarding or rebound  Musculoskeletal: Normal range of motion  General: No deformity  Lymphadenopathy:      Cervical: No cervical adenopathy  Skin:     General: Skin is warm  Findings: No erythema or rash  Neurological:      Mental Status: She is alert and oriented to person, place, and time  Cranial Nerves: No cranial nerve deficit  Sensory: No sensory deficit  Labs:  I have personally reviewed pertinent lab results  Results from last 7 days   Lab Units 01/28/21  0621 01/27/21  0442 01/26/21  1541   WBC Thousand/uL 6 75 8 63 10 82*   HEMOGLOBIN g/dL 14 5 14 4 16 5*   HEMATOCRIT % 43 1 42 2 48 7*   PLATELETS Thousands/uL 156 168 214   NEUTROS PCT % 49 62 78*   MONOS PCT % 9 9 6      Results from last 7 days   Lab Units 01/28/21  0621 01/27/21  0442 01/26/21  1541   POTASSIUM mmol/L 3 8 3 7 3 9   CHLORIDE mmol/L 107 109* 99*   CO2 mmol/L 24 24 20*   BUN mg/dL 12 12 16   CREATININE mg/dL 0 76 0 66 1 00   CALCIUM mg/dL 8 5 8 8 9 2   ALK PHOS U/L  --  61 78   ALT U/L  --  70 100*   AST U/L  --  39 59*     Results from last 7 days   Lab Units 01/28/21  0621 01/27/21  0442 01/26/21  1541   MAGNESIUM mg/dL 2 3 1 9 1 7     Results from last 7 days   Lab Units 01/28/21  0621 01/27/21  0442   PHOSPHORUS mg/dL 3 0 3 3      Results from last 7 days   Lab Units 01/28/21  0621 01/28/21  0127 01/27/21  1607 01/27/21  0926  01/26/21  1541   INR   --   --   --  1 44*  --  1 06   PTT seconds 85* 78* 139*  --    < >  --     < > = values in this interval not displayed  0   Lab Value Date/Time    TROPONINI 0 56 (H) 01/27/2021 0239    TROPONINI 0 64 (H) 01/27/2021 0020    TROPONINI 0 10 (H) 01/26/2021 1541    TROPONINI <0 02 01/10/2021 1908       Microbiology:  Blood cultures 1/2 coag-negative staph    Imaging and other studies: I have personally reviewed pertinent reports     and I have personally reviewed pertinent films in PACS  CT a-massive pulmonary emboli with saddle embolus      Kika Jain MD  Pulmonary & Critical Care Fellow, [facility]

## 2021-01-28 NOTE — PLAN OF CARE
Problem: Potential for Falls  Goal: Patient will remain free of falls  Description: INTERVENTIONS:  - Assess patient frequently for physical needs  -  Identify cognitive and physical deficits and behaviors that affect risk of falls    -  [place] fall precautions as indicated by assessment   - Educate patient/family on patient safety including physical limitations  - Instruct patient to call for assistance with activity based on assessment  - Modify environment to reduce risk of injury  - Consider OT/PT consult to assist with strengthening/mobility  Outcome: Progressing     Problem: NEUROSENSORY - ADULT  Goal: Achieves stable or improved neurological status  Description: INTERVENTIONS  - Monitor and report changes in neurological status  - Monitor vital signs such as temperature, blood pressure, glucose, and any other labs ordered   - Initiate measures to prevent increased intracranial pressure  - Monitor for seizure activity and implement precautions if appropriate      Outcome: Progressing     Problem: CARDIOVASCULAR - ADULT  Goal: Maintains optimal cardiac output and hemodynamic stability  Description: INTERVENTIONS:  - Monitor I/O, vital signs and rhythm  - Monitor for S/S and trends of decreased cardiac output  - Administer and titrate ordered vasoactive medications to optimize hemodynamic stability  - Assess quality of pulses, skin color and temperature  - Assess for signs of decreased coronary artery perfusion  - Instruct patient to report change in severity of symptoms  Outcome: Progressing  Goal: Absence of cardiac dysrhythmias or at baseline rhythm  Description: INTERVENTIONS:  - Continuous cardiac monitoring, vital signs, obtain 12 lead EKG if ordered  - Administer antiarrhythmic and heart rate control medications as ordered  - Monitor electrolytes and administer replacement therapy as ordered  Outcome: Progressing     Problem: RESPIRATORY - ADULT  Goal: Achieves optimal ventilation and oxygenation  Description: INTERVENTIONS:  - Assess for changes in respiratory status  - Assess for changes in mentation and behavior  - Position to facilitate oxygenation and minimize respiratory effort  - Oxygen administered by appropriate delivery if ordered  - Initiate smoking cessation education as indicated  - Encourage broncho-pulmonary hygiene including cough, deep breathe, Incentive Spirometry  - Assess the need for suctioning and aspirate as needed  - Assess and instruct to report SOB or any respiratory difficulty  - Respiratory Therapy support as indicated  Outcome: Progressing     Problem: PAIN - ADULT  Goal: Verbalizes/displays adequate comfort level or baseline comfort level  Description: Interventions:  - Encourage patient to monitor pain and request assistance  - Assess pain using appropriate pain scale  - Administer analgesics based on type and severity of pain and evaluate response  - Implement non-pharmacological measures as appropriate and evaluate response  - Consider cultural and social influences on pain and pain management  - Notify physician/advanced practitioner if interventions unsuccessful or patient reports new pain  Outcome: Progressing     Problem: Knowledge Deficit  Goal: Patient/family/caregiver demonstrates understanding of disease process, treatment plan, medications, and discharge instructions  Description: Complete learning assessment and assess knowledge base    Interventions:  - Provide teaching at level of understanding  - Provide teaching via preferred learning methods  Outcome: Progressing

## 2021-01-28 NOTE — ASSESSMENT & PLAN NOTE
Presented sob with tachycardia sudden onset; 88% O2 sat on RA   Hx of provoked PE (travel and OCP) 17 years ago; 6 month Warfarin at the time  CTA with evidence of saddle embolus  Started on Oxygen NC    Administered systemic t-PA; started on Heparin inpatient; transitioned to Eliquis   Echocardiogram negative for thrombus/clot; significant for right sided heart strain   Doppler negative for LE DVT   Discharge on Eliquis 10mg BID for 7 days; followed by 5mg BID thereafter for 6 months as per Pumlmonology  Consider outpatient f/u with Hematology   Weaned off O2; sat 96% on RA   Pulmonology on board; Recommend outpatient hypercoagulability work up as outpatient with Hematology;  Recommend 6 months of anticoagulation

## 2021-01-29 ENCOUNTER — TELEPHONE (OUTPATIENT)
Dept: HEMATOLOGY ONCOLOGY | Facility: CLINIC | Age: 46
End: 2021-01-29

## 2021-01-29 NOTE — TELEPHONE ENCOUNTER
New Patient Encounter    New Patient Intake Form   Patient Details:  Robina Melton  1975  [id number]    Background Information:  [address] starts by opening a telephone encounter and gathering the following information   Who is calling to schedule? If not self, relationship to patient? [facility]IM   Referring Provider [facility]   What is the diagnosis? Saddle pulmonary embolism, hypercoagulability work up   Is this diagnosis confirmed? Yes   When was the diagnosis? 1/2021   Is there a confirmed diagnosis from a biopsy/tissue reviewed by pathology? Were outside slides requested? Is patient aware of diagnosis? Yes   Is there a personal history and what kind? Is there a family history and what kind? Reason for visit? New Diagnosis   Have you had any imaging or labs done? If so: when, where? yes  [facility]   Are records in [facility]? yes   If patient has a prior history of breast cancer were old records obtained? Was the patient told to bring a disk? Does the patient smoke or Vape? no   If yes, how many packs or cartridges per day? Scheduling Information:   [facility] [place]     Are there any dates/time the patient cannot be seen? Miscellaneous:pt thought she was seen inpt, but there is no consult in chart, so she has been scheduled as new pt  After completing the above information, please route to Financial Counselor and the appropriate Nurse Navigator for review

## 2021-02-10 NOTE — PROGRESS NOTES
[address] [facility] - Hematology & Medical Oncology  Outpatient Visit Encounter Note      Eren Cervantes 50 y o  female 1975 [id number] Date:  2/11/2021    HEMATOLOGICAL HISTORY      1  Provoked Pulmonary Embolism (Dx: 2004) - Rx: 6 mo warfarin then daily 81mg ASA for prophylaxis  - secondary to travel and OCP use  2  Acute Saddle Pulmonary Embolism (Dx: 1/26/2021) - Rx: systemic t-PA and heparin gtt, transitioned to Eliquis 10mg BID for 7 days, followed by 5mg BID for 6 months      Shaquille Andujar is a 66-year-old female with recent COVID infection, history of provoked PE, Hashimoto thyroiditis, and pituitary mass who is seeing me in consultation today forhypercoagulable work-up following her hospitalization for acute saddle PE  She was admitted to [facility] from 1/26-1/28 for acute saddle PE  She is status-post TPA and received heparin drip inpatient which was transitioned to Eliquis  She briefly required oxygen but was weaned off and discharged with an oxygen saturation of 96%  She was recommended by pulmonology to follow outpatient with us for a hypercoagulability work-up and continue Eliquis 10mg BID for 7 days, followed by 5mg BID for 6 months  China Tomlinson was diagnosed with COVID19 on 1/4/21 and presented to the [facility] ED on 1/10 with shortness of breath and productive cough for 2 days  She stated that she was placed on doxycycline, prednisone and albuterol inhaler five days ago prior after chest x-ray showed that she had pneumonia and Odilia felt worse since being on those medications  At that time, her lactic acid and d-dimer was positive but her CTA showed "patchy ground glass opacification in the left upper lobe and lingular region" and no evidence of PE  She presented to the ED on 1/15 with swelling and red streaking in the left upper extremity close to where her IV site was and was diagnosed with LUE superficial thrombophlebitis   She also reported pain in the bilateral ankles and feeling achy all over  Alice Catalan presented to the ED again on 1/26 with SOB and palpitations  She had a pulse ox of 88% on room air and was placed on 2 5 L nasal cannula with pulse ox improving to 95%  Her CT PE study showed massive PE with saddle embolus  She also had right heart strain demonstrated on ECHO with a mildly elevated troponin and increased D-dimer  She was given systemic tPA and a heparin drip was started on 1/27  She started her first day of loading dose (10mg BID) Eliquis on 1/28 and transitioned to 5mg Eliquis BID on Thursday, February 4th  She reports no complications  She has a history of provoked PE likely secondary to travel and OCP 17 years ago and was treated with 6 month Warfarin at the time  She has been taking 81 mg Aspirin daily as well as norethindrone (Micronor) 0 35 mg tablet, a progesterone only OCP, daily  She denies other episodes of blood clots  She is here by herself today  She voices no acute complaints  She notes negative thrombophilia workup in 2004 at [facility]  She is not able to provide her records  She denies history of miscarriages or history of heavy periods except for when she was on coumadin for 6 months  She did not experience trauma leading up to the PE  She notes that she has been working from home for a year  She notes being off work since December 28th and was being more active in comparison to when she was working  She returned back to work this week  She has no personal or family history of cancer  She denies family history of clotting or miscarriages     Today, she notes decreased stamina and "tiring easier " She checks her pulse ox at home and it has remained above 96% and her pulse is between 70-90 when she does have activity and always remains under 90 at rest    She denies fevers, chills, fatigue, lightheadedness/dizziness, chest pain, palpitations, SOB, cough, hemoptysis, abdominal distension/tenderness, nausea/vomiting, constipation/diarrhea, melena/hematochezia, hematuria, ecchymosis, petechiae/purpura, or LE tenderness, swelling, or erythema  She denies tobacco use  She drinks alcohol once a year on holidays  She works as a  at [employer]  I have reviewed the relevant past medical, surgical, social and family history  I have also reviewed allergies and medications for this patient  Review of Systems  Review of Systems   All other systems reviewed and are negative  OBJECTIVE     Physical Exam  Vitals:    02/11/21 0852   BP: 122/68   BP Location: Left arm   Patient Position: Sitting   Cuff Size: Large   Pulse: 71   Resp: 18   Temp: 98 8 °F (37 1 °C)   TempSrc: Tympanic   SpO2: 97%   Weight: 108 kg (237 lb)   Height: 5' 7" (1 702 m)       Physical Exam  Constitutional:       General: She is not in acute distress  Appearance: Normal appearance  She is not toxic-appearing  HENT:      Head: Normocephalic and atraumatic  Eyes:      Extraocular Movements: Extraocular movements intact  Conjunctiva/sclera: Conjunctivae normal       Pupils: Pupils are equal, round, and reactive to light  Neck:      Musculoskeletal: Normal range of motion and neck supple  Cardiovascular:      Rate and Rhythm: Normal rate and regular rhythm  Pulses: Normal pulses  Heart sounds: Normal heart sounds  Pulmonary:      Effort: Pulmonary effort is normal  No respiratory distress  Breath sounds: Normal breath sounds  No wheezing or rales  Abdominal:      General: Bowel sounds are normal  There is no distension  Palpations: Abdomen is soft  Tenderness: There is no abdominal tenderness  There is no guarding  Musculoskeletal: Normal range of motion  General: No swelling, tenderness or signs of injury  Right lower leg: No edema  Left lower leg: No edema  Skin:     General: Skin is warm and dry  Coloration: Skin is not pale  Findings: No erythema or rash  Comments: Well-healing ecchymosis at IV sites from recent hospitalization  No new ecchymosis, petechiae, or purpura  No streaking or palpable chord in b/l LE  Neurological:      General: No focal deficit present  Mental Status: She is alert and oriented to person, place, and time  Mental status is at baseline  Psychiatric:         Mood and Affect: Mood normal          Behavior: Behavior normal           Imaging  Relevant imaging reviewed in chart  No procedure found      Labs  Relevant labs reviewed in chart     Component      Latest Ref Rng & Units 1/27/2021 1/27/2021 1/27/2021 1/27/2021          12:38 AM  4:42 AM  8:10 AM  4:07 PM   FIBRINOGEN LEVEL      227 - 495 mg/dL <60 (L) <60 (L) <60 (L) 97 (L)       Component      Latest Ref Rng & Units 1/27/2021 1/27/2021 1/28/2021 1/28/2021           8:10 AM  4:07 PM  1:27 AM  6:21 AM   PTT      23 - 37 seconds 27 139 (HH) 78 (H) 85 (H)   **Note: Her heparin gtt was started on 1/27    Component      Latest Ref Rng & Units 1/10/2021 1/26/2021 1/27/2021   Protime      11 6 - 14 5 seconds 12 5 13 6 17 5 (H)   INR      0 84 - 1 19 0 95 1 06 1 44 (H)   **Note: Her heparin gtt was started on 1/27    Component      Latest Ref Rng & Units 1/15/2021 1/26/2021 1/27/2021 1/28/2021   WBC      4 31 - 10 16 Thousand/uL 9 26 10 82 (H) 8 63 6 75   Red Blood Cell Count      3 81 - 5 12 Million/uL 5 44 (H) 5 63 (H) 4 87 4 92   Hemoglobin      11 5 - 15 4 g/dL 15 9 (H) 16 5 (H) 14 4 14 5   HCT      34 8 - 46 1 % 47 0 (H) 48 7 (H) 42 2 43 1   MCV      82 - 98 fL 86 87 87 88   MCH      26 8 - 34 3 pg 29 2 29 3 29 6 29 5   MCHC      31 4 - 37 4 g/dL 33 8 33 9 34 1 33 6   RDW      11 6 - 15 1 % 13 1 13 2 13 3 13 6   MPV      8 9 - 12 7 fL 9 9 10 3 10 4 10 4   Platelet Count      092 - 390 Thousands/uL 259 214 168 156   nRBC      /100 WBCs 0 0 0 0   Neutrophils %      43 - 75 %  78 (H) 62 49   Immat GRANS %      0 - 2 %  1 1 1   Lymphocytes Relative      14 - 44 %  13 (L) 25 33 Monocytes Relative      4 - 12 %  6 9 9   Eosinophils      0 - 6 % 1 1 2 7 (H)   Basophils Relative      0 - 1 % 0 1 1 1   Absolute Neutrophils      1 85 - 7 62 Thousands/µL  8 55 (H) 5 43 3 29   Immature Grans Absolute      0 00 - 0 20 Thousand/uL  0 08 0 05 0 09   Lymphocytes Absolute      0 60 - 4 47 Thousands/µL  1 40 2 19 2 23   Absolute Monocytes      0 17 - 1 22 Thousand/µL  0 59 0 74 0 59   Absolute Eosinophils      0 00 - 0 61 Thousand/µL 0 09 0 14 0 17 0 49   Basophils Absolute      0 00 - 0 10 Thousands/µL 0 00 0 06 0 05 0 06   Segs Relative      43 - 75 % 74      Bands Relative      0 - 8 % 1      Lymphocytes %      14 - 44 % 15      Monocytes      4 - 12 % 4      Blasts Relative      <=0 % 1 (H)      Atypical Lymphocytes %      <=0 % 4 (H)      Abs Neutrophils      1 85 - 7 62 Thousand/uL 6 95      LYMPHOCYTES ABSOLUTE      0 60 - 4 47 Thousand/uL 1 39      Monocytes Absolute      0 00 - 1 22 Thousand/uL 0 37      Total Counted       100      RBC Morphology       Normal      Platelet Estimate      Adequate Adequate      Large Platelet       Present      Metamyelocytes%      0 - 1 %       CLUMPED PLATELETS                   ASSESSMENT & PLAN      Diagnosis ICD-10-CM Associated Orders   1  Acute saddle pulmonary embolism, unspecified whether acute cor pulmonale present (HCC)  I26 92 Factor 5 leiden     Prothrombin gene mutation   2  History of pulmonary embolus (PE)  Z86 711 Factor 5 leiden     Prothrombin gene mutation   3  Encounter for anticoagulation discussion and counseling  Z71 89 Factor 5 leiden     Prothrombin gene mutation     · I spoke with Dr Chanel Zendejas about her case  Given that her recent saddle PE was unprovoked and her existing history of PE, we recommend indefinite anticoagulation  I reviewed with her that because her massive VTE was unprovoked, the benefits of lifelong anticoagulation outweigh the risk of bleeding   Prior to starting on anticoagulation, she was not considered at high-risk for bleeding, minimizing her risk of adverse effects on AC  As Eliquis has worked for her in the past, we will continue with lifelong Eliquis 5mg BID  We recommended that she discontinue her ASA as she will be sufficiently prophylaxed with monotherapy Eliquis  I have agreed to manage her anticoagulation and will see her in 1 month to discuss the results of her hypercoagulable work up  I have sent in an additional month of refills to her pharmacy  I will see her every 6 months thereafter  · I have reviewed the purpose and side effect profile of lifelong anticoagulation with Eliquis  I answered all her questions and she voiced understanding  Follow Up   In 1 month to discuss lab work results and discuss plan moving forward  All questions were answered to the patient's satisfaction during this encounter  They appreciated and thanked me for spending time with them  The patient knows the contact information for our office and know to reach out for any relevant concerns related to this encounter  For all other listed problems and medical diagnosis in his chart - they are managed by PCP and/or other specialists, which patient acknowledges          Sanjay Johnson PA-C  Hematology & Medical Oncology

## 2021-02-11 ENCOUNTER — CONSULT (OUTPATIENT)
Dept: HEMATOLOGY ONCOLOGY | Facility: CLINIC | Age: 46
End: 2021-02-11
Payer: COMMERCIAL

## 2021-02-11 VITALS
BODY MASS INDEX: 37.2 KG/M2 | HEIGHT: 67 IN | RESPIRATION RATE: 18 BRPM | OXYGEN SATURATION: 97 % | TEMPERATURE: 98.8 F | SYSTOLIC BLOOD PRESSURE: 122 MMHG | HEART RATE: 71 BPM | WEIGHT: 237 LBS | DIASTOLIC BLOOD PRESSURE: 68 MMHG

## 2021-02-11 DIAGNOSIS — Z86.711 HISTORY OF PULMONARY EMBOLUS (PE): ICD-10-CM

## 2021-02-11 DIAGNOSIS — I26.92 ACUTE SADDLE PULMONARY EMBOLISM, UNSPECIFIED WHETHER ACUTE COR PULMONALE PRESENT (HCC): Primary | ICD-10-CM

## 2021-02-11 DIAGNOSIS — Z71.89 ENCOUNTER FOR ANTICOAGULATION DISCUSSION AND COUNSELING: ICD-10-CM

## 2021-02-11 PROCEDURE — 99244 OFF/OP CNSLTJ NEW/EST MOD 40: CPT | Performed by: STUDENT IN AN ORGANIZED HEALTH CARE EDUCATION/TRAINING PROGRAM

## 2021-02-11 NOTE — PATIENT INSTRUCTIONS
Apixaban (By mouth)   Apixaban (a-PIX-a-ban)  Treats and prevents blood clots  This medicine is a blood thinner  Brand Name(s): Eliquis Eliqulou 30 Day DVT/PE Starter Pack   There may be other brand names for this medicine  When This Medicine Should Not Be Used: This medicine is not right for everyone  Do not use it if you had an allergic reaction to apixaban or you have active bleeding  How to Use This Medicine:   Tablet  · Your doctor will tell you how much medicine to use  Do not use more than directed  · If you are not able to swallow the tablets whole, they may be crushed and mixed in water, 5% dextrose in water (D5W), apple juice, or applesauce  The crushed tablets may be mixed with 60 mL of water or D5W dose and given through a nasogastric tube (NGT)  · This medicine should come with a Medication Guide  Ask your pharmacist for a copy if you do not have one  · Missed dose: Take a dose as soon as you remember  If it is almost time for your next dose, wait until then and take a regular dose  Do not take extra medicine to make up for a missed dose  · Store the medicine in a closed container at room temperature, away from heat, moisture, and direct light  Drugs and Foods to Avoid:   Ask your doctor or pharmacist before using any other medicine, including over-the-counter medicines, vitamins, and herbal products  · Some medicines can affect how apixaban works  Tell your doctor if you are using any of the following:   ? Carbamazepine, itraconazole, ketoconazole, phenytoin, rifampin, ritonavir, Brian's wort  ? Blood thinner (including clopidogrel, heparin, prasugrel, warfarin)  ? Medicine to treat depression  ?  NSAID pain or arthritis medicine (including aspirin, celecoxib, diclofenac, ibuprofen, naproxen)  Warnings While Using This Medicine:   · Tell your doctor if you are pregnant or breastfeeding, or if you have kidney disease, liver disease, bleeding problems, antiphospholipid syndrome, or an artificial heart valve  · Do not stop using this medicine suddenly without asking your doctor  You might have a higher risk of stroke for a short time after you stop using this medicine  · This medicine increases your risk for bleeding that can become serious if not controlled  You may also bruise easily, and it may take longer than usual for bleeding to stop  · This medicine may increase your risk for a hematoma (blood clot) in your spine or back if you undergo an epidural or spinal puncture  This could lead to paralysis  Tell your doctor if you ever had spine problems or back surgery  · Tell any doctor or dentist who treats you that you are using this medicine  With your doctor's supervision, you may need to stop using this medicine several days before you have surgery or medical tests  · Your doctor will do lab tests at regular visits to check on the effects of this medicine  Keep all appointments  · Keep all medicine out of the reach of children  Never share your medicine with anyone  Possible Side Effects While Using This Medicine:   Call your doctor right away if you notice any of these side effects:  · Allergic reaction: Itching or hives, swelling in your face or hands, swelling or tingling in your mouth or throat, chest tightness, trouble breathing  · Change in how much or how often you urinate, red or pink urine  · Chest pain, trouble breathing  · Coughing up blood, vomiting blood or material that looks like coffee grounds  · Numbness, tingling, or muscle weakness in your legs or feet  · Red or black, tarry stools  · Unusual bleeding, bruising, or weakness  If you notice other side effects that you think are caused by this medicine, tell your doctor  Call your doctor for medical advice about side effects   You may report side effects to FDA at 8-122-FDA-0422  © Copyright [address] [place] [address] Drive Information is for End User's use only and may not be sold, redistributed or otherwise used for commercial purposes  The above information is an  only  It is not intended as medical advice for individual conditions or treatments  Talk to your doctor, nurse or pharmacist before following any medical regimen to see if it is safe and effective for you

## 2021-02-22 ENCOUNTER — TELEPHONE (OUTPATIENT)
Dept: HEMATOLOGY ONCOLOGY | Facility: CLINIC | Age: 46
End: 2021-02-22

## 2021-02-22 NOTE — TELEPHONE ENCOUNTER
Pharmacy:  [id number]   Phone:  [phone]     Patient called reporting that her pharmacy did not receive her Eliquis prescription  Verbal order called to above pharmacy  apixaban (ELIQUIS) 5 mg [de-identified]     Order Details  Dose: 5 mg Route: Oral Frequency: 2 times daily   Dispense Quantity: 60 each Refills: 0 Fills remaining: --           Sig: Take 1 tablet (5 mg total) by mouth 2 (two) times a day               Patient advised of above  Patient verbalized understanding of above

## 2021-03-08 ENCOUNTER — TELEPHONE (OUTPATIENT)
Dept: HEMATOLOGY ONCOLOGY | Facility: CLINIC | Age: 46
End: 2021-03-08

## 2021-03-08 ENCOUNTER — TRANSCRIBE ORDERS (OUTPATIENT)
Dept: ADMINISTRATIVE | Facility: HOSPITAL | Age: 46
End: 2021-03-08

## 2021-03-08 NOTE — TELEPHONE ENCOUNTER
Patient is calling in requesting for her progress note to be faxed over to her health insurance since they need more information on the labs that have been requested   Please fax the progress note to [phone]    Patient can be reached back at [phone]

## 2021-03-10 NOTE — PROGRESS NOTES
[address] [facility] - Hematology & Medical Oncology  Outpatient Visit Encounter Note      Paco Sampson 50 y o  female 1975 [id number] Date:  3/15/2021    HEMATOLOGICAL HISTORY        Clotting History 1  Provoked Pulmonary Embolism (Dx: 2004) - Rx: 6 mo warfarin then daily 81mg ASA for prophylaxis  - secondary to travel and OCP use  2  Acute Saddle Pulmonary Embolism (Dx: 1/26/2021) - Rx: systemic t-PA and heparin gtt, transitioned to Eliquis 10mg BID for 7 days, followed by 5mg BID for 6 months   Bleeding History Denies   Cancer History Denies   Family Cancer History Maternal grandmother with lung cancer   H/O Blood/Plt Transfusion Denies   Tobacco Use Denies   Alcohol Use Denies   Occupation  at [employer] is a 75-year-old female with recent COVID infection, history of provoked PE, Hashimoto thyroiditis, and pituitary mass who is seeing me for follow up of her hypercoagulable work-up following her hospitalization for acute saddle PE  She was admitted to [facility] from 1/26-1/28 for acute saddle PE  She is status-post TPA and received heparin drip inpatient which was transitioned to Eliquis  She briefly required oxygen but was weaned off and discharged with an oxygen saturation of 96%  She tested positive for COVID on 1/4/21  She was recommended by pulmonology to follow outpatient with us for a hypercoagulability work-up and continue Eliquis 10mg BID for 7 days, followed by 5mg BID for 6 months  Odilia's [facility] results in 2009 demonstrated negative prothrombin gene mutation Factor V Leiden, as well as normal protein C, S, and antithrombin III activity  She has a history of provoked PE likely secondary to travel and OCP use 17 years ago and was treated with 6 month Warfarin at the time  She had been taking 81 mg Aspirin daily as well as norethindrone (Micronor) 0 35 mg tablet, a progesterone-only OCP, daily       Given that her recent extensive saddle PE may have been unprovoked and her existing history of PE, we recommended indefinite anticoagulation with lifelong Eliquis 5mg BID during her last visit  We recommended that she discontinue her ASA as she will be sufficiently prophylaxed with monotherapy Eliquis  She is here by herself today  She voices no acute complaints  She notes increased dryness of the scalp associated with occasional pruritis  She denies any history of season allergies or changes in shampoo's or conditioners  She will be following up with her Dermatologist regarding this  She tells me that she is tolerating the Eliquis well and notes that she feels safer with it because of how frequently she travels  She denies any lightheadedness/dizziness, acute weakness/paralysis, increased epistaxis, hemoptysis, melena/hematochezia, hematuria, menorrhagia, unexplained ecchymosis, or petechiae/purpura  However, she did note slightly increased blood loss on the first day of her menstrual period this month  She denies fevers, chills, unintentional weight loss, night sweats, fatigue, headaches, cough, SOB, palpitations, chest pain, abdominal tenderness/distension, nausea/vomiting, constipation/diarrhea, or LE swelling, erythema, or tenderness  I have reviewed the relevant past medical, surgical, social and family history  I have also reviewed allergies and medications for this patient  Review of Systems  Review of Systems   All other systems reviewed and are negative  OBJECTIVE     Physical Exam  Vitals:    03/15/21 0812   BP: 130/78   BP Location: Left arm   Patient Position: Sitting   Cuff Size: Large   Pulse: 77   Resp: 18   Temp: 98 6 °F (37 °C)   TempSrc: Tympanic   SpO2: 99%   Weight: 108 kg (237 lb 6 4 oz)   Height: 5' 7" (1 702 m)       Physical Exam  Vitals signs reviewed  Constitutional:       General: She is not in acute distress  Appearance: Normal appearance  She is normal weight   She is not ill-appearing or toxic-appearing  HENT:      Head: Normocephalic and atraumatic  Eyes:      Extraocular Movements: Extraocular movements intact  Conjunctiva/sclera: Conjunctivae normal       Pupils: Pupils are equal, round, and reactive to light  Comments: No conjunctival pallor  Neck:      Musculoskeletal: Normal range of motion and neck supple  No neck rigidity  Cardiovascular:      Rate and Rhythm: Normal rate and regular rhythm  Pulses: Normal pulses  Heart sounds: Normal heart sounds  No murmur  No friction rub  No gallop  Pulmonary:      Effort: Pulmonary effort is normal  No respiratory distress  Breath sounds: Normal breath sounds  No wheezing or rales  Abdominal:      General: Bowel sounds are normal  There is no distension  Palpations: Abdomen is soft  There is no mass  Tenderness: There is no abdominal tenderness  There is no guarding  Musculoskeletal: Normal range of motion  General: No swelling or tenderness  Right lower leg: No edema  Left lower leg: No edema  Comments: Compression stockings on bilateral legs in place  Lymphadenopathy:      Cervical: No cervical adenopathy  Skin:     General: Skin is warm and dry  Coloration: Skin is not jaundiced or pale  Findings: No bruising, erythema or rash  Comments: 2x3cm patches of dry skin with dandruff resembling atopic dermatitis on the scalp  Neurological:      General: No focal deficit present  Mental Status: She is alert and oriented to person, place, and time  Mental status is at baseline  Psychiatric:         Mood and Affect: Mood normal          Behavior: Behavior normal           Imaging  Relevant imaging reviewed in chart    Labs  Relevant labs reviewed in chart     ASSESSMENT & PLAN      Diagnosis ICD-10-CM Associated Orders   1  Encounter for anticoagulation discussion and counseling  Z71 89    2   Personal history of PE (pulmonary embolism) X26 [id number]      Encounter for anticoagulation discussion and counseling  Sigrid Lynn is tolerating her indefinite anticoagulation with Eliquis 5mg BID well for likely unprovoked, extensive saddle PE in January 2021  I extensively reviewed her negative hypercoagulable work-up in 2009 with her and she voiced understanding  I reviewed the red flags for acute bleeding to report to the ER for, which include but are not limited to sudden onset lightheadedness/dizziness, acute weakness/paralysis, hemoptysis, melena/hematochezia, hematuria, or menorrhagia   I also reviewed that she no longer has an indication to follow-up with us because her PCP can safely manage her indefinite anticoagulation  I explained that she is welcome to reach out to our office if she has any questions or set up an appointment if needed  I answered all her questions and she voiced understanding   I provided sufficient Eliquis samples in the office  Personal history of PE   She will be receiving her follow-up ECHO on March 29th and will be following up with her PCP Dr Minna Merritt on March 31st     Scalp Dryness  · I reviewed that this is likely not due to her Eliquis use  I recommended that she follow up with her dermatologist regarding this  Follow Up   None needed  All questions were answered to the patient's satisfaction during this encounter  They appreciated and thanked me for spending time with them  The patient knows the contact information for our office and know to reach out for any relevant concerns related to this encounter  For all other listed problems and medical diagnosis in his chart - they are managed by PCP and/or other specialists, which patient acknowledges        Wilma Mina PA-C  Hematology & Medical Oncology

## 2021-03-12 ENCOUNTER — TELEPHONE (OUTPATIENT)
Dept: HEMATOLOGY ONCOLOGY | Facility: CLINIC | Age: 46
End: 2021-03-12

## 2021-03-12 NOTE — TELEPHONE ENCOUNTER
Call from [address] [facility] [address] [phone]  Ext [phone]  Prothrombin factor 2 gene testing was denied due to being investigational   Provider does have an option to do an appeal  Will pass on to care team and oncology finance

## 2021-03-15 ENCOUNTER — OFFICE VISIT (OUTPATIENT)
Dept: HEMATOLOGY ONCOLOGY | Facility: CLINIC | Age: 46
End: 2021-03-15
Payer: COMMERCIAL

## 2021-03-15 VITALS
HEART RATE: 77 BPM | BODY MASS INDEX: 37.26 KG/M2 | HEIGHT: 67 IN | RESPIRATION RATE: 18 BRPM | SYSTOLIC BLOOD PRESSURE: 130 MMHG | WEIGHT: 237.4 LBS | DIASTOLIC BLOOD PRESSURE: 78 MMHG | OXYGEN SATURATION: 99 % | TEMPERATURE: 98.6 F

## 2021-03-15 DIAGNOSIS — Z71.89 ENCOUNTER FOR ANTICOAGULATION DISCUSSION AND COUNSELING: Primary | ICD-10-CM

## 2021-03-15 DIAGNOSIS — Z86.711 PERSONAL HISTORY OF PE (PULMONARY EMBOLISM): ICD-10-CM

## 2021-03-15 PROCEDURE — 99214 OFFICE O/P EST MOD 30 MIN: CPT | Performed by: STUDENT IN AN ORGANIZED HEALTH CARE EDUCATION/TRAINING PROGRAM

## 2021-03-15 NOTE — PATIENT INSTRUCTIONS
Apixaban (By mouth)   Apixaban (a-PIX-a-ban)  Treats and prevents blood clots  This medicine is a blood thinner  Brand Name(s): Eliquis Eliqulou 30 Day DVT/PE Starter Pack   There may be other brand names for this medicine  When This Medicine Should Not Be Used: This medicine is not right for everyone  Do not use it if you had an allergic reaction to apixaban or you have active bleeding  How to Use This Medicine:   Tablet  · Your doctor will tell you how much medicine to use  Do not use more than directed  · If you are not able to swallow the tablets whole, they may be crushed and mixed in water, 5% dextrose in water (D5W), apple juice, or applesauce  The crushed tablets may be mixed with 60 mL of water or D5W dose and given through a nasogastric tube (NGT)  · This medicine should come with a Medication Guide  Ask your pharmacist for a copy if you do not have one  · Missed dose: Take a dose as soon as you remember  If it is almost time for your next dose, wait until then and take a regular dose  Do not take extra medicine to make up for a missed dose  · Store the medicine in a closed container at room temperature, away from heat, moisture, and direct light  Drugs and Foods to Avoid:   Ask your doctor or pharmacist before using any other medicine, including over-the-counter medicines, vitamins, and herbal products  · Some medicines can affect how apixaban works  Tell your doctor if you are using any of the following:   ? Carbamazepine, itraconazole, ketoconazole, phenytoin, rifampin, ritonavir, Brian's wort  ? Blood thinner (including clopidogrel, heparin, prasugrel, warfarin)  ? Medicine to treat depression  ?  NSAID pain or arthritis medicine (including aspirin, celecoxib, diclofenac, ibuprofen, naproxen)  Warnings While Using This Medicine:   · Tell your doctor if you are pregnant or breastfeeding, or if you have kidney disease, liver disease, bleeding problems, antiphospholipid syndrome, or an artificial heart valve  · Do not stop using this medicine suddenly without asking your doctor  You might have a higher risk of stroke for a short time after you stop using this medicine  · This medicine increases your risk for bleeding that can become serious if not controlled  You may also bruise easily, and it may take longer than usual for bleeding to stop  · This medicine may increase your risk for a hematoma (blood clot) in your spine or back if you undergo an epidural or spinal puncture  This could lead to paralysis  Tell your doctor if you ever had spine problems or back surgery  · Tell any doctor or dentist who treats you that you are using this medicine  With your doctor's supervision, you may need to stop using this medicine several days before you have surgery or medical tests  · Your doctor will do lab tests at regular visits to check on the effects of this medicine  Keep all appointments  · Keep all medicine out of the reach of children  Never share your medicine with anyone  Possible Side Effects While Using This Medicine:   Call your doctor right away if you notice any of these side effects:  · Allergic reaction: Itching or hives, swelling in your face or hands, swelling or tingling in your mouth or throat, chest tightness, trouble breathing  · Change in how much or how often you urinate, red or pink urine  · Chest pain, trouble breathing  · Coughing up blood, vomiting blood or material that looks like coffee grounds  · Numbness, tingling, or muscle weakness in your legs or feet  · Red or black, tarry stools  · Unusual bleeding, bruising, or weakness  If you notice other side effects that you think are caused by this medicine, tell your doctor  Call your doctor for medical advice about side effects   You may report side effects to FDA at 4-203-FDA-2984  © Copyright [address] [place] [address] Drive Information is for End User's use only and may not be sold, redistributed or otherwise used for commercial purposes  The above information is an  only  It is not intended as medical advice for individual conditions or treatments  Talk to your doctor, nurse or pharmacist before following any medical regimen to see if it is safe and effective for you

## 2021-09-08 ENCOUNTER — EVALUATION (OUTPATIENT)
Dept: PHYSICAL THERAPY | Facility: CLINIC | Age: 46
End: 2021-09-08
Payer: COMMERCIAL

## 2021-09-08 DIAGNOSIS — M22.8X1 PATELLAR TRACKING DISORDER OF RIGHT KNEE: ICD-10-CM

## 2021-09-08 DIAGNOSIS — M25.561 PATELLOFEMORAL JOINT PAIN, RIGHT: Primary | ICD-10-CM

## 2021-09-08 DIAGNOSIS — M76.891 TENDINITIS OF RIGHT QUADRICEPS TENDON: ICD-10-CM

## 2021-09-08 PROCEDURE — 97162 PT EVAL MOD COMPLEX 30 MIN: CPT | Performed by: PHYSICAL THERAPIST

## 2021-09-08 PROCEDURE — 97110 THERAPEUTIC EXERCISES: CPT | Performed by: PHYSICAL THERAPIST

## 2021-09-08 NOTE — LETTER
2021    Blake Salmon MD  [address]    Patient: Mary Manrique   YOB: 1975   Date of Visit: 2021     Encounter Diagnosis     ICD-10-CM    1  Patellofemoral joint pain, right  M25 561    2  Patellar tracking disorder of right knee  M22 8X1    3  Tendinitis of right quadriceps tendon  M76 891        Dear Dr Nicole Bella: Thank you for your recent referral of Mary Manrique  Please review the attached evaluation summary from Odilia's recent visit  Please verify that you agree with the plan of care by signing the attached order  If you have any questions or concerns, please do not hesitate to call  I sincerely appreciate the opportunity to share in the care of one of your patients and hope to have another opportunity to work with you in the near future  Sincerely,    Kan Gimenez, PT      Referring Provider:      I certify that I have read the below Plan of Care and certify the need for these services furnished under this plan of treatment while under my care  Blake Salmon MD  [address] [place] [address]  Via Fax: [phone]          PT Evaluation     Today's date: 2021  Patient name: Mary Manrique  : 1975  MRN: [id number]  Referring provider: Mary Villaseñor MD  Dx:   Encounter Diagnosis     ICD-10-CM    1  Patellofemoral joint pain, right  M25 561    2  Patellar tracking disorder of right knee  M22 8X1    3  Tendinitis of right quadriceps tendon  M76 891                   Assessment  Assessment details: Mary Manrique is a pleasant 48 y o  female presenting to PT with cc of R knee pain and clicking for past 6 months  Pt  States pain began when twisting leg in kitchen one day and steadily feeling a worsening soreness at superolateral knee   Upon examination, patient was found to have objective deficits as listed below and is displaying ss consistent with patellofemoral dysfunction with tight lateral structures and irritated quad insertion  Pt  Is experiencing subsequent functional deficits including difficulty standing up from sitting and navigating stairs    Pt  Was educated on role of PT to address issues and given initial HEP  Pt  Would benefit from skilled physical therapy to promote improved function and maximize activity tolerance  Symptom irritability: highUnderstanding of Dx/Px/POC: excellent  Goals  ST weeks  -Pt  Will demonstrate R knee SLR strength 5/5  -Pt  Will demonstrate good technique with functional squatting    LT weeks  -Pt  Will demonstrate ability to ambulate 1 mile without pain  -Pt  Will demonstrate R knee strength WNL   Pt   Will demonstrate R glute med isolated strength WNL    Plan  Patient would benefit from: skilled physical therapy  Planned modality interventions: cryotherapy, high voltage pulsed current: spasm management, high voltage pulsed current: pain management, unattended electrical stimulation and thermotherapy: hydrocollator packs  Planned therapy interventions: abdominal trunk stabilization, balance, balance/weight bearing training, body mechanics training, fine motor coordination training, flexibility, functional ROM exercises, gait training, graded exercise, graded motor, home exercise program, therapeutic training, therapeutic activities, stretching, therapeutic exercise, strengthening, postural training, patient education, neuromuscular re-education, motor coordination training, Solitario taping, massage, manual therapy and joint mobilization  Frequency: 2x week  Duration in weeks: 6  Plan of Care beginning date: 2021  Plan of Care expiration date: 10/20/2021  Treatment plan discussed with: patient        Subjective Evaluation    History of Present Illness  Mechanism of injury: Milly Perez is a pleasant 48 y o  who presents with complains of R knee pain that started 5-6 months ago when she took a weird step and had a tweak in her R knee  Since then, she has had pain in her right knee that consists of clicking, popping and a dull ache localized superolateral to her patella  In an attempt to treat this pain, she received a steroid injection which resulted in minimal relief of her pain  The symptoms are aggravated only when transitioning from sit to stand and ascending/descending steps  X-rays of her R knee were negative  She denies any numbness or tingling down her legs  She denies any changes in bowl or bladder, fevers or night chills  Not a recurrent problem   Quality of life: excellent    Pain  Current pain ratin  At best pain ratin  At worst pain ratin (R knee)  Location: Superolateral patella   Quality: dull ache, sharp, pulling, discomfort and grinding  Relieving factors: rest  Aggravating factors: stair climbing (transitioning from sitting to standing)  Progression: no change    Social Support  Lives with: spouse      Diagnostic Tests  X-ray: normal  Treatments  Previous treatment: injection treatment (R knee)  Current treatment: physical therapy  Patient Goals  Patient goals for therapy: decreased edema, decreased pain and independence with ADLs/IADLs  Patient goal: Patient reports she would like to decrease pain in order to return to daily activities such as getting up from a chair and climbing steps  Objective     Tenderness     Right Knee   Tenderness in the ITB, lateral patella, lateral retinaculum and superior patella  Active Range of Motion     Right Knee   Normal active range of motion  Flexion: WFL  Extension: [facility]    Mobility   Patellar Mobility:     Right Knee   WFL: medial, lateral, superior and inferior  Tibiofemoral Mobility:   Right knee Tibiofemoral tendons within functional limits include the anterior       Strength/Myotome Testing     Right Knee   Flexion: 5  Extension: 5    Right Ankle/Foot   Dorsiflexion: 5  Plantar flexion: 5    Tests     Right Knee   Negative active quad, Apley's compression, lateral Jamal, patellar compression, varus stress test at 0 degrees and varus stress test at 30 degrees  Swelling     Right Knee Girth Measurement (cm)   Joint line:  5 cm greater compared to L knee joint line               Precautions: Blood thinners     Daily Treatment Diary:      Initial Evaluation Date: 09/08/21  Compliance 9/8                     Visit Number 1                    Re-Eval  IE                 [address]   Foto Captured Y                           9/8                     Manual                      ITB rolling -                                                                 Ther-Ex                      TFL stretch c strap 30"x4                     Prone quad stretch 30"x4                     Quad sets 5"x20                                                                                                                                                         Neuro Re-Ed                      Sit to stands  YTB around knees 2x10                                                                         Ther-Act                                                               Modalities

## 2021-09-08 NOTE — PROGRESS NOTES
PT Evaluation     Today's date: 2021  Patient name: Qian Strong  : 1975  MRN: [id number]  Referring provider: Magdalena Carrasco MD  Dx:   Encounter Diagnosis     ICD-10-CM    1  Patellofemoral joint pain, right  M25 561    2  Patellar tracking disorder of right knee  M22 8X1    3  Tendinitis of right quadriceps tendon  M76 891                   Assessment  Assessment details: Qian Strong is a pleasant 48 y o  female presenting to PT with cc of R knee pain and clicking for past 6 months  Pt  States pain began when twisting leg in kitchen one day and steadily feeling a worsening soreness at superolateral knee  Upon examination, patient was found to have objective deficits as listed below and is displaying ss consistent with patellofemoral dysfunction with tight lateral structures and irritated quad insertion  Pt  Is experiencing subsequent functional deficits including difficulty standing up from sitting and navigating stairs    Pt  Was educated on role of PT to address issues and given initial HEP  Pt  Would benefit from skilled physical therapy to promote improved function and maximize activity tolerance  Symptom irritability: highUnderstanding of Dx/Px/POC: excellent  Goals  ST weeks  -Pt  Will demonstrate R knee SLR strength 5/5  -Pt  Will demonstrate good technique with functional squatting    LT weeks  -Pt  Will demonstrate ability to ambulate 1 mile without pain  -Pt  Will demonstrate R knee strength WNL   Pt   Will demonstrate R glute med isolated strength WNL    Plan  Patient would benefit from: skilled physical therapy  Planned modality interventions: cryotherapy, high voltage pulsed current: spasm management, high voltage pulsed current: pain management, unattended electrical stimulation and thermotherapy: hydrocollator packs  Planned therapy interventions: abdominal trunk stabilization, balance, balance/weight bearing training, body mechanics training, fine motor coordination training, flexibility, functional ROM exercises, gait training, graded exercise, graded motor, home exercise program, therapeutic training, therapeutic activities, stretching, therapeutic exercise, strengthening, postural training, patient education, neuromuscular re-education, motor coordination training, Solitario taping, massage, manual therapy and joint mobilization  Frequency: 2x week  Duration in weeks: 6  Plan of Care beginning date: 2021  Plan of Care expiration date: 10/20/2021  Treatment plan discussed with: patient        Subjective Evaluation    History of Present Illness  Mechanism of injury: Jani Keller is a pleasant 48 y o  who presents with complains of R knee pain that started 5-6 months ago when she took a weird step and had a tweak in her R knee  Since then, she has had pain in her right knee that consists of clicking, popping and a dull ache localized superolateral to her patella  In an attempt to treat this pain, she received a steroid injection which resulted in minimal relief of her pain  The symptoms are aggravated only when transitioning from sit to stand and ascending/descending steps  X-rays of her R knee were negative  She denies any numbness or tingling down her legs  She denies any changes in bowl or bladder, fevers or night chills               Not a recurrent problem   Quality of life: excellent    Pain  Current pain ratin  At best pain ratin  At worst pain ratin (R knee)  Location: Superolateral patella   Quality: dull ache, sharp, pulling, discomfort and grinding  Relieving factors: rest  Aggravating factors: stair climbing (transitioning from sitting to standing)  Progression: no change    Social Support  Lives with: spouse      Diagnostic Tests  X-ray: normal  Treatments  Previous treatment: injection treatment (R knee)  Current treatment: physical therapy  Patient Goals  Patient goals for therapy: decreased edema, decreased pain and independence with ADLs/IADLs  Patient goal: Patient reports she would like to decrease pain in order to return to daily activities such as getting up from a chair and climbing steps  Objective     Tenderness     Right Knee   Tenderness in the ITB, lateral patella, lateral retinaculum and superior patella  Active Range of Motion     Right Knee   Normal active range of motion  Flexion: WFL  Extension: [facility]    Mobility   Patellar Mobility:     Right Knee   WFL: medial, lateral, superior and inferior  Tibiofemoral Mobility:   Right knee Tibiofemoral tendons within functional limits include the anterior  Strength/Myotome Testing     Right Knee   Flexion: 5  Extension: 5    Right Ankle/Foot   Dorsiflexion: 5  Plantar flexion: 5    Tests     Right Knee   Negative active quad, Apley's compression, lateral Jamal, patellar compression, varus stress test at 0 degrees and varus stress test at 30 degrees  Swelling     Right Knee Girth Measurement (cm)   Joint line:  5 cm greater compared to L knee joint line               Precautions: Blood thinners     Daily Treatment Diary:      Initial Evaluation Date: 09/08/21  Compliance 9/8                     Visit Number 1                    Re-Eval  IE                 [facility]   Foto Captured Y                           9/8                     Manual                      ITB rolling -                                                                 Ther-Ex                      TFL stretch c strap 30"x4                     Prone quad stretch 30"x4                     Quad sets 5"x20                                                                                                                                                         Neuro Re-Ed                      Sit to stands  YTB around knees 2x10                                                                         Ther-Act                                                               Modalities

## 2021-09-09 ENCOUNTER — OFFICE VISIT (OUTPATIENT)
Dept: PHYSICAL THERAPY | Facility: CLINIC | Age: 46
End: 2021-09-09
Payer: COMMERCIAL

## 2021-09-09 DIAGNOSIS — M25.561 PATELLOFEMORAL JOINT PAIN, RIGHT: Primary | ICD-10-CM

## 2021-09-09 DIAGNOSIS — M22.8X1 PATELLAR TRACKING DISORDER OF RIGHT KNEE: ICD-10-CM

## 2021-09-09 DIAGNOSIS — M76.891 TENDINITIS OF RIGHT QUADRICEPS TENDON: ICD-10-CM

## 2021-09-09 PROCEDURE — 97140 MANUAL THERAPY 1/> REGIONS: CPT | Performed by: PHYSICAL THERAPIST

## 2021-09-09 PROCEDURE — 97110 THERAPEUTIC EXERCISES: CPT | Performed by: PHYSICAL THERAPIST

## 2021-09-09 NOTE — PROGRESS NOTES
Daily Note     Today's date: 2021  Patient name: Carmella Ramos  : 1975  MRN: [id number]  Referring provider: Stefano Payne MD  Dx:   Encounter Diagnosis     ICD-10-CM    1  Patellofemoral joint pain, right  M25 561    2  Patellar tracking disorder of right knee  M22 8X1    3  Tendinitis of right quadriceps tendon  M76 891                   Subjective: Patient reports little change in symptoms since yesterdays evaluation  She did state it felt slightly better right after therapy but after sitting for a while at home it started to get stiff  Objective: See treatment diary below    Pt  Was seen by ROMINA Thomas under the direct supervision of Raya Cota PT, DPT  Assessment: Patient tolerated the first session following her initial evaluation well  She was progressed with standing exercises focused on VMO strength and proper patellar tracking   exercises were incorporated with moderate cueing for proper technique  Patient reported minimal symptoms while performing lateral step downs  Odilia responded well to ITB rolling and IASTM to R knee retinaculum for fascial mobility  Patient shows good motivation to reach goals and would benefit from continued skilled therapy to address deficits and improve function  Plan: Continue per plan of care  Progress treatment as tolerated         Precautions: Blood thinners     Daily Treatment Diary:      Initial Evaluation Date: 21  Compliance                    Visit Number 1 2                   Re-Eval  IE -                MC   Foto Captured Y -                                             Manual                      ITB rolling + retinaculum IASTM -  15'                                                               Ther-Ex                      TFL stretch c strap 30"x4  30"x4                   Prone quad stretch 30"x4  30"x4                   Quad sets 5"x20 5"x20           Prone quad sets -  5"x10 SLR 4 way -  2x10ea                   SAQs - -           Supine Hip ADD -  10x10"                   Standing TKE with T-band -  5"x20 YTB                   Lateral step down -  20x                                         [facility]  10'                   Neuro Re-Ed                      Sit to stands  YTB around knees 2x10  YTB 3x10                                                                       Ther-Act                                                               Modalities                      CP  15'

## 2021-09-13 ENCOUNTER — OFFICE VISIT (OUTPATIENT)
Dept: PHYSICAL THERAPY | Facility: CLINIC | Age: 46
End: 2021-09-13
Payer: COMMERCIAL

## 2021-09-13 DIAGNOSIS — M22.8X1 PATELLAR TRACKING DISORDER OF RIGHT KNEE: ICD-10-CM

## 2021-09-13 DIAGNOSIS — M25.561 PATELLOFEMORAL JOINT PAIN, RIGHT: Primary | ICD-10-CM

## 2021-09-13 DIAGNOSIS — M76.891 TENDINITIS OF RIGHT QUADRICEPS TENDON: ICD-10-CM

## 2021-09-13 PROCEDURE — 97110 THERAPEUTIC EXERCISES: CPT | Performed by: PHYSICAL THERAPIST

## 2021-09-13 PROCEDURE — 97140 MANUAL THERAPY 1/> REGIONS: CPT | Performed by: PHYSICAL THERAPIST

## 2021-09-13 NOTE — PROGRESS NOTES
Daily Note     Today's date: 2021  Patient name: tSevo Mims  : 1975  MRN: [id number]  Referring provider: Navdeep Dolan MD  Dx:   Encounter Diagnosis     ICD-10-CM    1  Patellofemoral joint pain, right  M25 561    2  Patellar tracking disorder of right knee  M22 8X1    3  Tendinitis of right quadriceps tendon  M76 891                   Subjective: Hilaria Bolaños reports her knee felt sore after doing her exercises at home over the weekend  Icing helped to relieve some irritation  Objective: See treatment diary below    Pt  Was seen by ROMINA Grier under the direct supervision of Cally Redmond PT, DPT  Assessment: Odilia tolerated today's therapy session well  She is improving with her body mechanics during step downs and sit to stands  She is now able to recognize proper technique during functional activities, requiring minimal verbal cues throughout session  Additional exercises focus on strengthening of the quadriceps muscles to promote proper patellar tracking  Patient continues to experience irritability at the R knee with functional activities, likely due to the chronicity of the pain  She is compliant with her HEP  Patient shows good motivation towards goals and would benefit from continued therapy services to address deficits and improve function  Plan: Continue per plan of care  Progress treatment as tolerated         Precautions: Blood thinners     Daily Treatment Diary:      Initial Evaluation Date: 21  Compliance                  Visit Number 1 2  3                 Re-Eval  IE -  -              MC   Foto Captured Y -  -                                         Manual                      ITB rolling + retinaculum IASTM -  15'  15'                                                             Ther-Ex                      TFL stretch c strap 30"x4  30"x4  30x4"                 Prone quad stretch 30"x4  30"x4  30"x4                 Quad sets 5"x20 5"x20  5"x30          Prone quad sets -  5"x10  5"x20                 SLR 4 way -  2x10ea  2x10ea                 SAQs - - -          Supine Hip ADD -  10x10"  10x10"                 Standing TKE with T-band -  5"x20 YTB  5" x20 YTB                 Lateral step down -  20x  20x                                       [facility]  10'  10'                 Neuro Re-Ed                      Sit to stands  YTB around knees 2x10  YTB 3x10  YTB 3x10  Mini Squat c YTB                                                                   Ther-Act                                                               Modalities                      CP prn  15'

## 2021-09-15 ENCOUNTER — OFFICE VISIT (OUTPATIENT)
Dept: PHYSICAL THERAPY | Facility: CLINIC | Age: 46
End: 2021-09-15
Payer: COMMERCIAL

## 2021-09-15 DIAGNOSIS — M25.561 PATELLOFEMORAL JOINT PAIN, RIGHT: Primary | ICD-10-CM

## 2021-09-15 DIAGNOSIS — M22.8X1 PATELLAR TRACKING DISORDER OF RIGHT KNEE: ICD-10-CM

## 2021-09-15 DIAGNOSIS — M76.891 TENDINITIS OF RIGHT QUADRICEPS TENDON: ICD-10-CM

## 2021-09-15 PROCEDURE — 97140 MANUAL THERAPY 1/> REGIONS: CPT | Performed by: PHYSICAL THERAPIST

## 2021-09-15 PROCEDURE — 97110 THERAPEUTIC EXERCISES: CPT | Performed by: PHYSICAL THERAPIST

## 2021-09-15 NOTE — PROGRESS NOTES
Daily Note     Today's date: 9/15/2021  Patient name: Milly Perez  : 1975  MRN: [id number]  Referring provider: Mena Ponce MD  Dx:   Encounter Diagnosis     ICD-10-CM    1  Patellofemoral joint pain, right  M25 561    2  Patellar tracking disorder of right knee  M22 8X1    3  Tendinitis of right quadriceps tendon  M76 891                   Subjective: Patient reports little to no change with her knee pain  She states she does notice less clicking and popping when sitting down/standing up and is experiencing more soreness when sitting  Objective: See treatment diary below    Pt  Was seen by ROMINA Pulido under the direct supervision of Ramesh Williamson PT, DPT  Assessment: Odilia tolerated today's treatment session well  She was progressed with functional exercises including step up + overs  She required moderate cueing for lateral step downs to avoid valgus at hip joint due to weak gluteus medius  Standing hip abductions were added today to address glute med weakness  Patient demonstrated exercises with minimal complaints of discomfort  She is progressing towards goals and would benefit from continued skilled PT services to address deficits and improve function  Plan: Continue per plan of care  Progress treatment as tolerated         Precautions: Blood thinners     Daily Treatment Diary:      Initial Evaluation Date: 21  Compliance 9/8  9/9  9/13  9/15               Visit Number 1 2  3  4               Re-Eval  IE -  -  -            MC   Foto Captured Y -  -  -                      9/8  9/9  9/13  9/15               Manual                      ITB rolling + retinaculum IASTM -  15'  15'  10'                                                           Ther-Ex                      TFL stretch c strap 30"x4  30"x4  30x4"  30x4"               Prone quad stretch 30"x4  30"x4  30"x4  30"x4               Quad sets 5"x20 5"x20  5"x30 5" x30         Prone quad sets -  5"x10  5"x20  5"x20 Bridge    nv c YTB                      Standing hip abd    YTB 2x10         SLR 4 way -  2x10ea  2x10ea  2x10               SAQs - - -          Supine Hip ADD -  10x10"  10x10"  20x10"               Standing TKE with T-band -  5"x20 YTB  5" x20 YTB  -               Lateral step down -  20x  20x  4" 20x               Fwd step up + over       15x               [address] [facility]  10'  10'  10'               Neuro Re-Ed                      Sit to stands  YTB around knees 2x10  YTB 3x10  YTB 3x10  Mini Squat c YTB 3x10                                                                   Ther-Act                                                               Modalities                      CP prn  15'   -

## 2021-09-20 ENCOUNTER — OFFICE VISIT (OUTPATIENT)
Dept: PHYSICAL THERAPY | Facility: CLINIC | Age: 46
End: 2021-09-20
Payer: COMMERCIAL

## 2021-09-20 DIAGNOSIS — M22.8X1 PATELLAR TRACKING DISORDER OF RIGHT KNEE: ICD-10-CM

## 2021-09-20 DIAGNOSIS — M76.891 TENDINITIS OF RIGHT QUADRICEPS TENDON: ICD-10-CM

## 2021-09-20 DIAGNOSIS — M25.561 PATELLOFEMORAL JOINT PAIN, RIGHT: Primary | ICD-10-CM

## 2021-09-20 PROCEDURE — 97110 THERAPEUTIC EXERCISES: CPT | Performed by: PHYSICAL THERAPIST

## 2021-09-20 PROCEDURE — 97140 MANUAL THERAPY 1/> REGIONS: CPT | Performed by: PHYSICAL THERAPIST

## 2021-09-20 NOTE — PROGRESS NOTES
Daily Note     Today's date: 2021  Patient name: Amalia Solis  : 1975  MRN: [id number]  Referring provider: Zain Horvath MD  Dx:   Encounter Diagnosis     ICD-10-CM    1  Patellofemoral joint pain, right  M25 561    2  Patellar tracking disorder of right knee  M22 8X1    3  Tendinitis of right quadriceps tendon  M76 891                   Subjective: Patient reports she had a busy weekend and was on her feet a lot which irritated her knee  Objective: See treatment diary below    Pt  Was seen by ROMINA Becerril under the direct supervision of Victoria Gamino PT, DPT  Assessment: Odilia tolerated today's treatment session well  She was progressed with exercises for stability at the knee joint  She continues to experience irritation at the knee joint with functional activities and requires moderate cueing for proper technique with step downs  Her ITB tightness has show improvement with stretching and manual therapy  She continues to show good effort during therapy and is compliant with her HEP  She would benefit from continued skilled therapy services to address deficits and maximize function  Plan: Continue per plan of care  Progress treatment as tolerated         Precautions: Blood thinners     Daily Treatment Diary:      Initial Evaluation Date: 21  Compliance 9/8  9/9  9/13  9/15  9/20             Visit Number 1 2  3  4  5             Re-Eval  IE -  -  -  -          MC   Foto Captured Y -  -  -  -                    9/8  9/9  9/13  9/15  9/20             Manual                      ITB rolling + retinaculum IASTM -  15'  15'  10'  15'                                                         Ther-Ex                      TFL stretch c strap 30"x4  30"x4  30x4"  30x4"  30"x4             Prone quad stretch 30"x4  30"x4  30"x4  30"x4  30"x4             Quad sets 5"x20 5"x20  5"x30 5" x30 5"x20        Prone quad sets -  5"x10  5"x20  5"x20 5"x20             Bridge     2x10 Standing hip abd    YTB 2x10 YTB 2x10        SLR 4 way -  2x10ea  2x10ea  2x10  2x10             SAQs - - -          Supine Hip ADD -  10x10"  10x10"  20x10"  20x10"             Standing TKE with T-band -  5"x20 YTB  5" x20 YTB  -  5"x20 YTB             Lateral step down -  20x  20x  4" 20x  4" 30x             Fwd step up + over       15x  20x             [address] [facility]  10'  10'  10'  10'             Neuro Re-Ed                      Sit to stands  YTB around knees 2x10  YTB 3x10  YTB 3x10  Mini Squat c YTB 3x10 Squat YTB 3x10                                                                 Ther-Act                                                               Modalities                      CP prn  15'   -   -

## 2021-09-22 ENCOUNTER — OFFICE VISIT (OUTPATIENT)
Dept: PHYSICAL THERAPY | Facility: CLINIC | Age: 46
End: 2021-09-22
Payer: COMMERCIAL

## 2021-09-22 DIAGNOSIS — M22.8X1 PATELLAR TRACKING DISORDER OF RIGHT KNEE: ICD-10-CM

## 2021-09-22 DIAGNOSIS — M76.891 TENDINITIS OF RIGHT QUADRICEPS TENDON: ICD-10-CM

## 2021-09-22 DIAGNOSIS — M25.561 PATELLOFEMORAL JOINT PAIN, RIGHT: Primary | ICD-10-CM

## 2021-09-22 PROCEDURE — 97140 MANUAL THERAPY 1/> REGIONS: CPT | Performed by: PHYSICAL THERAPIST

## 2021-09-22 PROCEDURE — 97110 THERAPEUTIC EXERCISES: CPT | Performed by: PHYSICAL THERAPIST

## 2021-09-22 NOTE — PROGRESS NOTES
Daily Note     Today's date: 2021  Patient name: Ana Samaniego  : 1975  MRN: [id number]  Referring provider: Natalee Youssef MD  Dx:   Encounter Diagnosis     ICD-10-CM    1  Patellofemoral joint pain, right  M25 561    2  Patellar tracking disorder of right knee  M22 8X1    3  Tendinitis of right quadriceps tendon  M76 891                   Subjective: Patient reports her knee felt really good after her last therapy session  She states yesterday her knee was more sore and she felt more clicking when doing steps and sitting down  Objective: See treatment diary below    Pt  Was seen by ROMINA Perera under the direct supervision of Thalia Lopez, PT, DPT  Assessment: Odilia tolerated today's therapy session well  She was progressed with strengthening exercises  She continues to experience discomfort with eccentric exercises  Forward step downs were added to focus on this deficit and educate on the importance of proper body mechanics with functional activities  She continues to show good motivation to reach goals and is compliant with her HEP  She would benefit from continued skilled therapy to address deficits and improve function  Plan: Continue per plan of care  Progress treatment as tolerated         Precautions: Blood thinners     Daily Treatment Diary:      Initial Evaluation Date: 21  Compliance 9/8  9/9  9/13  9/15  9/20  9/22           Visit Number 1 2  3  4  5  6           Re-Eval  IE -  -  -  -  -        MC   Foto Captured Y -  -  -  -  -                  9/8  9/9  9/13  9/15  9/20  9/22           Manual                      ITB rolling + retinaculum IASTM -  15'  15'  10'  15'  15'                                                       Ther-Ex                      TFL stretch c strap 30"x4  30"x4  30x4"  30x4"  30"x4  30"x4           Prone quad stretch 30"x4  30"x4  30"x4  30"x4  30"x4  30"x4           Quad sets 5"x20 5"x20  5"x30 5" x30 5"x20  -       Prone quad sets - 5"x10  5"x20  5"x20 5"x20  5"x20           Bridge     2x10 2x10                    Standing hip abd    YTB 2x10 YTB 2x10 YTB 30x       SLR 4 way -  2x10ea  2x10ea  2x10  2x10 1 5# 2x10           SAQs - - -   1 5# 5"x20       Supine Hip ADD -  10x10"  10x10"  20x10"  20x10"  5' 5"/5"           Standing TKE with T-band -  5"x20 YTB  5" x20 YTB  -  5"x20 YTB  5"x20 GTB           Lateral step down -  20x  20x  4" 20x  4" 30x  4" 30x           Fwd step up + over       15x  20x  -           [address] [facility]  10'  10'  10'  10'  10'           Neuro Re-Ed                      Sit to stands  YTB around knees 2x10  YTB 3x10  YTB 3x10  Mini Squat c YTB 3x10 Squat YTB 3x10  Squat YTB 3x10           Forward Step-Down - - - - - 30x       Forward lunge      nv                                 Ther-Act                                                               Modalities                      CP prn  15'   -   -  -

## 2021-09-27 ENCOUNTER — OFFICE VISIT (OUTPATIENT)
Dept: PHYSICAL THERAPY | Facility: CLINIC | Age: 46
End: 2021-09-27
Payer: COMMERCIAL

## 2021-09-27 DIAGNOSIS — M22.8X1 PATELLAR TRACKING DISORDER OF RIGHT KNEE: ICD-10-CM

## 2021-09-27 DIAGNOSIS — M25.561 PATELLOFEMORAL JOINT PAIN, RIGHT: Primary | ICD-10-CM

## 2021-09-27 DIAGNOSIS — M76.891 TENDINITIS OF RIGHT QUADRICEPS TENDON: ICD-10-CM

## 2021-09-27 PROCEDURE — 97110 THERAPEUTIC EXERCISES: CPT | Performed by: PHYSICAL THERAPIST

## 2021-09-27 PROCEDURE — 97140 MANUAL THERAPY 1/> REGIONS: CPT | Performed by: PHYSICAL THERAPIST

## 2021-09-27 NOTE — PROGRESS NOTES
Daily Note     Today's date: 2021  Patient name: Steven Vieira  : 1975  MRN: [id number]  Referring provider: Brennen Acevedo MD  Dx:   Encounter Diagnosis     ICD-10-CM    1  Patellofemoral joint pain, right  M25 561    2  Patellar tracking disorder of right knee  M22 8X1    3  Tendinitis of right quadriceps tendon  M76 891                   Subjective: Patient reports little change in her knee soreness other than less clicking over the weekend  She did state her left shin was cramping last night, though she did walk a lot this past weekend  Objective: See treatment diary below    Pt  Was seen by ROMINA Garcia under the direct supervision of Vernon Woods PT, DPT  Assessment: Odilia tolerated today's treatment session well  She continues to experience irritation and pulling at her lateral knee during functional exercises  She is progressing with strengthening exercises  She is showing slow, steady progress, likely due to the chronicity of the pain  She demonstrates good motivation in therapy and would benefit from continuing to address deficits and improve function  Plan: Continue per plan of care  Progress treatment as tolerated         Precautions: Blood thinners     Daily Treatment Diary:      Initial Evaluation Date: 21  Compliance 9/8  9/9  9/13  9/15  9/20  9/22  9/27         Visit Number 1 2  3  4  5  6  7         Re-Jorge  IE -  -  -  -  -  -      MC   Foto Captured Y -  -  -  -  -  Y                9/8  9/9  9/13  9/15  9/20  9/22  9/27         Manual                      ITB rolling + retinaculum IASTM -  15'  15'  10'  15'  15'  10'                                                      Ther-Ex                      TFL stretch c strap 30"x4  30"x4  30x4"  30x4"  30"x4  30"x4 30"x4         Prone quad stretch 30"x4  30"x4  30"x4  30"x4  30"x4  30"x4  30"x4         Quad sets 5"x20 5"x20  5"x30 5" x30 5"x20  - -      Prone quad sets -  5"x10  5"x20  5"x20 5"x20  5"x20  5"x20 Bridge     2x10 2x10 2x10                   Standing hip abd    YTB 2x10 YTB 2x10 YTB 30x YTB 30x      SLR 4 way -  2x10ea  2x10ea  2x10  2x10 1 5# 2x10  2# 3x10         SAQs - - -   1 5# 5"x20 2# 5"x30      Supine Hip ADD -  10x10"  10x10"  20x10"  20x10"  5' 5"/5"  5' 5"/5"         Standing TKE with T-band -  5"x20 YTB  5" x20 YTB  -  5"x20 YTB  5"x20 GTB  5"x30 GTB         Lateral step down -  20x  20x  4" 20x  4" 30x  4" 30x  4" 30x         Fwd step up + over       15x  20x  -  -         [facility]  10'  10'  10'  10'  10'  10'         Neuro Re-Ed                      Sit to stands  YTB around knees 2x10  YTB 3x10  YTB 3x10  Mini Squat c YTB 3x10 Squat YTB 3x10  Squat YTB 3x10  Squat YTB 3x10         Forward Step-Down - - - - - 30x 30x 6"      Forward lunge       nv                                Ther-Act                                                               Modalities                      CP prn  15'   -   -  - -

## 2021-09-29 ENCOUNTER — OFFICE VISIT (OUTPATIENT)
Dept: PHYSICAL THERAPY | Facility: CLINIC | Age: 46
End: 2021-09-29
Payer: COMMERCIAL

## 2021-09-29 DIAGNOSIS — M25.561 PATELLOFEMORAL JOINT PAIN, RIGHT: Primary | ICD-10-CM

## 2021-09-29 DIAGNOSIS — M76.891 TENDINITIS OF RIGHT QUADRICEPS TENDON: ICD-10-CM

## 2021-09-29 DIAGNOSIS — M22.8X1 PATELLAR TRACKING DISORDER OF RIGHT KNEE: ICD-10-CM

## 2021-09-29 PROCEDURE — 97110 THERAPEUTIC EXERCISES: CPT | Performed by: PHYSICAL THERAPIST

## 2021-09-29 PROCEDURE — 97140 MANUAL THERAPY 1/> REGIONS: CPT | Performed by: PHYSICAL THERAPIST

## 2021-09-29 NOTE — PROGRESS NOTES
Daily Note     Today's date: 2021  Patient name: Marita Nina  : 1975  MRN: [id number]  Referring provider: William Nazario MD  Dx:   Encounter Diagnosis     ICD-10-CM    1  Patellofemoral joint pain, right  M25 561    2  Patellar tracking disorder of right knee  M22 8X1    3  Tendinitis of right quadriceps tendon  M76 891                   Subjective: Patient reports little to no change in her knee pain and continues to feeling pulling and irritation with activities such as sit to stand and going up/down steps  Objective: See treatment diary below    Pt  Was seen by ROMINA Lopez under the direct supervision of Karina Meyer, PT, DPT  Assessment: Odilia tolerated today's treatment session well  She is showing slow progress  We incorporated increased strengthening exercises to address persistent deficits  She experienced moderate discomfort  She is compliant with her HEP and demonstrates good motivation in therapy  She would benefit from continued skilled therapy to address deficits and improve function  Plan: Continue per plan of care  Progress treatment as tolerated         Precautions: Blood thinners     Daily Treatment Diary:      Initial Evaluation Date: 21  Compliance 9/8  9/9  9/13  9/15  9/20  9/22  9/27  9/29       Visit Number 1 2  3  4  5  6  7  8       Re-Eval  IE -  -  -  -  -  -  -    MC   Foto Captured Y -  -  -  -  -  Y  -              9/8  9/9  9/13  9/15  9/20  9/22  9/27  9/29       Manual                      ITB rolling + retinaculum IASTM -  15'  15'  10'  15'  15'  10'  15'                                                    Ther-Ex                      TFL stretch c strap 30"x4  30"x4  30x4"  30x4"  30"x4  30"x4 30"x4  30"x4       Prone quad stretch 30"x4  30"x4  30"x4  30"x4  30"x4  30"x4  30"x4  30"x4       Quad sets 5"x20 5"x20  5"x30 5" x30 5"x20  - -  -     Prone quad sets -  5"x10  5"x20  5"x20 5"x20  5"x20  5"x20  -       Bridge     2x10 2x10 2x10  c blue TB 2x10                  Standing hip abd    YTB 2x10 YTB 2x10 YTB 30x YTB 30x YTB 30x     SLR 4 way -  2x10ea  2x10ea  2x10  2x10 1 5# 2x10  2# 3x10  3# 2x10       SAQs - - -   1 5# 5"x20 2# 5"x30  LAQ #3 5"x30                  Clamshells         Blue TB 2x10     Supine Hip ADD -  10x10"  10x10"  20x10"  20x10"  5' 5"/5"  5' 5"/5"  -       Standing TKE with T-band -  5"x20 YTB  5" x20 YTB  -  5"x20 YTB  5"x20 GTB  5"x30 GTB  -       Lateral step down -  20x  20x  4" 20x  4" 30x  4" 30x  4" 30x  6" 30x       Fwd step up + over       15x  20x  -  -  -       [address] [facility]  10'  10'  10'  10'  10'  10'  10'       Neuro Re-Ed                      Sit to stands  YTB around knees 2x10  YTB 3x10  YTB 3x10  Mini Squat c YTB 3x10 Squat YTB 3x10  Squat YTB 3x10  Squat YTB 3x10  Squat YTB 3x10  Blue TB nv     Forward Step-Down - - - - - 30x 30x 6" 30x 6"  8" nv    Forward lunge       nv  1L     Side stepping        -   1L                   Ther-Act                                                               Modalities                      CP prn  15'   -   -  - -

## 2021-10-04 ENCOUNTER — OFFICE VISIT (OUTPATIENT)
Dept: PHYSICAL THERAPY | Facility: CLINIC | Age: 46
End: 2021-10-04
Payer: COMMERCIAL

## 2021-10-04 DIAGNOSIS — M25.561 PATELLOFEMORAL JOINT PAIN, RIGHT: Primary | ICD-10-CM

## 2021-10-04 DIAGNOSIS — M22.8X1 PATELLAR TRACKING DISORDER OF RIGHT KNEE: ICD-10-CM

## 2021-10-04 DIAGNOSIS — M76.891 TENDINITIS OF RIGHT QUADRICEPS TENDON: ICD-10-CM

## 2021-10-04 PROCEDURE — 97140 MANUAL THERAPY 1/> REGIONS: CPT | Performed by: PHYSICAL THERAPIST

## 2021-10-04 PROCEDURE — 97110 THERAPEUTIC EXERCISES: CPT | Performed by: PHYSICAL THERAPIST

## 2021-10-06 ENCOUNTER — OFFICE VISIT (OUTPATIENT)
Dept: PHYSICAL THERAPY | Facility: CLINIC | Age: 46
End: 2021-10-06
Payer: COMMERCIAL

## 2021-10-06 DIAGNOSIS — M25.561 PATELLOFEMORAL JOINT PAIN, RIGHT: ICD-10-CM

## 2021-10-06 DIAGNOSIS — M22.8X1 PATELLAR TRACKING DISORDER OF RIGHT KNEE: ICD-10-CM

## 2021-10-06 DIAGNOSIS — M76.891 TENDINITIS OF RIGHT QUADRICEPS TENDON: Primary | ICD-10-CM

## 2021-10-06 PROCEDURE — 97140 MANUAL THERAPY 1/> REGIONS: CPT | Performed by: PHYSICAL THERAPIST

## 2021-10-06 PROCEDURE — 97110 THERAPEUTIC EXERCISES: CPT | Performed by: PHYSICAL THERAPIST

## 2021-10-11 ENCOUNTER — OFFICE VISIT (OUTPATIENT)
Dept: PHYSICAL THERAPY | Facility: CLINIC | Age: 46
End: 2021-10-11
Payer: COMMERCIAL

## 2021-10-11 DIAGNOSIS — M25.561 PATELLOFEMORAL JOINT PAIN, RIGHT: Primary | ICD-10-CM

## 2021-10-11 DIAGNOSIS — M76.891 TENDINITIS OF RIGHT QUADRICEPS TENDON: ICD-10-CM

## 2021-10-11 DIAGNOSIS — M22.8X1 PATELLAR TRACKING DISORDER OF RIGHT KNEE: ICD-10-CM

## 2021-10-11 PROCEDURE — 97110 THERAPEUTIC EXERCISES: CPT | Performed by: PHYSICAL THERAPIST

## 2021-10-11 PROCEDURE — 97140 MANUAL THERAPY 1/> REGIONS: CPT | Performed by: PHYSICAL THERAPIST

## 2021-10-11 PROCEDURE — 97112 NEUROMUSCULAR REEDUCATION: CPT | Performed by: PHYSICAL THERAPIST

## 2021-10-13 ENCOUNTER — OFFICE VISIT (OUTPATIENT)
Dept: PHYSICAL THERAPY | Facility: CLINIC | Age: 46
End: 2021-10-13
Payer: COMMERCIAL

## 2021-10-13 DIAGNOSIS — M76.891 TENDINITIS OF RIGHT QUADRICEPS TENDON: ICD-10-CM

## 2021-10-13 DIAGNOSIS — M25.561 PATELLOFEMORAL JOINT PAIN, RIGHT: Primary | ICD-10-CM

## 2021-10-13 DIAGNOSIS — M22.8X1 PATELLAR TRACKING DISORDER OF RIGHT KNEE: ICD-10-CM

## 2021-10-13 PROCEDURE — 97110 THERAPEUTIC EXERCISES: CPT | Performed by: PHYSICAL THERAPIST

## 2021-10-13 PROCEDURE — 97140 MANUAL THERAPY 1/> REGIONS: CPT | Performed by: PHYSICAL THERAPIST

## 2021-10-13 PROCEDURE — 97112 NEUROMUSCULAR REEDUCATION: CPT | Performed by: PHYSICAL THERAPIST

## 2021-10-18 ENCOUNTER — EVALUATION (OUTPATIENT)
Dept: PHYSICAL THERAPY | Facility: CLINIC | Age: 46
End: 2021-10-18
Payer: COMMERCIAL

## 2021-10-18 DIAGNOSIS — M22.8X1 PATELLAR TRACKING DISORDER OF RIGHT KNEE: ICD-10-CM

## 2021-10-18 DIAGNOSIS — M25.561 PATELLOFEMORAL JOINT PAIN, RIGHT: ICD-10-CM

## 2021-10-18 DIAGNOSIS — M76.891 TENDINITIS OF RIGHT QUADRICEPS TENDON: Primary | ICD-10-CM

## 2021-10-18 PROCEDURE — 97140 MANUAL THERAPY 1/> REGIONS: CPT | Performed by: PHYSICAL THERAPIST

## 2021-10-18 PROCEDURE — 97164 PT RE-EVAL EST PLAN CARE: CPT | Performed by: PHYSICAL THERAPIST

## 2021-10-18 PROCEDURE — 97110 THERAPEUTIC EXERCISES: CPT | Performed by: PHYSICAL THERAPIST

## 2021-10-20 ENCOUNTER — OFFICE VISIT (OUTPATIENT)
Dept: PHYSICAL THERAPY | Facility: CLINIC | Age: 46
End: 2021-10-20
Payer: COMMERCIAL

## 2021-10-20 DIAGNOSIS — M76.891 TENDINITIS OF RIGHT QUADRICEPS TENDON: Primary | ICD-10-CM

## 2021-10-20 DIAGNOSIS — M25.561 PATELLOFEMORAL JOINT PAIN, RIGHT: ICD-10-CM

## 2021-10-20 DIAGNOSIS — M22.8X1 PATELLAR TRACKING DISORDER OF RIGHT KNEE: ICD-10-CM

## 2021-10-20 PROCEDURE — 97112 NEUROMUSCULAR REEDUCATION: CPT | Performed by: PHYSICAL THERAPIST

## 2021-10-20 PROCEDURE — 97110 THERAPEUTIC EXERCISES: CPT | Performed by: PHYSICAL THERAPIST

## 2021-10-20 PROCEDURE — 97140 MANUAL THERAPY 1/> REGIONS: CPT | Performed by: PHYSICAL THERAPIST

## 2021-10-25 ENCOUNTER — OFFICE VISIT (OUTPATIENT)
Dept: PHYSICAL THERAPY | Facility: CLINIC | Age: 46
End: 2021-10-25
Payer: COMMERCIAL

## 2021-10-25 DIAGNOSIS — M76.891 TENDINITIS OF RIGHT QUADRICEPS TENDON: Primary | ICD-10-CM

## 2021-10-25 DIAGNOSIS — M22.8X1 PATELLAR TRACKING DISORDER OF RIGHT KNEE: ICD-10-CM

## 2021-10-25 DIAGNOSIS — M25.561 PATELLOFEMORAL JOINT PAIN, RIGHT: ICD-10-CM

## 2021-10-25 PROCEDURE — 97110 THERAPEUTIC EXERCISES: CPT | Performed by: PHYSICAL THERAPIST

## 2021-10-25 PROCEDURE — 97140 MANUAL THERAPY 1/> REGIONS: CPT | Performed by: PHYSICAL THERAPIST

## 2021-10-27 ENCOUNTER — OFFICE VISIT (OUTPATIENT)
Dept: PHYSICAL THERAPY | Facility: CLINIC | Age: 46
End: 2021-10-27
Payer: COMMERCIAL

## 2021-10-27 DIAGNOSIS — M76.891 TENDINITIS OF RIGHT QUADRICEPS TENDON: ICD-10-CM

## 2021-10-27 DIAGNOSIS — M25.561 PATELLOFEMORAL JOINT PAIN, RIGHT: Primary | ICD-10-CM

## 2021-10-27 DIAGNOSIS — M22.8X1 PATELLAR TRACKING DISORDER OF RIGHT KNEE: ICD-10-CM

## 2021-10-27 PROCEDURE — 97140 MANUAL THERAPY 1/> REGIONS: CPT

## 2021-10-27 PROCEDURE — 97110 THERAPEUTIC EXERCISES: CPT

## 2021-11-01 ENCOUNTER — OFFICE VISIT (OUTPATIENT)
Dept: PHYSICAL THERAPY | Facility: CLINIC | Age: 46
End: 2021-11-01
Payer: COMMERCIAL

## 2021-11-01 DIAGNOSIS — M25.561 PATELLOFEMORAL JOINT PAIN, RIGHT: Primary | ICD-10-CM

## 2021-11-01 DIAGNOSIS — M76.891 TENDINITIS OF RIGHT QUADRICEPS TENDON: ICD-10-CM

## 2021-11-01 DIAGNOSIS — M22.8X1 PATELLAR TRACKING DISORDER OF RIGHT KNEE: ICD-10-CM

## 2021-11-01 PROCEDURE — 97140 MANUAL THERAPY 1/> REGIONS: CPT

## 2021-11-01 PROCEDURE — 97110 THERAPEUTIC EXERCISES: CPT

## 2021-11-03 ENCOUNTER — OFFICE VISIT (OUTPATIENT)
Dept: PHYSICAL THERAPY | Facility: CLINIC | Age: 46
End: 2021-11-03
Payer: COMMERCIAL

## 2021-11-03 DIAGNOSIS — M76.891 TENDINITIS OF RIGHT QUADRICEPS TENDON: Primary | ICD-10-CM

## 2021-11-03 DIAGNOSIS — M22.8X1 PATELLAR TRACKING DISORDER OF RIGHT KNEE: ICD-10-CM

## 2021-11-03 DIAGNOSIS — M25.561 PATELLOFEMORAL JOINT PAIN, RIGHT: ICD-10-CM

## 2021-11-03 PROCEDURE — 97140 MANUAL THERAPY 1/> REGIONS: CPT

## 2021-11-03 PROCEDURE — 97110 THERAPEUTIC EXERCISES: CPT

## 2021-11-08 ENCOUNTER — OFFICE VISIT (OUTPATIENT)
Dept: PHYSICAL THERAPY | Facility: CLINIC | Age: 46
End: 2021-11-08
Payer: COMMERCIAL

## 2021-11-08 DIAGNOSIS — M25.561 PATELLOFEMORAL JOINT PAIN, RIGHT: ICD-10-CM

## 2021-11-08 DIAGNOSIS — M22.8X1 PATELLAR TRACKING DISORDER OF RIGHT KNEE: ICD-10-CM

## 2021-11-08 DIAGNOSIS — M76.891 TENDINITIS OF RIGHT QUADRICEPS TENDON: Primary | ICD-10-CM

## 2021-11-08 PROCEDURE — 97112 NEUROMUSCULAR REEDUCATION: CPT

## 2021-11-08 PROCEDURE — 97110 THERAPEUTIC EXERCISES: CPT

## 2021-11-10 ENCOUNTER — OFFICE VISIT (OUTPATIENT)
Dept: PHYSICAL THERAPY | Facility: CLINIC | Age: 46
End: 2021-11-10
Payer: COMMERCIAL

## 2021-11-10 DIAGNOSIS — M76.891 TENDINITIS OF RIGHT QUADRICEPS TENDON: Primary | ICD-10-CM

## 2021-11-10 DIAGNOSIS — M25.561 PATELLOFEMORAL JOINT PAIN, RIGHT: ICD-10-CM

## 2021-11-10 DIAGNOSIS — M22.8X1 PATELLAR TRACKING DISORDER OF RIGHT KNEE: ICD-10-CM

## 2021-11-10 PROCEDURE — 97112 NEUROMUSCULAR REEDUCATION: CPT

## 2021-11-10 PROCEDURE — 97110 THERAPEUTIC EXERCISES: CPT

## 2021-11-15 ENCOUNTER — OFFICE VISIT (OUTPATIENT)
Dept: PHYSICAL THERAPY | Facility: CLINIC | Age: 46
End: 2021-11-15
Payer: COMMERCIAL

## 2021-11-15 DIAGNOSIS — M25.561 PATELLOFEMORAL JOINT PAIN, RIGHT: ICD-10-CM

## 2021-11-15 DIAGNOSIS — M22.8X1 PATELLAR TRACKING DISORDER OF RIGHT KNEE: ICD-10-CM

## 2021-11-15 DIAGNOSIS — M76.891 TENDINITIS OF RIGHT QUADRICEPS TENDON: Primary | ICD-10-CM

## 2021-11-15 PROCEDURE — 97110 THERAPEUTIC EXERCISES: CPT

## 2021-11-15 PROCEDURE — 97112 NEUROMUSCULAR REEDUCATION: CPT

## 2021-11-17 ENCOUNTER — OFFICE VISIT (OUTPATIENT)
Dept: PHYSICAL THERAPY | Facility: CLINIC | Age: 46
End: 2021-11-17
Payer: COMMERCIAL

## 2021-11-17 DIAGNOSIS — M25.561 PATELLOFEMORAL JOINT PAIN, RIGHT: ICD-10-CM

## 2021-11-17 DIAGNOSIS — M76.891 TENDINITIS OF RIGHT QUADRICEPS TENDON: Primary | ICD-10-CM

## 2021-11-17 DIAGNOSIS — M22.8X1 PATELLAR TRACKING DISORDER OF RIGHT KNEE: ICD-10-CM

## 2021-11-17 PROCEDURE — 97110 THERAPEUTIC EXERCISES: CPT

## 2021-11-17 PROCEDURE — 97112 NEUROMUSCULAR REEDUCATION: CPT

## 2021-11-22 ENCOUNTER — OFFICE VISIT (OUTPATIENT)
Dept: PHYSICAL THERAPY | Facility: CLINIC | Age: 46
End: 2021-11-22
Payer: COMMERCIAL

## 2021-11-22 DIAGNOSIS — M76.891 TENDINITIS OF RIGHT QUADRICEPS TENDON: Primary | ICD-10-CM

## 2021-11-22 DIAGNOSIS — M22.8X1 PATELLAR TRACKING DISORDER OF RIGHT KNEE: ICD-10-CM

## 2021-11-22 DIAGNOSIS — M25.561 PATELLOFEMORAL JOINT PAIN, RIGHT: ICD-10-CM

## 2021-11-22 PROCEDURE — 97112 NEUROMUSCULAR REEDUCATION: CPT

## 2021-11-22 PROCEDURE — 97110 THERAPEUTIC EXERCISES: CPT

## 2021-11-24 ENCOUNTER — OFFICE VISIT (OUTPATIENT)
Dept: PHYSICAL THERAPY | Facility: CLINIC | Age: 46
End: 2021-11-24
Payer: COMMERCIAL

## 2021-11-24 DIAGNOSIS — M25.561 PATELLOFEMORAL JOINT PAIN, RIGHT: Primary | ICD-10-CM

## 2021-11-24 DIAGNOSIS — M76.891 TENDINITIS OF RIGHT QUADRICEPS TENDON: ICD-10-CM

## 2021-11-24 DIAGNOSIS — M22.8X1 PATELLAR TRACKING DISORDER OF RIGHT KNEE: ICD-10-CM

## 2021-11-24 PROCEDURE — 97112 NEUROMUSCULAR REEDUCATION: CPT

## 2021-11-24 PROCEDURE — 97110 THERAPEUTIC EXERCISES: CPT

## 2021-11-29 ENCOUNTER — EVALUATION (OUTPATIENT)
Dept: PHYSICAL THERAPY | Facility: CLINIC | Age: 46
End: 2021-11-29
Payer: COMMERCIAL

## 2021-11-29 DIAGNOSIS — M22.8X1 PATELLAR TRACKING DISORDER OF RIGHT KNEE: ICD-10-CM

## 2021-11-29 DIAGNOSIS — M25.561 PATELLOFEMORAL JOINT PAIN, RIGHT: Primary | ICD-10-CM

## 2021-11-29 DIAGNOSIS — M76.891 TENDINITIS OF RIGHT QUADRICEPS TENDON: ICD-10-CM

## 2021-11-29 PROCEDURE — 97112 NEUROMUSCULAR REEDUCATION: CPT | Performed by: PHYSICAL THERAPIST

## 2021-11-29 PROCEDURE — 97110 THERAPEUTIC EXERCISES: CPT | Performed by: PHYSICAL THERAPIST

## 2022-12-16 ENCOUNTER — APPOINTMENT (EMERGENCY)
Dept: CT IMAGING | Facility: HOSPITAL | Age: 47
End: 2022-12-16

## 2022-12-16 ENCOUNTER — HOSPITAL ENCOUNTER (INPATIENT)
Facility: HOSPITAL | Age: 47
LOS: 3 days | Discharge: HOME | End: 2022-12-19
Attending: EMERGENCY MEDICINE | Admitting: STUDENT IN AN ORGANIZED HEALTH CARE EDUCATION/TRAINING PROGRAM

## 2022-12-16 ENCOUNTER — APPOINTMENT (EMERGENCY)
Dept: ULTRASOUND IMAGING | Facility: HOSPITAL | Age: 47
End: 2022-12-16

## 2022-12-16 DIAGNOSIS — K80.50 BILIARY COLIC: Primary | ICD-10-CM

## 2022-12-16 DIAGNOSIS — K80.00 ACUTE CHOLECYSTITIS DUE TO BILIARY CALCULUS: ICD-10-CM

## 2022-12-16 DIAGNOSIS — I26.02 ACUTE SADDLE PULMONARY EMBOLISM WITH ACUTE COR PULMONALE (HCC): ICD-10-CM

## 2022-12-16 DIAGNOSIS — E11.9 TYPE 2 DIABETES MELLITUS WITHOUT COMPLICATION, WITHOUT LONG-TERM CURRENT USE OF INSULIN (HCC): ICD-10-CM

## 2022-12-16 PROBLEM — L40.9 PSORIASIS: Status: ACTIVE | Noted: 2022-12-16

## 2022-12-16 LAB
ALBUMIN SERPL BCP-MCNC: 3.7 G/DL (ref 3.5–5)
ALP SERPL-CCNC: 61 U/L (ref 46–116)
ALT SERPL W P-5'-P-CCNC: 57 U/L (ref 12–78)
ANION GAP SERPL CALCULATED.3IONS-SCNC: 9 MMOL/L (ref 4–13)
AST SERPL W P-5'-P-CCNC: 26 U/L (ref 5–45)
ATRIAL RATE: 81 BPM
BASOPHILS # BLD AUTO: 0.05 THOUSANDS/ÂΜL (ref 0–0.1)
BASOPHILS NFR BLD AUTO: 1 % (ref 0–1)
BILIRUB SERPL-MCNC: 0.51 MG/DL (ref 0.2–1)
BUN SERPL-MCNC: 21 MG/DL (ref 5–25)
CALCIUM SERPL-MCNC: 9.2 MG/DL (ref 8.3–10.1)
CARDIAC TROPONIN I PNL SERPL HS: <2 NG/L
CHLORIDE SERPL-SCNC: 101 MMOL/L (ref 96–108)
CO2 SERPL-SCNC: 26 MMOL/L (ref 21–32)
CREAT SERPL-MCNC: 0.9 MG/DL (ref 0.6–1.3)
EOSINOPHIL # BLD AUTO: 0.03 THOUSAND/ÂΜL (ref 0–0.61)
EOSINOPHIL NFR BLD AUTO: 0 % (ref 0–6)
ERYTHROCYTE [DISTWIDTH] IN BLOOD BY AUTOMATED COUNT: 13.7 % (ref 11.6–15.1)
FLUAV RNA RESP QL NAA+PROBE: NEGATIVE
FLUBV RNA RESP QL NAA+PROBE: NEGATIVE
GFR SERPL CREATININE-BSD FRML MDRD: 75 ML/MIN/1.73SQ M
GLUCOSE SERPL-MCNC: 121 MG/DL (ref 65–140)
GLUCOSE SERPL-MCNC: 126 MG/DL (ref 65–140)
GLUCOSE SERPL-MCNC: 127 MG/DL (ref 65–140)
GLUCOSE SERPL-MCNC: 167 MG/DL (ref 65–140)
GLUCOSE SERPL-MCNC: 234 MG/DL (ref 65–140)
HCT VFR BLD AUTO: 42.8 % (ref 34.8–46.1)
HGB BLD-MCNC: 14.1 G/DL (ref 11.5–15.4)
IMM GRANULOCYTES # BLD AUTO: 0.05 THOUSAND/UL (ref 0–0.2)
IMM GRANULOCYTES NFR BLD AUTO: 1 % (ref 0–2)
LACTATE SERPL-SCNC: 1.9 MMOL/L (ref 0.5–2)
LIPASE SERPL-CCNC: 119 U/L (ref 73–393)
LYMPHOCYTES # BLD AUTO: 1.11 THOUSANDS/ÂΜL (ref 0.6–4.47)
LYMPHOCYTES NFR BLD AUTO: 12 % (ref 14–44)
MCH RBC QN AUTO: 28.4 PG (ref 26.8–34.3)
MCHC RBC AUTO-ENTMCNC: 32.9 G/DL (ref 31.4–37.4)
MCV RBC AUTO: 86 FL (ref 82–98)
MONOCYTES # BLD AUTO: 0.35 THOUSAND/ÂΜL (ref 0.17–1.22)
MONOCYTES NFR BLD AUTO: 4 % (ref 4–12)
NEUTROPHILS # BLD AUTO: 7.66 THOUSANDS/ÂΜL (ref 1.85–7.62)
NEUTS SEG NFR BLD AUTO: 82 % (ref 43–75)
NRBC BLD AUTO-RTO: 0 /100 WBCS
P AXIS: 55 DEGREES
PLATELET # BLD AUTO: 264 THOUSANDS/UL (ref 149–390)
PLATELET # BLD AUTO: 294 THOUSANDS/UL (ref 149–390)
PMV BLD AUTO: 10 FL (ref 8.9–12.7)
PMV BLD AUTO: 10.3 FL (ref 8.9–12.7)
POTASSIUM SERPL-SCNC: 3.9 MMOL/L (ref 3.5–5.3)
PR INTERVAL: 166 MS
PROT SERPL-MCNC: 7.1 G/DL (ref 6.4–8.4)
QRS AXIS: 48 DEGREES
QRSD INTERVAL: 84 MS
QT INTERVAL: 396 MS
QTC INTERVAL: 460 MS
RBC # BLD AUTO: 4.96 MILLION/UL (ref 3.81–5.12)
RSV RNA RESP QL NAA+PROBE: NEGATIVE
SARS-COV-2 RNA RESP QL NAA+PROBE: NEGATIVE
SODIUM SERPL-SCNC: 136 MMOL/L (ref 135–147)
T WAVE AXIS: 56 DEGREES
VENTRICULAR RATE: 81 BPM
WBC # BLD AUTO: 9.25 THOUSAND/UL (ref 4.31–10.16)

## 2022-12-16 RX ORDER — ATORVASTATIN CALCIUM 10 MG/1
10 TABLET, FILM COATED ORAL DAILY
COMMUNITY

## 2022-12-16 RX ORDER — INSULIN LISPRO 100 [IU]/ML
1-6 INJECTION, SOLUTION INTRAVENOUS; SUBCUTANEOUS EVERY 6 HOURS SCHEDULED
Status: DISCONTINUED | OUTPATIENT
Start: 2022-12-16 | End: 2022-12-16

## 2022-12-16 RX ORDER — ATORVASTATIN CALCIUM 10 MG/1
10 TABLET, FILM COATED ORAL
Status: DISCONTINUED | OUTPATIENT
Start: 2022-12-16 | End: 2022-12-19 | Stop reason: HOSPADM

## 2022-12-16 RX ORDER — LEVOTHYROXINE SODIUM 0.1 MG/1
100 TABLET ORAL SEE ADMIN INSTRUCTIONS
COMMUNITY

## 2022-12-16 RX ORDER — FOLIC ACID 1 MG/1
TABLET ORAL DAILY
COMMUNITY

## 2022-12-16 RX ORDER — SODIUM CHLORIDE, SODIUM LACTATE, POTASSIUM CHLORIDE, CALCIUM CHLORIDE 600; 310; 30; 20 MG/100ML; MG/100ML; MG/100ML; MG/100ML
75 INJECTION, SOLUTION INTRAVENOUS CONTINUOUS
Status: DISCONTINUED | OUTPATIENT
Start: 2022-12-16 | End: 2022-12-18

## 2022-12-16 RX ORDER — FOLIC ACID 1 MG/1
1 TABLET ORAL DAILY
Status: DISCONTINUED | OUTPATIENT
Start: 2022-12-16 | End: 2022-12-19 | Stop reason: HOSPADM

## 2022-12-16 RX ORDER — LEVOTHYROXINE SODIUM 0.1 MG/1
100 TABLET ORAL
Status: DISCONTINUED | OUTPATIENT
Start: 2022-12-17 | End: 2022-12-19 | Stop reason: HOSPADM

## 2022-12-16 RX ORDER — ATORVASTATIN CALCIUM 10 MG/1
10 TABLET, FILM COATED ORAL DAILY
Status: DISCONTINUED | OUTPATIENT
Start: 2022-12-16 | End: 2022-12-16

## 2022-12-16 RX ORDER — MORPHINE SULFATE 4 MG/ML
4 INJECTION, SOLUTION INTRAMUSCULAR; INTRAVENOUS
Status: DISCONTINUED | OUTPATIENT
Start: 2022-12-16 | End: 2022-12-19 | Stop reason: HOSPADM

## 2022-12-16 RX ORDER — ONDANSETRON 2 MG/ML
4 INJECTION INTRAMUSCULAR; INTRAVENOUS ONCE
Status: COMPLETED | OUTPATIENT
Start: 2022-12-16 | End: 2022-12-16

## 2022-12-16 RX ORDER — INSULIN LISPRO 100 [IU]/ML
1-6 INJECTION, SOLUTION INTRAVENOUS; SUBCUTANEOUS
Status: DISCONTINUED | OUTPATIENT
Start: 2022-12-16 | End: 2022-12-19 | Stop reason: HOSPADM

## 2022-12-16 RX ORDER — LEVOTHYROXINE SODIUM 0.03 MG/1
25 TABLET ORAL
Status: DISCONTINUED | OUTPATIENT
Start: 2022-12-16 | End: 2022-12-16

## 2022-12-16 RX ORDER — KETOROLAC TROMETHAMINE 30 MG/ML
15 INJECTION, SOLUTION INTRAMUSCULAR; INTRAVENOUS EVERY 6 HOURS PRN
Status: DISCONTINUED | OUTPATIENT
Start: 2022-12-16 | End: 2022-12-18

## 2022-12-16 RX ORDER — ENOXAPARIN SODIUM 100 MG/ML
40 INJECTION SUBCUTANEOUS DAILY
Status: DISCONTINUED | OUTPATIENT
Start: 2022-12-16 | End: 2022-12-19 | Stop reason: HOSPADM

## 2022-12-16 RX ORDER — ASPIRIN 81 MG/1
81 TABLET, CHEWABLE ORAL DAILY
Status: DISCONTINUED | OUTPATIENT
Start: 2022-12-16 | End: 2022-12-16

## 2022-12-16 RX ORDER — ONDANSETRON 2 MG/ML
4 INJECTION INTRAMUSCULAR; INTRAVENOUS EVERY 6 HOURS PRN
Status: DISCONTINUED | OUTPATIENT
Start: 2022-12-16 | End: 2022-12-19 | Stop reason: HOSPADM

## 2022-12-16 RX ORDER — ACETAMINOPHEN 325 MG/1
975 TABLET ORAL EVERY 6 HOURS PRN
Status: DISCONTINUED | OUTPATIENT
Start: 2022-12-16 | End: 2022-12-19 | Stop reason: HOSPADM

## 2022-12-16 RX ORDER — KETOROLAC TROMETHAMINE 30 MG/ML
15 INJECTION, SOLUTION INTRAMUSCULAR; INTRAVENOUS ONCE
Status: COMPLETED | OUTPATIENT
Start: 2022-12-16 | End: 2022-12-16

## 2022-12-16 RX ORDER — LEVOTHYROXINE SODIUM 0.2 MG/1
200 TABLET ORAL WEEKLY
COMMUNITY

## 2022-12-16 RX ORDER — FOLIC ACID 1 MG/1
1 TABLET ORAL DAILY
Status: DISCONTINUED | OUTPATIENT
Start: 2022-12-16 | End: 2022-12-16

## 2022-12-16 RX ORDER — LEVOTHYROXINE SODIUM 0.1 MG/1
200 TABLET ORAL
Status: DISCONTINUED | OUTPATIENT
Start: 2022-12-18 | End: 2022-12-19 | Stop reason: HOSPADM

## 2022-12-16 RX ADMIN — ENOXAPARIN SODIUM 40 MG: 40 INJECTION SUBCUTANEOUS at 09:02

## 2022-12-16 RX ADMIN — PIPERACILLIN AND TAZOBACTAM 3.38 G: 36; 4.5 INJECTION, POWDER, FOR SOLUTION INTRAVENOUS at 13:43

## 2022-12-16 RX ADMIN — FOLIC ACID 1 MG: 1 TABLET ORAL at 13:43

## 2022-12-16 RX ADMIN — INSULIN LISPRO 1 UNITS: 100 INJECTION, SOLUTION INTRAVENOUS; SUBCUTANEOUS at 08:37

## 2022-12-16 RX ADMIN — PIPERACILLIN AND TAZOBACTAM 3.38 G: 36; 4.5 INJECTION, POWDER, FOR SOLUTION INTRAVENOUS at 21:54

## 2022-12-16 RX ADMIN — ONDANSETRON 4 MG: 2 INJECTION INTRAMUSCULAR; INTRAVENOUS at 04:55

## 2022-12-16 RX ADMIN — ASPIRIN 81 MG CHEWABLE TABLET 81 MG: 81 TABLET CHEWABLE at 09:02

## 2022-12-16 RX ADMIN — KETOROLAC TROMETHAMINE 15 MG: 30 INJECTION, SOLUTION INTRAMUSCULAR at 04:55

## 2022-12-16 RX ADMIN — LEVOTHYROXINE SODIUM 25 MCG: 25 TABLET ORAL at 08:19

## 2022-12-16 RX ADMIN — SODIUM CHLORIDE, POTASSIUM CHLORIDE, SODIUM LACTATE AND CALCIUM CHLORIDE 75 ML/HR: 600; 310; 30; 20 INJECTION, SOLUTION INTRAVENOUS at 08:20

## 2022-12-16 RX ADMIN — ATORVASTATIN CALCIUM 10 MG: 10 TABLET, FILM COATED ORAL at 15:22

## 2022-12-16 RX ADMIN — IOHEXOL 100 ML: 350 INJECTION, SOLUTION INTRAVENOUS at 06:33

## 2022-12-16 RX ADMIN — SODIUM CHLORIDE 1000 ML: 0.9 INJECTION, SOLUTION INTRAVENOUS at 04:55

## 2022-12-16 RX ADMIN — PIPERACILLIN AND TAZOBACTAM 3.38 G: 36; 4.5 INJECTION, POWDER, FOR SOLUTION INTRAVENOUS at 08:39

## 2022-12-16 NOTE — ASSESSMENT & PLAN NOTE
No results found for: HGBA1C    Recent Labs     12/16/22  0835   POCGLU 167*       Blood Sugar Average: Last 72 hrs:  (P) 167   · Controlled on metformin and diet while being on clear liquid diet and going n p o  after midnight on Sunday  For Sunday surgery will hold metformin and place her on a sliding scale I did discuss that with the patient

## 2022-12-16 NOTE — ED NOTES
Pt requesting for lipitor and folic acid  to be changed to supper time   Call to pharm to adjust the time     Loren Finn RN  12/16/22 101

## 2022-12-16 NOTE — UTILIZATION REVIEW
NOTIFICATION OF INPATIENT ADMISSION   AUTHORIZATION REQUEST   SERVICING FACILITY:   [address] [place] [address]  Faraz Howell [address] [place] [address], [address]  Tax ID: [id number]  NPI: [id number] ATTENDING PROVIDER:  Attending Name and NPI#: Kirill Bliss [[id number]]  Address: [facility]  Faraz Howell [address] [place] [address], [facility] [address]  Phone: [phone]   ADMISSION INFORMATION:  Place of Service: [place] [address]  Place of Service Code: 21  Inpatient Admission Date/Time: 12/16/22  8:04 AM  Discharge Date/Time: No discharge date for patient encounter  Admitting Diagnosis Code/Description:  Vomiting [R11 10]     UTILIZATION REVIEW CONTACT:  Shana Carlton Utilization   Network Utilization Review Department  Phone: [phone]  Fax [phone]  Email: [email or web address]  Contact for approvals/pending authorizations, clinical reviews, and discharge  PHYSICIAN ADVISORY SERVICES:  Medical Necessity Denial & Ferx-xm-Nrwe Review  Phone: [phone]  Fax: [phone]  Email: [email or web address]

## 2022-12-16 NOTE — H&P
H&P Exam - General Surgery   Gary Clifford 50 y o  female MRN: [id number]  Unit/Bed#: ED 01 Encounter: [id number]    Assessment/Plan     Assessment: This is a 64-year-old female with a history of type 2 diabetes and pulmonary embolus who presents due to epigastric abdominal pain, nausea, and vomiting  Clinical evaluation and imaging are suspicious for acute cholecystitis    Plan: Will admit to surgical service for further treatment of acute cholecystitis  Medical consult for comorbidities  The patient took Eliquis last night  We will begin to hold at this time will treat with IV antibiotics and fluids  We will plan for laparoscopic cholecystectomy on 12/18/2022 after Eliquis has been held for 48 hours  Pain control as needed  May have a clear liquid diet as tolerated  Sliding scale insulin ordered  Lovenox DVT prophylaxis      History of Present Illness     HPI:  Gary Clifford is a 50 y o  female who presents with epigastric abdominal pain, nausea, and vomiting  The patient states her symptoms developed suddenly around 6 PM last evening  The pain is only in the epigastric area  It does not radiate  The patient last vomited on presentation to the ER  She is now feeling improved after analgesics and antiemetics  She states she had a mild similar pain in the past   She has had no difficulty eating  She denies any fever or chills  The patient has a history of pulmonary embolism  This developed when she was diagnosed with COVID-19 in 2020  The patient currently takes Eliquis  Her last dose was approximately 930 last night  Review of Systems   Constitutional: Negative  HENT: Negative  Respiratory: Negative  Cardiovascular: Negative  Gastrointestinal: Positive for abdominal pain, nausea and vomiting  Genitourinary: Negative  Musculoskeletal: Negative  Skin: Negative  Neurological: Negative  Hematological: Negative          Historical Information   Past Medical History:   Diagnosis Date   • Diabetes mellitus ([facility] )    • Disease of thyroid gland    • Pituitary mass ([facility] )    • [facility]      Past Surgical History:   Procedure Laterality Date   • NO PAST SURGERIES       Social History   Social History     Substance and Sexual Activity   Alcohol Use Never     Social History     Substance and Sexual Activity   Drug Use Never     Social History     Tobacco Use   Smoking Status Never   Smokeless Tobacco Never     E-Cigarette/Vaping   • E-Cigarette Use Never User      E-Cigarette/Vaping Substances     Family History: non-contributory    Meds/Allergies   all medications and allergies reviewed  Allergies   Allergen Reactions   • Penicillins Hives       Objective   First Vitals:   Blood Pressure: 169/81 (12/16/22 0435)  Pulse: 86 (12/16/22 0435)  Temperature: 98 4 °F (36 9 °C) (12/16/22 0435)  Temp Source: Temporal (12/16/22 0435)  Respirations: 17 (12/16/22 0435)  Height: 5' 8" (172 7 cm) (12/16/22 0434)  Weight - Scale: 103 kg (226 lb 6 6 oz) (12/16/22 0434)  SpO2: 99 % (12/16/22 0435)    Current Vitals:   Blood Pressure: 121/59 (12/16/22 0600)  Pulse: 81 (12/16/22 0600)  Temperature: 98 4 °F (36 9 °C) (12/16/22 0435)  Temp Source: Temporal (12/16/22 0435)  Respirations: 16 (12/16/22 0600)  Height: 5' 8" (172 7 cm) (12/16/22 0434)  Weight - Scale: 103 kg (226 lb 6 6 oz) (12/16/22 0434)  SpO2: 97 % (12/16/22 0600)      Intake/Output Summary (Last 24 hours) at 12/16/2022 0805  Last data filed at 12/16/2022 0617  Gross per 24 hour   Intake 1000 ml   Output --   Net 1000 ml       Invasive Devices     Peripheral Intravenous Line  Duration           Peripheral IV 12/16/22 Right Antecubital <1 day                Physical Exam  Constitutional:       Appearance: Normal appearance  HENT:      Head: Normocephalic and atraumatic  Mouth/Throat:      Mouth: Mucous membranes are moist    Eyes:      General: No scleral icterus  Cardiovascular:      Rate and Rhythm: Normal rate and regular rhythm  Pulmonary:      Effort: Pulmonary effort is normal       Breath sounds: Normal breath sounds  Abdominal:      General: There is distension  Palpations: Abdomen is soft  Tenderness: There is abdominal tenderness (epigastric)  There is guarding (epigastric)  There is no rebound  Hernia: No hernia is present  Musculoskeletal:         General: Normal range of motion  Skin:     General: Skin is warm and dry  Neurological:      General: No focal deficit present  Mental Status: She is alert and oriented to person, place, and time  Lab Results:   CBC:   Lab Results   Component Value Date    WBC 9 25 12/16/2022    HGB 14 1 12/16/2022    HCT 42 8 12/16/2022    MCV 86 12/16/2022     12/16/2022    MCH 28 4 12/16/2022    MCHC 32 9 12/16/2022    RDW 13 7 12/16/2022    MPV 10 3 12/16/2022    NRBC 0 12/16/2022   , CMP:   Lab Results   Component Value Date    SODIUM 136 12/16/2022    K 3 9 12/16/2022     12/16/2022    CO2 26 12/16/2022    BUN 21 12/16/2022    CREATININE 0 90 12/16/2022    CALCIUM 9 2 12/16/2022    AST 26 12/16/2022    ALT 57 12/16/2022    ALKPHOS 61 12/16/2022    EGFR 75 12/16/2022     Imaging: CT scan of the abdomen and pelvis:   IMPRESSION:     No acute pathology in the chest      Findings suggestive of acute calculus cholecystitis  Consider ultrasound for further evaluation  Ultrasound right upper quadrant:  IMPRESSION:  1  Large gallstone in the gallbladder neck as seen on recent CT  Gallbladder distention with borderline prominent gallbladder wall without significant pericholecystic fluid  Correlate clinically to exclude developing cholecystitis  EKG, Pathology, and Other Studies: I have personally reviewed pertinent reports        Code Status: Level 1 - Full Code  Advance Directive and Living Will:      Power of :    POLST:

## 2022-12-16 NOTE — ED PROVIDER NOTES
History  Chief Complaint   Patient presents with   • Vomiting     Pt began with epigastric pain since yesterday @ 1800, pt began vomiting since 2300 last night  PMHX: Gallstones, DM      49-year-old female with spouse describes epigastric pain and vomiting since last evening  No hematemesis  Medications include apixaban for PE  No chest pain or dyspnea  No fever or rigors  History provided by:  Patient and spouse  Medical Problem  Location:  Epigastric  Quality:  Pain  Severity:  Severe  Onset quality:  Sudden  Timing:  Constant  Progression:  Worsening  Chronicity:  New  Context:  Vomiting  Relieved by:  Nothing  Worsened by:  Nothing  Ineffective treatments:  None  Associated symptoms: abdominal pain    Associated symptoms: no chest pain, no fever and no shortness of breath        Prior to Admission Medications   Prescriptions Last Dose Informant Patient Reported?  Taking?   acetaminophen (TYLENOL) 325 mg tablet   No No   Sig: Take 3 tablets (975 mg total) by mouth every 6 (six) hours as needed for mild pain or fever   albuterol (PROVENTIL HFA,VENTOLIN HFA) 90 mcg/act inhaler   Yes No   Sig: Inhale 2 puffs every 6 (six) hours as needed for wheezing   apixaban (ELIQUIS) 5 mg   No No   Sig: Take 1 tablet (5 mg total) by mouth 2 (two) times a day   aspirin 81 mg chewable tablet   Yes No   Sig: Chew 81 mg daily   benzonatate (TESSALON PERLES) 100 mg capsule   No No   Sig: Take 1 capsule (100 mg total) by mouth every 8 (eight) hours   Patient not taking: Reported on 1/26/2021   cabergoline (DOSTINEX) 0 5 MG tablet   Yes No   Sig: Take 0 25 mg by mouth 2 (two) times a week   levothyroxine 25 mcg tablet   Yes No   Sig: Take 25 mcg by mouth daily   norethindrone (MICRONOR) 0 35 MG tablet   Yes No   Sig: Take 1 tablet by mouth daily   potassium chloride (K-DUR,KLOR-CON) 20 mEq tablet   No No   Sig: Take 1 tablet (20 mEq total) by mouth 2 (two) times a day   Patient not taking: Reported on 1/26/2021 Facility-Administered Medications: None       Past Medical History:   Diagnosis Date   • Diabetes mellitus ([facility] )    • Disease of thyroid gland    • [facility]    • Pulmonary embolism (HCC)        Past Surgical History:   Procedure Laterality Date   • NO PAST SURGERIES         Family History   Problem Relation Age of Onset   • Diabetes Mother    • No Known Problems Father      I have reviewed and agree with the history as documented  E-Cigarette/Vaping   • E-Cigarette Use Never User      E-Cigarette/Vaping Substances     Social History     Tobacco Use   • Smoking status: Never   • Smokeless tobacco: Never   Vaping Use   • Vaping Use: Never used   Substance Use Topics   • Alcohol use: Never   • Drug use: Never       Review of Systems   Constitutional: Negative for fever  Respiratory: Negative for shortness of breath  Cardiovascular: Negative for chest pain  Gastrointestinal: Positive for abdominal pain  All other systems reviewed and are negative  Physical Exam  Physical Exam  Vitals and nursing note reviewed  Constitutional:       Comments: Pleasant, comfortable-appearing   HENT:      Head: Normocephalic and atraumatic  Mouth/Throat:      Mouth: Mucous membranes are moist       Pharynx: Oropharynx is clear  Eyes:      Conjunctiva/sclera: Conjunctivae normal       Pupils: Pupils are equal, round, and reactive to light  Cardiovascular:      Rate and Rhythm: Normal rate and regular rhythm  Heart sounds: Normal heart sounds  Pulmonary:      Effort: Pulmonary effort is normal       Breath sounds: Normal breath sounds  Abdominal:      General: Bowel sounds are normal  There is no distension  Palpations: Abdomen is soft  Tenderness: There is abdominal tenderness  There is no guarding or rebound  Musculoskeletal:         General: No deformity  Cervical back: Neck supple  Skin:     General: Skin is warm and dry     Neurological:      General: No focal deficit present  Mental Status: She is alert and oriented to person, place, and time  Cranial Nerves: No cranial nerve deficit  Coordination: Coordination normal    Psychiatric:         Behavior: Behavior normal          Thought Content: Thought content normal          Judgment: Judgment normal          Vital Signs  ED Triage Vitals [12/16/22 0435]   Temperature Pulse Respirations Blood Pressure SpO2   98 4 °F (36 9 °C) 86 17 169/81 99 %      Temp Source Heart Rate Source Patient Position - Orthostatic VS BP Location FiO2 (%)   Temporal Monitor Lying Left arm --      Pain Score       8           Vitals:    12/16/22 0435 12/16/22 0500 12/16/22 0600   BP: 169/81 137/66 121/59   Pulse: 86 55 81   Patient Position - Orthostatic VS: Lying Sitting          Visual Acuity      ED Medications  Medications   sodium chloride 0 9 % bolus 1,000 mL (0 mL Intravenous Stopped 12/16/22 0617)   ondansetron (ZOFRAN) injection 4 mg (4 mg Intravenous Given 12/16/22 0455)   ketorolac (TORADOL) injection 15 mg (15 mg Intravenous Given 12/16/22 0455)   iohexol (OMNIPAQUE) 350 MG/ML injection (SINGLE-DOSE) 100 mL (100 mL Intravenous Given 12/16/22 0762)       Diagnostic Studies  Results Reviewed     Procedure Component Value Units Date/Time    HS Troponin 0hr (reflex protocol) [[id number]]  (Normal) Collected: 12/16/22 0454    Lab Status: Final result Specimen: Blood from Arm, Right Updated: 12/16/22 0617     hs TnI 0hr <2 ng/L     Lactic acid [[id number]]  (Normal) Collected: 12/16/22 0454    Lab Status: Final result Specimen: Blood from Arm, Right Updated: 12/16/22 0615     LACTIC ACID 1 9 mmol/L     Narrative:      Result may be elevated if tourniquet was used during collection      Lipase [[id number]]  (Normal) Collected: 12/16/22 0454    Lab Status: Final result Specimen: Blood from Arm, Right Updated: 12/16/22 0609     Lipase 119 u/L     Comprehensive metabolic panel [[id number]]  (Abnormal) Collected: 12/16/22 0454    Lab Status: Final result Specimen: Blood from Arm, Right Updated: 12/16/22 0609     Sodium 136 mmol/L      Potassium 3 9 mmol/L      Chloride 101 mmol/L      CO2 26 mmol/L      ANION GAP 9 mmol/L      BUN 21 mg/dL      Creatinine 0 90 mg/dL      Glucose 234 mg/dL      Calcium 9 2 mg/dL      AST 26 U/L      ALT 57 U/L      Alkaline Phosphatase 61 U/L      Total Protein 7 1 g/dL      Albumin 3 7 g/dL      Total Bilirubin 0 51 mg/dL      eGFR 75 ml/min/1 73sq m     Narrative:      National Kidney Disease Foundation guidelines for Chronic Kidney Disease (CKD):   •  Stage 1 with normal or high GFR (GFR > 90 mL/min/1 73 square meters)  •  Stage 2 Mild CKD (GFR = 60-89 mL/min/1 73 square meters)  •  Stage 3A Moderate CKD (GFR = 45-59 mL/min/1 73 square meters)  •  Stage 3B Moderate CKD (GFR = 30-44 mL/min/1 73 square meters)  •  Stage 4 Severe CKD (GFR = 15-29 mL/min/1 73 square meters)  •  Stage 5 End Stage CKD (GFR <15 mL/min/1 73 square meters)  Note: GFR calculation is accurate only with a steady state creatinine    CBC and differential [[id number]]  (Abnormal) Collected: 12/16/22 0454    Lab Status: Final result Specimen: Blood from Arm, Right Updated: 12/16/22 0545     WBC 9 25 Thousand/uL      RBC 4 96 Million/uL      Hemoglobin 14 1 g/dL      Hematocrit 42 8 %      MCV 86 fL      MCH 28 4 pg      MCHC 32 9 g/dL      RDW 13 7 %      MPV 10 3 fL      Platelets 608 Thousands/uL      nRBC 0 /100 WBCs      Neutrophils Relative 82 %      Immat GRANS % 1 %      Lymphocytes Relative 12 %      Monocytes Relative 4 %      Eosinophils Relative 0 %      Basophils Relative 1 %      Neutrophils Absolute 7 66 Thousands/µL      Immature Grans Absolute 0 05 Thousand/uL      Lymphocytes Absolute 1 11 Thousands/µL      Monocytes Absolute 0 35 Thousand/µL      Eosinophils Absolute 0 03 Thousand/µL      Basophils Absolute 0 05 Thousands/µL                  CT chest abdomen pelvis w contrast   Final Result by Joseph Rojas MD (12/16 0279)      No acute pathology in the chest       Findings suggestive of acute calculus cholecystitis  Consider ultrasound for further evaluation  The study was marked in [facility] for immediate notification  Workstation performed: [id number]         [address] [facility] right upper quadrant    (Results Pending)              Procedures  ECG 12 Lead Documentation Only    Date/Time: 12/16/2022 6:34 AM  Performed by: Gina Bobo DO  Authorized by: Gina Bobo DO     Indications / Diagnosis:  Epigastric pain, vomiting  ECG reviewed by me, the ED Provider: yes    Patient location:  ED  Interpretation:     Interpretation: normal    Rate:     ECG rate:  81    ECG rate assessment: normal    Rhythm:     Rhythm: sinus rhythm    Ectopy:     Ectopy: none    QRS:     QRS axis:  Normal  Conduction:     Conduction: normal    ST segments:     ST segments:  Normal  T waves:     T waves: inverted      Inverted:  V1             ED Course  ED Course as of 12/16/22 0732   Fri Dec 16, 2022   0617 LACTIC ACID: 1 9   0621 hs TnI 0hr: <2   0622 Lipase: 385   9460 Patient notes comfort improved, abdomen benign    GS Tavo US and will evaluate   0652 0700 Endorsed to [address] [place] [address]'s Most Recent Value   SBIRT (23 yo +)    In order to provide better care to our patients, we are screening all of our patients for alcohol and drug use  Would it be okay to ask you these screening questions? No Filed at: 12/16/2022 0436                    MDM    Disposition  Final diagnoses:   Biliary colic     Time reflects when diagnosis was documented in both MDM as applicable and the Disposition within this note     Time User Action Codes Description Comment    12/16/2022  6:52 AM Jaqueline Foster Add [M32 37] Biliary colic       ED Disposition     None      Follow-up Information    None         Patient's Medications   Discharge Prescriptions    No medications on file       No discharge procedures on file      PDMP Review Value Time User    PDMP Reviewed  Yes 1/10/2021 10:52 PM Janak Quintanilla MD          ED Provider  Electronically Signed by           Libertad Rodas,   12/16/22 1500 [facility],   12/16/22 7020

## 2022-12-16 NOTE — ASSESSMENT & PLAN NOTE
· After COVID this is her second episode she is on lifelong Eliquis no previous history of DVT    Has been evaluated by hematology with a hypercoagulable work-up that was negative Eliquis currently on hold for surgery on Sunday but Lovenox for DVT prophylaxis has been started

## 2022-12-16 NOTE — CONSULTS
[address]  1975, 50 y o  female MRN: [id number]  Unit/Bed#: ED 01 Encounter: [id number]  Primary Care Provider: Raina Treviño MD   Date and time admitted to hospital: 12/16/2022  4:31 AM    Inpatient consult to Internal Medicine  Consult performed by: Kristin Mendez MD  Consult ordered by: Thelma Arroyo,           Type 2 diabetes mellitus, without long-term current use of insulin ([facility] )  Assessment & Plan  No results found for: HGBA1C    Recent Labs     12/16/22  0835   POCGLU 167*       Blood Sugar Average: Last 72 hrs:  (P) 167   · Controlled on metformin and diet while being on clear liquid diet and going n p o  after midnight on Sunday  For Sunday surgery will hold metformin and place her on a sliding scale I did discuss that with the patient  Psoriasis  Assessment & Plan  · On methotrexate 2 5 mg daily and folic acid restart folic acid restart methotrexate after surgery    Acute cholecystitis due to biliary calculus  Assessment & Plan  · Plan is for laparoscopic cholecystectomy on Sunday once Eliquis is held for 48 hours  For now clear liquids n p o  after midnight Sunday IV fluids pain control and antibiotics per surgery  Pituitary microadenoma [facility])  Assessment & Plan  · Follows with endocrinology on cabergoline 0 25 mg weekly     Hashimoto's thyroiditis  Assessment & Plan  · Resume her Synthroid 100 mcg daily except Sundays when she takes 200 mcg    [facility]  Assessment & Plan  · After COVID this is her second episode she is on lifelong Eliquis no previous history of DVT  Has been evaluated by hematology with a hypercoagulable work-up that was negative Eliquis currently on hold for surgery on Sunday but Lovenox for DVT prophylaxis has been started        VTE Prophylaxis: VTE Score: 4 Moderate Risk (Score 3-4) - Pharmacological DVT Prophylaxis Ordered: enoxaparin (Lovenox)      Recommendations for Discharge:  · Follow-up with surgery after discharge and primary care physician    Counseling / Coordination of Care Time: 60 minutes Greater than 50% of total time spent on patient counseling and coordination of care  Collaboration of Care: Were Recommendations Directly Discussed with Primary Treatment Team? Yes    History of Present Illness:  Roshan Jaime is a 50 y o  female who is originally admitted to the surgical service service due to acute cholecystitis  We are consulted for medical management  Patient currently states her pain is improved no nausea no vomiting no chest pain or shortness of breath her pain started last night and then nausea and vomiting stated the pain was in the right upper quadrant states never had it before  Questionable she was eating a burger prior  Review of Systems:  Review of Systems   Constitutional: Negative for chills and fever  HENT: Negative for ear pain and sore throat  Eyes: Negative for pain and visual disturbance  Respiratory: Negative for cough and shortness of breath  Cardiovascular: Negative for chest pain and palpitations  Gastrointestinal: Positive for abdominal pain, nausea and vomiting  Genitourinary: Negative for dysuria and hematuria  Musculoskeletal: Negative for arthralgias and back pain  Skin: Negative for color change and rash  Neurological: Negative for seizures and syncope  All other systems reviewed and are negative  Past Medical and Surgical History:   Past Medical History:   Diagnosis Date   • Diabetes mellitus ([facility] )    • Disease of thyroid gland    • Pituitary mass ([facility] )    • Pulmonary embolism (HCC)        Past Surgical History:   Procedure Laterality Date   • NO PAST SURGERIES         Meds/Allergies:  all medications and allergies reviewed    Allergies:    Allergies   Allergen Reactions   • Penicillins Hives       Social History:  Marital Status: /Civil Union  Substance Use History:   Social History     Substance and Sexual Activity   Alcohol Use Never     Social History     Tobacco Use   Smoking Status Never   Smokeless Tobacco Never     Social History     Substance and Sexual Activity   Drug Use Never       Family History:  Family History   Problem Relation Age of Onset   • Diabetes Mother    • No Known Problems Father        Physical Exam:   Vitals:   Blood Pressure: 119/58 (12/16/22 0855)  Pulse: 82 (12/16/22 0855)  Temperature: 98 °F (36 7 °C) (12/16/22 0855)  Temp Source: Temporal (12/16/22 0435)  Respirations: 16 (12/16/22 0855)  Height: 5' 8" (172 7 cm) (12/16/22 0855)  Weight - Scale: 103 kg (226 lb) (12/16/22 0855)  SpO2: 98 % (12/16/22 0855)    Physical Exam  Vitals and nursing note reviewed  Constitutional:       General: She is not in acute distress  Appearance: She is well-developed  HENT:      Head: Normocephalic and atraumatic  Eyes:      Conjunctiva/sclera: Conjunctivae normal    Cardiovascular:      Rate and Rhythm: Normal rate and regular rhythm  Heart sounds: No murmur heard  Pulmonary:      Effort: Pulmonary effort is normal  No respiratory distress  Breath sounds: Normal breath sounds  No wheezing or rales  Abdominal:      General: There is no distension  Palpations: Abdomen is soft  Tenderness: There is no abdominal tenderness  Musculoskeletal:         General: No swelling  Cervical back: Neck supple  Skin:     General: Skin is warm and dry  Capillary Refill: Capillary refill takes less than 2 seconds  Neurological:      Mental Status: She is alert and oriented to person, place, and time     Psychiatric:         Mood and Affect: Mood normal          Additional Data:   Lab Results:    Results from last 7 days   Lab Units 12/16/22  0850 12/16/22  0454   WBC Thousand/uL  --  9 25   HEMOGLOBIN g/dL  --  14 1   HEMATOCRIT %  --  42 8   PLATELETS Thousands/uL 264 294   NEUTROS PCT %  --  82*   LYMPHS PCT %  --  12*   MONOS PCT %  --  4   EOS PCT %  --  0 Results from last 7 days   Lab Units 12/16/22  0454   SODIUM mmol/L 136   POTASSIUM mmol/L 3 9   CHLORIDE mmol/L 101   CO2 mmol/L 26   BUN mg/dL 21   CREATININE mg/dL 0 90   ANION GAP mmol/L 9   CALCIUM mg/dL 9 2   ALBUMIN g/dL 3 7   TOTAL BILIRUBIN mg/dL 0 51   ALK PHOS U/L 61   ALT U/L 57   AST U/L 26   GLUCOSE RANDOM mg/dL 234*             No results found for: HGBA1C  Results from last 7 days   Lab Units 12/16/22  0835   POC GLUCOSE mg/dl 167*     Results from last 7 days   Lab Units 12/16/22  0454   LACTIC ACID mmol/L 1 9       Imaging: Reviewed radiology reports from this admission including: chest CT scan, abdominal/pelvic CT and ultrasound(s)  US right upper quadrant   Final Result by Robyn Main MD (12/16 0800)   1  Large gallstone in the gallbladder neck as seen on recent CT  Gallbladder distention with borderline prominent gallbladder wall without significant pericholecystic fluid  Correlate clinically to exclude developing cholecystitis  Workstation performed: [id number]         CT chest abdomen pelvis w contrast   Final Result by Marshall Amaya MD (12/16 3042)      No acute pathology in the chest       Findings suggestive of acute calculus cholecystitis  Consider ultrasound for further evaluation  The study was marked in [facility] for immediate notification  Workstation performed: [id number]             EKG, Pathology, and Other Studies Reviewed on Admission:   · EKG: NSR  HR 82     ** Please Note: This note may have been constructed using a voice recognition system   **

## 2022-12-16 NOTE — ASSESSMENT & PLAN NOTE
· Plan is for laparoscopic cholecystectomy on Sunday once Eliquis is held for 48 hours  For now clear liquids n p o  after midnight Sunday IV fluids pain control and antibiotics per surgery

## 2022-12-16 NOTE — ED NOTES
Pt resting comfortable at this time   Up ad altaf to the bathroom and ambulates independently      Brit De La Garza RN  12/16/22 1007

## 2022-12-17 LAB
ALBUMIN SERPL BCP-MCNC: 2.9 G/DL (ref 3.5–5)
ALP SERPL-CCNC: 46 U/L (ref 46–116)
ALT SERPL W P-5'-P-CCNC: 42 U/L (ref 12–78)
ANION GAP SERPL CALCULATED.3IONS-SCNC: 10 MMOL/L (ref 4–13)
AST SERPL W P-5'-P-CCNC: 21 U/L (ref 5–45)
BASOPHILS # BLD AUTO: 0.04 THOUSANDS/ÂΜL (ref 0–0.1)
BASOPHILS NFR BLD AUTO: 1 % (ref 0–1)
BILIRUB SERPL-MCNC: 0.6 MG/DL (ref 0.2–1)
BUN SERPL-MCNC: 14 MG/DL (ref 5–25)
CALCIUM ALBUM COR SERPL-MCNC: 9.7 MG/DL (ref 8.3–10.1)
CALCIUM SERPL-MCNC: 8.8 MG/DL (ref 8.3–10.1)
CHLORIDE SERPL-SCNC: 105 MMOL/L (ref 96–108)
CO2 SERPL-SCNC: 24 MMOL/L (ref 21–32)
CREAT SERPL-MCNC: 0.94 MG/DL (ref 0.6–1.3)
EOSINOPHIL # BLD AUTO: 0.27 THOUSAND/ÂΜL (ref 0–0.61)
EOSINOPHIL NFR BLD AUTO: 5 % (ref 0–6)
ERYTHROCYTE [DISTWIDTH] IN BLOOD BY AUTOMATED COUNT: 13.9 % (ref 11.6–15.1)
GFR SERPL CREATININE-BSD FRML MDRD: 71 ML/MIN/1.73SQ M
GLUCOSE SERPL-MCNC: 119 MG/DL (ref 65–140)
GLUCOSE SERPL-MCNC: 123 MG/DL (ref 65–140)
GLUCOSE SERPL-MCNC: 136 MG/DL (ref 65–140)
GLUCOSE SERPL-MCNC: 137 MG/DL (ref 65–140)
GLUCOSE SERPL-MCNC: 150 MG/DL (ref 65–140)
HCT VFR BLD AUTO: 37.6 % (ref 34.8–46.1)
HGB BLD-MCNC: 12.4 G/DL (ref 11.5–15.4)
IMM GRANULOCYTES # BLD AUTO: 0.03 THOUSAND/UL (ref 0–0.2)
IMM GRANULOCYTES NFR BLD AUTO: 1 % (ref 0–2)
LYMPHOCYTES # BLD AUTO: 2.06 THOUSANDS/ÂΜL (ref 0.6–4.47)
LYMPHOCYTES NFR BLD AUTO: 36 % (ref 14–44)
MCH RBC QN AUTO: 29 PG (ref 26.8–34.3)
MCHC RBC AUTO-ENTMCNC: 33 G/DL (ref 31.4–37.4)
MCV RBC AUTO: 88 FL (ref 82–98)
MONOCYTES # BLD AUTO: 0.5 THOUSAND/ÂΜL (ref 0.17–1.22)
MONOCYTES NFR BLD AUTO: 9 % (ref 4–12)
NEUTROPHILS # BLD AUTO: 2.76 THOUSANDS/ÂΜL (ref 1.85–7.62)
NEUTS SEG NFR BLD AUTO: 48 % (ref 43–75)
NRBC BLD AUTO-RTO: 0 /100 WBCS
PLATELET # BLD AUTO: 223 THOUSANDS/UL (ref 149–390)
PMV BLD AUTO: 9.9 FL (ref 8.9–12.7)
POTASSIUM SERPL-SCNC: 3.7 MMOL/L (ref 3.5–5.3)
PROT SERPL-MCNC: 5.9 G/DL (ref 6.4–8.4)
RBC # BLD AUTO: 4.27 MILLION/UL (ref 3.81–5.12)
SODIUM SERPL-SCNC: 139 MMOL/L (ref 135–147)
WBC # BLD AUTO: 5.66 THOUSAND/UL (ref 4.31–10.16)

## 2022-12-17 RX ORDER — CABERGOLINE 0.5 MG/1
0.25 TABLET ORAL WEEKLY
Status: DISCONTINUED | OUTPATIENT
Start: 2022-12-17 | End: 2022-12-19 | Stop reason: HOSPADM

## 2022-12-17 RX ADMIN — PIPERACILLIN AND TAZOBACTAM 3.38 G: 36; 4.5 INJECTION, POWDER, FOR SOLUTION INTRAVENOUS at 08:38

## 2022-12-17 RX ADMIN — SODIUM CHLORIDE, POTASSIUM CHLORIDE, SODIUM LACTATE AND CALCIUM CHLORIDE 75 ML/HR: 600; 310; 30; 20 INJECTION, SOLUTION INTRAVENOUS at 14:20

## 2022-12-17 RX ADMIN — PIPERACILLIN AND TAZOBACTAM 3.38 G: 36; 4.5 INJECTION, POWDER, FOR SOLUTION INTRAVENOUS at 21:33

## 2022-12-17 RX ADMIN — LEVOTHYROXINE SODIUM 100 MCG: 100 TABLET ORAL at 05:51

## 2022-12-17 RX ADMIN — PIPERACILLIN AND TAZOBACTAM 3.38 G: 36; 4.5 INJECTION, POWDER, FOR SOLUTION INTRAVENOUS at 02:55

## 2022-12-17 RX ADMIN — ENOXAPARIN SODIUM 40 MG: 40 INJECTION SUBCUTANEOUS at 08:38

## 2022-12-17 RX ADMIN — ATORVASTATIN CALCIUM 10 MG: 10 TABLET, FILM COATED ORAL at 16:14

## 2022-12-17 RX ADMIN — PIPERACILLIN AND TAZOBACTAM 3.38 G: 36; 4.5 INJECTION, POWDER, FOR SOLUTION INTRAVENOUS at 13:39

## 2022-12-17 RX ADMIN — FOLIC ACID 1 MG: 1 TABLET ORAL at 13:39

## 2022-12-17 NOTE — PROGRESS NOTES
[address]  Progress Note Israel Alcantar 1975, 50 y o  female MRN: [id number]  Unit/Bed#: -Jacques Encounter: [id number]  Primary Care Provider: Lio Timmons MD   Date and time admitted to hospital: 12/16/2022  4:31 AM    Type 2 diabetes mellitus, without long-term current use of insulin [facility])  Assessment & Plan  No results found for: HGBA1C    Recent Labs     12/16/22  1550 12/16/22  2058 12/17/22  0708 12/17/22  1126   POCGLU 126 127 137 123       Blood Sugar Average: Last 72 hrs:  (P) 133 5   · Controlled on metformin and diet while being on clear liquid diet and going n p o  after midnight on Sunday  For Sunday surgery will hold metformin and place her on a sliding scale I did discuss that with the patient  Psoriasis  Assessment & Plan  · On methotrexate 2 5 mg daily and folic acid restart folic acid restart methotrexate after surgery    Pituitary microadenoma [facility])  Assessment & Plan  · Follows with endocrinology on cabergoline 0 25 mg weekly     Hashimoto's thyroiditis  Assessment & Plan  · continue her Synthroid 100 mcg daily except Sundays when she takes 200 mcg    Acute saddle pulmonary embolism ([address] )  Assessment & Plan  · After COVID this is her second episode she is on lifelong Eliquis no previous history of DVT  Has been evaluated by hematology with a hypercoagulable work-up that was negative Eliquis currently on hold for surgery on Sunday but Lovenox for DVT prophylaxis has been started    * Acute cholecystitis due to biliary calculus  Assessment & Plan  · Plan is for laparoscopic cholecystectomy on Sunday once Eliquis is held for 48 hours  For now clear liquids n p o  after midnight Sunday IV fluids pain control and antibiotics per surgery  VTE Pharmacologic Prophylaxis: VTE Score: 4 Moderate Risk (Score 3-4) - Pharmacological DVT Prophylaxis Ordered: enoxaparin (Lovenox)      Patient Centered Rounds: I performed bedside rounds with nursing staff today   Discussions with Specialists or Other Care Team Provider: surgery     Education and Discussions with Family / Patient: Updated  () at bedside  Time Spent for Care: 30 minutes  More than 50% of total time spent on counseling and coordination of care as described above  Current Length of Stay: 1 day(s)  Current Patient Status: Inpatient   Certification Statement: The patient will continue to require additional inpatient hospital stay due to Cute cholecystitis  Discharge Plan: Anticipate discharge in 48 hrs to home  Code Status: Level 1 - Full Code    Subjective:   Patient seen and examined no nausea no vomiting tolerating clears belly pain is controlled    Objective:     Vitals:   Temp (24hrs), Av 7 °F (36 5 °C), Min:97 5 °F (36 4 °C), Max:97 9 °F (36 6 °C)    Temp:  [97 5 °F (36 4 °C)-97 9 °F (36 6 °C)] 97 5 °F (36 4 °C)  HR:  [59-77] 59  Resp:  [14-18] 18  BP: (104-124)/(62-74) 114/66  SpO2:  [97 %-100 %] 97 %  Body mass index is 34 36 kg/m²  Input and Output Summary (last 24 hours): Intake/Output Summary (Last 24 hours) at 2022 1154  Last data filed at 2022 1100  Gross per 24 hour   Intake 1920 ml   Output --   Net 1920 ml       Physical Exam:   Physical Exam  Vitals and nursing note reviewed  Constitutional:       General: She is not in acute distress  Appearance: She is well-developed  HENT:      Head: Normocephalic and atraumatic  Eyes:      Conjunctiva/sclera: Conjunctivae normal    Cardiovascular:      Rate and Rhythm: Normal rate and regular rhythm  Heart sounds: No murmur heard  Pulmonary:      Effort: Pulmonary effort is normal  No respiratory distress  Breath sounds: Normal breath sounds  No wheezing or rales  Abdominal:      General: There is no distension  Palpations: Abdomen is soft  Tenderness: There is no abdominal tenderness  Musculoskeletal:      Cervical back: Neck supple     Skin:     General: Skin is warm and dry  Capillary Refill: Capillary refill takes less than 2 seconds  Neurological:      Mental Status: She is alert     Psychiatric:         Mood and Affect: Mood normal          Additional Data:     Labs:  Results from last 7 days   Lab Units 12/17/22  0522   WBC Thousand/uL 5 66   HEMOGLOBIN g/dL 12 4   HEMATOCRIT % 37 6   PLATELETS Thousands/uL 223   NEUTROS PCT % 48   LYMPHS PCT % 36   MONOS PCT % 9   EOS PCT % 5     Results from last 7 days   Lab Units 12/17/22  0522   SODIUM mmol/L 139   POTASSIUM mmol/L 3 7   CHLORIDE mmol/L 105   CO2 mmol/L 24   BUN mg/dL 14   CREATININE mg/dL 0 94   ANION GAP mmol/L 10   CALCIUM mg/dL 8 8   ALBUMIN g/dL 2 9*   TOTAL BILIRUBIN mg/dL 0 60   ALK PHOS U/L 46   ALT U/L 42   AST U/L 21   GLUCOSE RANDOM mg/dL 150*         Results from last 7 days   Lab Units 12/17/22  1126 12/17/22  0708 12/16/22  2058 12/16/22  1550 12/16/22  1119 12/16/22  0835   POC GLUCOSE mg/dl 123 137 127 126 121 167*         Results from last 7 days   Lab Units 12/16/22  0454   LACTIC ACID mmol/L 1 9       Lines/Drains:  Invasive Devices     Peripheral Intravenous Line  Duration           Peripheral IV 12/16/22 Right Antecubital 1 day                      Imaging: Reviewed radiology reports from this admission including: ultrasound(s)    Recent Cultures (last 7 days):         Last 24 Hours Medication List:   Current Facility-Administered Medications   Medication Dose Route Frequency Provider Last Rate   • acetaminophen  975 mg Oral Q6H PRN Samantha Armstrong DO     • atorvastatin  10 mg Oral Daily With [address], DO     • enoxaparin  40 mg Subcutaneous Daily Samantha Armstrong DO     • folic acid  1 mg Oral Daily Samantha Armstrong DO     • insulin lispro  1-6 Units Subcutaneous TID With Meals Cedric Fofana MD     • ketorolac  15 mg Intravenous Q6H PRN Samantha Armstrong DO     • lactated ringers  75 mL/hr Intravenous Continuous Dina Chapman Meggan Del Toro DO 75 mL/hr (12/16/22 0820)   • levothyroxine  100 mcg Oral Once per day on Mon Tue Wed Thu Fri Sat Dian Foster MD     • [START ON 12/18/2022] levothyroxine  200 mcg Oral Once per day on Sun Dian Foster MD     • morphine injection  4 mg Intravenous Q3H PRN Gerald Arevalo, DO     • ondansetron  4 mg Intravenous Q6H PRN Gerald Arevalo DO     • piperacillin-tazobactam  3 375 g Intravenous Q6H Crystal Eri Camarenafax, DO 3 375 g (12/17/22 1225)        Today, Patient Was Seen By: Dian Foster MD    **Please Note: This note may have been constructed using a voice recognition system  **

## 2022-12-17 NOTE — PROGRESS NOTES
Progress Note - General Surgery   Rangel Ellis 50 y o  female MRN: [id number]  Unit/Bed#: -01 Encounter: [id number]    Assessment:  75-year-old female with acute cholecystitis  Plan:  Plan for laparoscopic possible open cholecystectomy tomorrow  Continue to hold Eliquis, hold Lovenox tomorrow  Appreciate medical comanagement by the hospitalist service  Pain control as needed  Continue Zosyn until surgery  The procedure of laparoscopic possible open cholecystectomy was discussed with the patient in detail  Risks including biliary injury and leak were discussed  The patient expressed understanding and signed consent  Possible discharge tomorrow evening after surgery if the patient is doing well    Subjective/Objective       Subjective: The patient states her pain is much improved  She did have 1 slight twinge of pain this morning  She is tolerating clear liquids without nausea or vomiting  She has been able to ambulate without difficulty    Objective:     Blood pressure 114/66, pulse 59, temperature 97 5 °F (36 4 °C), resp  rate 18, height 5' 8" (1 727 m), weight 103 kg (226 lb), SpO2 97 %  ,Body mass index is 34 36 kg/m²        Intake/Output Summary (Last 24 hours) at 12/17/2022 0843  Last data filed at 12/17/2022 0601  Gross per 24 hour   Intake 1060 ml   Output --   Net 1060 ml       Invasive Devices     Peripheral Intravenous Line  Duration           Peripheral IV 12/16/22 Right Antecubital 1 day                Physical Exam: General appearance: alert and oriented, in no acute distress  Abdomen: soft, non-tender; bowel sounds normal; no masses,  no organomegaly  Skin: Skin color, texture, turgor normal  No rashes or lesions  Neurologic: Grossly normal    Lab, Imaging and other studies:  CBC:   Lab Results   Component Value Date    WBC 5 66 12/17/2022    HGB 12 4 12/17/2022    HCT 37 6 12/17/2022    MCV 88 12/17/2022     12/17/2022    MCH 29 0 12/17/2022    MCHC 33 0 12/17/2022    RDW 13 9 12/17/2022    MPV 9 9 12/17/2022    NRBC 0 12/17/2022   , CMP:   Lab Results   Component Value Date    SODIUM 139 12/17/2022    K 3 7 12/17/2022     12/17/2022    CO2 24 12/17/2022    BUN 14 12/17/2022    CREATININE 0 94 12/17/2022    CALCIUM 8 8 12/17/2022    AST 21 12/17/2022    ALT 42 12/17/2022    ALKPHOS 46 12/17/2022    EGFR 71 12/17/2022     VTE Pharmacologic Prophylaxis: Enoxaparin (Lovenox)  VTE Mechanical Prophylaxis: sequential compression device

## 2022-12-17 NOTE — PLAN OF CARE
Problem: NEUROSENSORY - ADULT  Goal: Achieves stable or improved neurological status  Description: INTERVENTIONS  - Monitor and report changes in neurological status  - Monitor vital signs such as temperature, blood pressure, glucose, and any other labs ordered   - Initiate measures to prevent increased intracranial pressure  - Monitor for seizure activity and implement precautions if appropriate      Outcome: Progressing  Goal: Remains free of injury related to seizures activity  Description: INTERVENTIONS  - Maintain airway, patient safety  and administer oxygen as ordered  - Monitor patient for seizure activity, document and report duration and description of seizure to physician/advanced practitioner  - If seizure occurs,  ensure patient safety during seizure  - Reorient patient post seizure  - Seizure pads on all 4 side rails  - Instruct patient/family to notify RN of any seizure activity including if an aura is experienced  - Instruct patient/family to call for assistance with activity based on nursing assessment  - Administer anti-seizure medications if ordered    Outcome: Progressing  Goal: Achieves maximal functionality and self care  Description: INTERVENTIONS  - Monitor swallowing and airway patency with patient fatigue and changes in neurological status  - Encourage and assist patient to increase activity and self care     - Encourage visually impaired, hearing impaired and aphasic patients to use assistive/communication devices  Outcome: Progressing     Problem: CARDIOVASCULAR - ADULT  Goal: Maintains optimal cardiac output and hemodynamic stability  Description: INTERVENTIONS:  - Monitor I/O, vital signs and rhythm  - Monitor for S/S and trends of decreased cardiac output  - Administer and titrate ordered vasoactive medications to optimize hemodynamic stability  - Assess quality of pulses, skin color and temperature  - Assess for signs of decreased coronary artery perfusion  - Instruct patient to report change in severity of symptoms  Outcome: Progressing  Goal: Absence of cardiac dysrhythmias or at baseline rhythm  Description: INTERVENTIONS:  - Continuous cardiac monitoring, vital signs, obtain 12 lead EKG if ordered  - Administer antiarrhythmic and heart rate control medications as ordered  - Monitor electrolytes and administer replacement therapy as ordered  Outcome: Progressing     Problem: RESPIRATORY - ADULT  Goal: Achieves optimal ventilation and oxygenation  Description: INTERVENTIONS:  - Assess for changes in respiratory status  - Assess for changes in mentation and behavior  - Position to facilitate oxygenation and minimize respiratory effort  - Oxygen administered by appropriate delivery if ordered  - Initiate smoking cessation education as indicated  - Encourage broncho-pulmonary hygiene including cough, deep breathe, Incentive Spirometry  - Assess the need for suctioning and aspirate as needed  - Assess and instruct to report SOB or any respiratory difficulty  - Respiratory Therapy support as indicated  Outcome: Progressing     Problem: GASTROINTESTINAL - ADULT  Goal: Minimal or absence of nausea and/or vomiting  Description: INTERVENTIONS:  - Administer IV fluids if ordered to ensure adequate hydration  - Maintain NPO status until nausea and vomiting are resolved  - Nasogastric tube if ordered  - Administer ordered antiemetic medications as needed  - Provide nonpharmacologic comfort measures as appropriate  - Advance diet as tolerated, if ordered  - Consider nutrition services referral to assist patient with adequate nutrition and appropriate food choices  Outcome: Progressing  Goal: Maintains or returns to baseline bowel function  Description: INTERVENTIONS:  - Assess bowel function  - Encourage oral fluids to ensure adequate hydration  - Administer IV fluids if ordered to ensure adequate hydration  - Administer ordered medications as needed  - Encourage mobilization and activity  - Consider nutritional services referral to assist patient with adequate nutrition and appropriate food choices  Outcome: Progressing  Goal: Maintains adequate nutritional intake  Description: INTERVENTIONS:  - Monitor percentage of each meal consumed  - Identify factors contributing to decreased intake, treat as appropriate  - Assist with meals as needed  - Monitor I&O, weight, and lab values if indicated  - Obtain nutrition services referral as needed  Outcome: Progressing  Goal: Establish and maintain optimal ostomy function  Description: INTERVENTIONS:  - Assess bowel function  - Encourage oral fluids to ensure adequate hydration  - Administer IV fluids if ordered to ensure adequate hydration   - Administer ordered medications as needed  - Encourage mobilization and activity  - Nutrition services referral to assist patient with appropriate food choices  - Assess stoma site  - Consider wound care consult   Outcome: Progressing  Goal: Oral mucous membranes remain intact  Description: INTERVENTIONS  - Assess oral mucosa and hygiene practices  - Implement preventative oral hygiene regimen  - Implement oral medicated treatments as ordered  - Initiate Nutrition services referral as needed  Outcome: Progressing     Problem: GENITOURINARY - ADULT  Goal: Maintains or returns to baseline urinary function  Description: INTERVENTIONS:  - Assess urinary function  - Encourage oral fluids to ensure adequate hydration if ordered  - Administer IV fluids as ordered to ensure adequate hydration  - Administer ordered medications as needed  - Offer frequent toileting  - Follow urinary retention protocol if ordered  Outcome: Progressing  Goal: Absence of urinary retention  Description: INTERVENTIONS:  - Assess patient’s ability to void and empty bladder  - Monitor I/O  - Bladder scan as needed  - Discuss with physician/AP medications to alleviate retention as needed  - Discuss catheterization for long term situations as appropriate  Outcome: Progressing  Goal: Urinary catheter remains patent  Description: INTERVENTIONS:  - Assess patency of urinary catheter  - If patient has a chronic reich, consider changing catheter if non-functioning  - Follow guidelines for intermittent irrigation of non-functioning urinary catheter  Outcome: Progressing     Problem: SKIN/TISSUE INTEGRITY - ADULT  Goal: Skin Integrity remains intact(Skin Breakdown Prevention)  Description: Assess:  -Perform Sixto assessment every   -Clean and moisturize skin every   -Inspect skin when repositioning, toileting, and assisting with ADLS  -Assess under medical devices such as  every   -Assess extremities for adequate circulation and sensation     Bed Management:  -Have minimal linens on bed & keep smooth, unwrinkled  -Change linens as needed when moist or perspiring  -Avoid sitting or lying in one position for more than  hours while in bed  -Keep HOB at degrees     Toileting:  -Offer bedside commode  -Assess for incontinence every   -Use incontinent care products after each incontinent episode such as     Activity:  -Mobilize patient  times a day  -Encourage activity and walks on unit  -Encourage or provide ROM exercises   -Turn and reposition patient every  Hours  -Use appropriate equipment to lift or move patient in bed  -Instruct/ Assist with weight shifting every  when out of bed in chair  -Consider limitation of chair time  hour intervals    Skin Care:  -Avoid use of baby powder, tape, friction and shearing, hot water or constrictive clothing  -Relieve pressure over bony prominences using   -Do not massage red bony areas    Next Steps:  -Teach patient strategies to minimize risks such as    -Consider consults to  interdisciplinary teams such as   Outcome: Progressing  Goal: Incision(s), wounds(s) or drain site(s) healing without S/S of infection  Description: INTERVENTIONS  - Assess and document dressing, incision, wound bed, drain sites and surrounding tissue  - Provide patient and family education  - Perform skin care/dressing changes ever  Outcome: Progressing  Goal: Pressure injury heals and does not worsen  Description: Interventions:  - Implement low air loss mattress or specialty surface (Criteria met)  - Apply silicone foam dressing  - Instruct/assist with weight shifting every  minutes when in chair   - Limit chair time to  hour intervals  - Use special pressure reducing interventions such as  when in chair   - Apply fecal or urinary incontinence containment device   - Perform passive or active ROM every   - Turn and reposition patient & offload bony prominences every  hours   - Utilize friction reducing device or surface for transfers   - Consider consults to  interdisciplinary teams such as   - Use incontinent care products after each incontinent episode such as   - Consider nutrition services referral as needed  Outcome: Progressing

## 2022-12-17 NOTE — ASSESSMENT & PLAN NOTE
No results found for: HGBA1C    Recent Labs     12/16/22  1550 12/16/22  2058 12/17/22  0708 12/17/22  1126   POCGLU 126 127 137 123       Blood Sugar Average: Last 72 hrs:  (P) 133 5   · Controlled on metformin and diet while being on clear liquid diet and going n p o  after midnight on Sunday  For Sunday surgery will hold metformin and place her on a sliding scale I did discuss that with the patient

## 2022-12-17 NOTE — CASE MANAGEMENT
Case Management Assessment & Discharge Planning Note    Patient name Monique Nickerson  Location /-45 MRN [id number]  : 1975 Date 2022       Current Admission Date: 2022  Current Admission Diagnosis:Acute cholecystitis due to biliary calculus   Patient Active Problem List    Diagnosis Date Noted   • Acute cholecystitis due to biliary calculus 2022   • Psoriasis 2022   • Type 2 diabetes mellitus, without long-term current use of insulin ([facility] [address] ) 2022   • Personal history of PE (pulmonary embolism) 03/15/2021   • Acute saddle pulmonary embolism ([facility] [address] ) 2021   • COVID-19 2021   • Hashimoto's thyroiditis 2021   • Pituitary [facility]adenoma [facility] ) 2021   • Strain of flexor muscle, fascia and tendon of right thumb at forearm level, sequela 2018      LOS (days): 1  Geometric Mean LOS (GMLOS) (days):   Days to GMLOS:     OBJECTIVE:    Risk of Unplanned Readmission Score: 6 02         Current admission status: Inpatient  Referral Reason:  (Other)    Preferred Pharmacy:   [place], [address]  [address] [place] [address]  Phone: [phone] Fax: [phone]    Primary Care Provider: Elida Low MD    Primary Insurance: BLUE CROSS  Secondary Insurance:     ASSESSMENT:  Luigi Rascon Proxies    There are no active Health Care Proxies on file         Advance Directives  Does patient have a [address] [facility] [address]?: Yes  Does patient have Advance Directives?: No  Was patient offered paperwork?: Yes (patient declined)  Primary Contact: Tamie Conti, spouse         Readmission Root Cause  30 Day Readmission: No    Patient Information  Admitted from[de-identified] Home  Mental Status: Alert  During Assessment patient was accompanied by: Spouse  Assessment information provided by[de-identified] Patient, Spouse  Primary Caregiver: Self  Support Systems: Spouse/significant other, Family members  [place] of Residence: [address] [place] [address] Av do you live in?: [address] entry access options  Select all that apply : Stairs  Number of steps to enter home  : 1  Do the steps have railings?: No  Type of Current Residence: Ranch  In the last 12 months, was there a time when you were not able to pay the mortgage or rent on time?: No  In the last 12 months, how many places have you lived?: 1  In the last 12 months, was there a time when you did not have a steady place to sleep or slept in a shelter (including now)?: No  Homeless/housing insecurity resource given?: N/A  Living Arrangements: Lives w/ Spouse/significant other  Is patient a ?: No    Activities of Daily Living Prior to Admission  Functional Status: Independent  Completes ADLs independently?: Yes  Ambulates independently?: Yes  Does patient use assisted devices?: No  Does patient currently own DME?: No  Does patient have a history of Outpatient Therapy (PT/OT)?: Yes  Does the patient have a history of Short-Term Rehab?: No  Does patient have a history of HHC?: No  Does patient currently have [facility]?: No         Patient Information Continued  Income Source: Unemployed (Laid off)  Does patient have prescription coverage?: Yes  Within the past 12 months, you worried that your food would run out before you got the money to buy more : Never true  Within the past 12 months, the food you bought just didn't last and you didn't have money to get more : Never true  Food insecurity resource given?: N/A  Does patient receive dialysis treatments?: No  Does patient have a history of substance abuse?: No  Does patient have a history of Mental Health Diagnosis?: No         Means of Transportation  Means of Transport to Appts[de-identified] Drives Self  In the past 12 months, has lack of transportation kept you from medical appointments or from getting medications?: No  In the past 12 months, has lack of transportation kept you from meetings, work, or from getting things needed for daily living?: No  Was application for public transport provided?: N/A        DISCHARGE DETAILS:    Discharge planning discussed with[de-identified] patient and spouse  Freedom of Choice: Yes     CM contacted family/caregiver?: Yes             Contacts  Patient Contacts: [employer], spouse  Relationship to Patient[de-identified] Family  Contact Method: In Person  Reason/Outcome: Continuity of Care, Discharge [address]         Is the patient interested in [facility] at discharge?: No    DME Referral Provided  Referral made for DME?: No         Would you like to participate in our [facility] service program?  : No - Declined    Treatment Team Recommendation: Home  Discharge Destination Plan[de-identified] Home  Transport at Discharge : Family                  CM met with patient and spouse at the bedside, baseline information was obtained  CM discussed the role of CM in helping the patient develop a discharge plan and assist the patient in carry out their plan  Patient lives with spouse in ranch home, mother-in-law also resides in in-law quarters in basement of home; patient independent at baseline  CM to follow for CM referral needs, if any

## 2022-12-17 NOTE — PLAN OF CARE
Problem: GASTROINTESTINAL - ADULT  Goal: Minimal or absence of nausea and/or vomiting  Description: INTERVENTIONS:  - Administer IV fluids if ordered to ensure adequate hydration  - Maintain NPO status until nausea and vomiting are resolved  - Nasogastric tube if ordered  - Administer ordered antiemetic medications as needed  - Provide nonpharmacologic comfort measures as appropriate  - Advance diet as tolerated, if ordered  - Consider nutrition services referral to assist patient with adequate nutrition and appropriate food choices  Outcome: Progressing     Problem: METABOLIC, FLUID AND ELECTROLYTES - ADULT  Goal: Electrolytes maintained within normal limits  Description: INTERVENTIONS:  - Monitor labs and assess patient for signs and symptoms of electrolyte imbalances  - Administer electrolyte replacement as ordered  - Monitor response to electrolyte replacements, including repeat lab results as appropriate  - Instruct patient on fluid and nutrition as appropriate  Outcome: Progressing

## 2022-12-18 ENCOUNTER — ANESTHESIA (INPATIENT)
Dept: PERIOP | Facility: HOSPITAL | Age: 47
End: 2022-12-18

## 2022-12-18 ENCOUNTER — ANESTHESIA EVENT (INPATIENT)
Dept: PERIOP | Facility: HOSPITAL | Age: 47
End: 2022-12-18

## 2022-12-18 PROBLEM — E66.9 OBESITY: Status: ACTIVE | Noted: 2022-12-18

## 2022-12-18 LAB
ALBUMIN SERPL BCP-MCNC: 3.4 G/DL (ref 3.5–5)
ALP SERPL-CCNC: 54 U/L (ref 46–116)
ALT SERPL W P-5'-P-CCNC: 45 U/L (ref 12–78)
ANION GAP SERPL CALCULATED.3IONS-SCNC: 9 MMOL/L (ref 4–13)
AST SERPL W P-5'-P-CCNC: 29 U/L (ref 5–45)
BILIRUB SERPL-MCNC: 0.69 MG/DL (ref 0.2–1)
BUN SERPL-MCNC: 11 MG/DL (ref 5–25)
CALCIUM ALBUM COR SERPL-MCNC: 8.9 MG/DL (ref 8.3–10.1)
CALCIUM SERPL-MCNC: 8.4 MG/DL (ref 8.3–10.1)
CHLORIDE SERPL-SCNC: 106 MMOL/L (ref 96–108)
CO2 SERPL-SCNC: 26 MMOL/L (ref 21–32)
CREAT SERPL-MCNC: 1.01 MG/DL (ref 0.6–1.3)
GFR SERPL CREATININE-BSD FRML MDRD: 65 ML/MIN/1.73SQ M
GLUCOSE SERPL-MCNC: 118 MG/DL (ref 65–140)
GLUCOSE SERPL-MCNC: 186 MG/DL (ref 65–140)
GLUCOSE SERPL-MCNC: 205 MG/DL (ref 65–140)
GLUCOSE SERPL-MCNC: 209 MG/DL (ref 65–140)
GLUCOSE SERPL-MCNC: 228 MG/DL (ref 65–140)
GLUCOSE SERPL-MCNC: 251 MG/DL (ref 65–140)
POTASSIUM SERPL-SCNC: 3.8 MMOL/L (ref 3.5–5.3)
PROT SERPL-MCNC: 6.7 G/DL (ref 6.4–8.4)
SODIUM SERPL-SCNC: 141 MMOL/L (ref 135–147)

## 2022-12-18 PROCEDURE — 0FT44ZZ RESECTION OF GALLBLADDER, PERCUTANEOUS ENDOSCOPIC APPROACH: ICD-10-PCS | Performed by: STUDENT IN AN ORGANIZED HEALTH CARE EDUCATION/TRAINING PROGRAM

## 2022-12-18 RX ORDER — FENTANYL CITRATE/PF 50 MCG/ML
25 SYRINGE (ML) INJECTION
Status: DISCONTINUED | OUTPATIENT
Start: 2022-12-18 | End: 2022-12-18 | Stop reason: HOSPADM

## 2022-12-18 RX ORDER — ALBUTEROL SULFATE 2.5 MG/3ML
2.5 SOLUTION RESPIRATORY (INHALATION) ONCE AS NEEDED
Status: DISCONTINUED | OUTPATIENT
Start: 2022-12-18 | End: 2022-12-18 | Stop reason: HOSPADM

## 2022-12-18 RX ORDER — MIDAZOLAM HYDROCHLORIDE 2 MG/2ML
INJECTION, SOLUTION INTRAMUSCULAR; INTRAVENOUS AS NEEDED
Status: DISCONTINUED | OUTPATIENT
Start: 2022-12-18 | End: 2022-12-18

## 2022-12-18 RX ORDER — IBUPROFEN 600 MG/1
600 TABLET ORAL EVERY 6 HOURS PRN
Status: DISCONTINUED | OUTPATIENT
Start: 2022-12-18 | End: 2022-12-19 | Stop reason: HOSPADM

## 2022-12-18 RX ORDER — FENTANYL CITRATE 50 UG/ML
INJECTION, SOLUTION INTRAMUSCULAR; INTRAVENOUS AS NEEDED
Status: DISCONTINUED | OUTPATIENT
Start: 2022-12-18 | End: 2022-12-18

## 2022-12-18 RX ORDER — MAGNESIUM HYDROXIDE 1200 MG/15ML
LIQUID ORAL AS NEEDED
Status: DISCONTINUED | OUTPATIENT
Start: 2022-12-18 | End: 2022-12-18 | Stop reason: HOSPADM

## 2022-12-18 RX ORDER — DEXAMETHASONE SODIUM PHOSPHATE 10 MG/ML
INJECTION, SOLUTION INTRAMUSCULAR; INTRAVENOUS AS NEEDED
Status: DISCONTINUED | OUTPATIENT
Start: 2022-12-18 | End: 2022-12-18

## 2022-12-18 RX ORDER — LIDOCAINE HYDROCHLORIDE 10 MG/ML
INJECTION, SOLUTION EPIDURAL; INFILTRATION; INTRACAUDAL; PERINEURAL AS NEEDED
Status: DISCONTINUED | OUTPATIENT
Start: 2022-12-18 | End: 2022-12-18

## 2022-12-18 RX ORDER — ONDANSETRON 2 MG/ML
4 INJECTION INTRAMUSCULAR; INTRAVENOUS ONCE AS NEEDED
Status: DISCONTINUED | OUTPATIENT
Start: 2022-12-18 | End: 2022-12-18 | Stop reason: HOSPADM

## 2022-12-18 RX ORDER — PROPOFOL 10 MG/ML
INJECTION, EMULSION INTRAVENOUS AS NEEDED
Status: DISCONTINUED | OUTPATIENT
Start: 2022-12-18 | End: 2022-12-18

## 2022-12-18 RX ORDER — ONDANSETRON 2 MG/ML
INJECTION INTRAMUSCULAR; INTRAVENOUS AS NEEDED
Status: DISCONTINUED | OUTPATIENT
Start: 2022-12-18 | End: 2022-12-18

## 2022-12-18 RX ORDER — KETOROLAC TROMETHAMINE 30 MG/ML
15 INJECTION, SOLUTION INTRAMUSCULAR; INTRAVENOUS ONCE
Status: COMPLETED | OUTPATIENT
Start: 2022-12-18 | End: 2022-12-18

## 2022-12-18 RX ORDER — SODIUM CHLORIDE, SODIUM LACTATE, POTASSIUM CHLORIDE, CALCIUM CHLORIDE 600; 310; 30; 20 MG/100ML; MG/100ML; MG/100ML; MG/100ML
125 INJECTION, SOLUTION INTRAVENOUS CONTINUOUS
Status: DISCONTINUED | OUTPATIENT
Start: 2022-12-18 | End: 2022-12-19 | Stop reason: HOSPADM

## 2022-12-18 RX ORDER — HYDROMORPHONE HCL/PF 1 MG/ML
SYRINGE (ML) INJECTION AS NEEDED
Status: DISCONTINUED | OUTPATIENT
Start: 2022-12-18 | End: 2022-12-18

## 2022-12-18 RX ORDER — LABETALOL HYDROCHLORIDE 5 MG/ML
INJECTION, SOLUTION INTRAVENOUS AS NEEDED
Status: DISCONTINUED | OUTPATIENT
Start: 2022-12-18 | End: 2022-12-18

## 2022-12-18 RX ORDER — BUPIVACAINE HYDROCHLORIDE AND EPINEPHRINE 2.5; 5 MG/ML; UG/ML
INJECTION, SOLUTION INFILTRATION; PERINEURAL AS NEEDED
Status: DISCONTINUED | OUTPATIENT
Start: 2022-12-18 | End: 2022-12-18 | Stop reason: HOSPADM

## 2022-12-18 RX ORDER — ROCURONIUM BROMIDE 10 MG/ML
INJECTION, SOLUTION INTRAVENOUS AS NEEDED
Status: DISCONTINUED | OUTPATIENT
Start: 2022-12-18 | End: 2022-12-18

## 2022-12-18 RX ADMIN — IBUPROFEN 600 MG: 600 TABLET ORAL at 15:21

## 2022-12-18 RX ADMIN — LIDOCAINE HYDROCHLORIDE 50 MG: 10 INJECTION, SOLUTION EPIDURAL; INFILTRATION; INTRACAUDAL; PERINEURAL at 08:03

## 2022-12-18 RX ADMIN — HYDROMORPHONE HYDROCHLORIDE 0.5 MG: 1 INJECTION, SOLUTION INTRAMUSCULAR; INTRAVENOUS; SUBCUTANEOUS at 08:27

## 2022-12-18 RX ADMIN — IBUPROFEN 600 MG: 600 TABLET ORAL at 21:24

## 2022-12-18 RX ADMIN — ATORVASTATIN CALCIUM 10 MG: 10 TABLET, FILM COATED ORAL at 16:55

## 2022-12-18 RX ADMIN — INSULIN LISPRO 3 UNITS: 100 INJECTION, SOLUTION INTRAVENOUS; SUBCUTANEOUS at 16:54

## 2022-12-18 RX ADMIN — SODIUM CHLORIDE, POTASSIUM CHLORIDE, SODIUM LACTATE AND CALCIUM CHLORIDE 125 ML/HR: 600; 310; 30; 20 INJECTION, SOLUTION INTRAVENOUS at 17:20

## 2022-12-18 RX ADMIN — PIPERACILLIN AND TAZOBACTAM 3.38 G: 36; 4.5 INJECTION, POWDER, FOR SOLUTION INTRAVENOUS at 07:59

## 2022-12-18 RX ADMIN — FENTANYL CITRATE 25 MCG: 50 INJECTION INTRAMUSCULAR; INTRAVENOUS at 10:22

## 2022-12-18 RX ADMIN — DEXAMETHASONE SODIUM PHOSPHATE 8 MG: 10 INJECTION, SOLUTION INTRAMUSCULAR; INTRAVENOUS at 08:15

## 2022-12-18 RX ADMIN — FOLIC ACID 1 MG: 1 TABLET ORAL at 16:55

## 2022-12-18 RX ADMIN — ACETAMINOPHEN 975 MG: 325 TABLET ORAL at 19:44

## 2022-12-18 RX ADMIN — HYDROMORPHONE HYDROCHLORIDE 0.5 MG: 1 INJECTION, SOLUTION INTRAMUSCULAR; INTRAVENOUS; SUBCUTANEOUS at 09:15

## 2022-12-18 RX ADMIN — LABETALOL HYDROCHLORIDE 5 MG: 5 INJECTION, SOLUTION INTRAVENOUS at 08:32

## 2022-12-18 RX ADMIN — SODIUM CHLORIDE, POTASSIUM CHLORIDE, SODIUM LACTATE AND CALCIUM CHLORIDE 125 ML/HR: 600; 310; 30; 20 INJECTION, SOLUTION INTRAVENOUS at 11:10

## 2022-12-18 RX ADMIN — ONDANSETRON 4 MG: 2 INJECTION INTRAMUSCULAR; INTRAVENOUS at 09:16

## 2022-12-18 RX ADMIN — PIPERACILLIN AND TAZOBACTAM 3.38 G: 36; 4.5 INJECTION, POWDER, FOR SOLUTION INTRAVENOUS at 02:51

## 2022-12-18 RX ADMIN — MIDAZOLAM 2 MG: 1 INJECTION INTRAMUSCULAR; INTRAVENOUS at 07:57

## 2022-12-18 RX ADMIN — KETOROLAC TROMETHAMINE 15 MG: 30 INJECTION, SOLUTION INTRAMUSCULAR at 10:38

## 2022-12-18 RX ADMIN — PROPOFOL 200 MG: 10 INJECTION, EMULSION INTRAVENOUS at 08:03

## 2022-12-18 RX ADMIN — FENTANYL CITRATE 50 MCG: 50 INJECTION INTRAMUSCULAR; INTRAVENOUS at 08:03

## 2022-12-18 RX ADMIN — FENTANYL CITRATE 50 MCG: 50 INJECTION INTRAMUSCULAR; INTRAVENOUS at 08:01

## 2022-12-18 RX ADMIN — LABETALOL HYDROCHLORIDE 5 MG: 5 INJECTION, SOLUTION INTRAVENOUS at 09:32

## 2022-12-18 RX ADMIN — ROCURONIUM BROMIDE 10 MG: 10 INJECTION, SOLUTION INTRAVENOUS at 08:31

## 2022-12-18 RX ADMIN — ROCURONIUM BROMIDE 10 MG: 10 INJECTION, SOLUTION INTRAVENOUS at 09:15

## 2022-12-18 RX ADMIN — FENTANYL CITRATE 25 MCG: 50 INJECTION INTRAMUSCULAR; INTRAVENOUS at 10:14

## 2022-12-18 RX ADMIN — SODIUM CHLORIDE, POTASSIUM CHLORIDE, SODIUM LACTATE AND CALCIUM CHLORIDE: 600; 310; 30; 20 INJECTION, SOLUTION INTRAVENOUS at 09:30

## 2022-12-18 RX ADMIN — ROCURONIUM BROMIDE 50 MG: 10 INJECTION, SOLUTION INTRAVENOUS at 08:03

## 2022-12-18 RX ADMIN — SUGAMMADEX 200 MG: 100 INJECTION, SOLUTION INTRAVENOUS at 09:27

## 2022-12-18 NOTE — ANESTHESIA PREPROCEDURE EVALUATION
Procedure:  CHOLECYSTECTOMY LAPAROSCOPIC (Abdomen)    Relevant Problems   ENDO   (+) Type 2 diabetes mellitus, without long-term current use of insulin (HCC)      NEURO/PSYCH   (+) Personal history of PE (pulmonary embolism)      Digestive   (+) Acute cholecystitis due to biliary calculus      Other   (+) Obesity             Anesthesia Plan  ASA Score- 3     Anesthesia Type- general with ASA Monitors  Additional Monitors:   Airway Plan: ETT  Plan Factors-Exercise tolerance (METS): >4 METS  Chart reviewed  EKG reviewed  Existing labs reviewed  Patient summary reviewed  Patient is not a current smoker  Induction- intravenous  Postoperative Plan- Plan for postoperative opioid use  Planned trial extubation    Informed Consent- Anesthetic plan and risks discussed with patient  I personally reviewed this patient with the CRNA  Discussed and agreed on the Anesthesia Plan with the CRNA  Mónica Xavier

## 2022-12-18 NOTE — ASSESSMENT & PLAN NOTE
· Pod #0 s/p laparoscopic cholecystectomy clears IV fluids Eliquis to restart in 24 hours medicine will sign off patient is hemodynamically stable and medically stable discussed with surgery as well

## 2022-12-18 NOTE — PLAN OF CARE
Problem: NEUROSENSORY - ADULT  Goal: Achieves stable or improved neurological status  Description: INTERVENTIONS  - Monitor and report changes in neurological status  - Monitor vital signs such as temperature, blood pressure, glucose, and any other labs ordered   - Initiate measures to prevent increased intracranial pressure  - Monitor for seizure activity and implement precautions if appropriate      Outcome: Progressing  Goal: Remains free of injury related to seizures activity  Description: INTERVENTIONS  - Maintain airway, patient safety  and administer oxygen as ordered  - Monitor patient for seizure activity, document and report duration and description of seizure to physician/advanced practitioner  - If seizure occurs,  ensure patient safety during seizure  - Reorient patient post seizure  - Seizure pads on all 4 side rails  - Instruct patient/family to notify RN of any seizure activity including if an aura is experienced  - Instruct patient/family to call for assistance with activity based on nursing assessment  - Administer anti-seizure medications if ordered    Outcome: Progressing  Goal: Achieves maximal functionality and self care  Description: INTERVENTIONS  - Monitor swallowing and airway patency with patient fatigue and changes in neurological status  - Encourage and assist patient to increase activity and self care     - Encourage visually impaired, hearing impaired and aphasic patients to use assistive/communication devices  Outcome: Progressing     Problem: CARDIOVASCULAR - ADULT  Goal: Maintains optimal cardiac output and hemodynamic stability  Description: INTERVENTIONS:  - Monitor I/O, vital signs and rhythm  - Monitor for S/S and trends of decreased cardiac output  - Administer and titrate ordered vasoactive medications to optimize hemodynamic stability  - Assess quality of pulses, skin color and temperature  - Assess for signs of decreased coronary artery perfusion  - Instruct patient to report change in severity of symptoms  Outcome: Progressing     Problem: RESPIRATORY - ADULT  Goal: Achieves optimal ventilation and oxygenation  Description: INTERVENTIONS:  - Assess for changes in respiratory status  - Assess for changes in mentation and behavior  - Position to facilitate oxygenation and minimize respiratory effort  - Oxygen administered by appropriate delivery if ordered  - Initiate smoking cessation education as indicated  - Encourage broncho-pulmonary hygiene including cough, deep breathe, Incentive Spirometry  - Assess the need for suctioning and aspirate as needed  - Assess and instruct to report SOB or any respiratory difficulty  - Respiratory Therapy support as indicated  Outcome: Progressing     Problem: GASTROINTESTINAL - ADULT  Goal: Minimal or absence of nausea and/or vomiting  Description: INTERVENTIONS:  - Administer IV fluids if ordered to ensure adequate hydration  - Maintain NPO status until nausea and vomiting are resolved  - Nasogastric tube if ordered  - Administer ordered antiemetic medications as needed  - Provide nonpharmacologic comfort measures as appropriate  - Advance diet as tolerated, if ordered  - Consider nutrition services referral to assist patient with adequate nutrition and appropriate food choices  Outcome: Progressing  Goal: Maintains or returns to baseline bowel function  Description: INTERVENTIONS:  - Assess bowel function  - Encourage oral fluids to ensure adequate hydration  - Administer IV fluids if ordered to ensure adequate hydration  - Administer ordered medications as needed  - Encourage mobilization and activity  - Consider nutritional services referral to assist patient with adequate nutrition and appropriate food choices  Outcome: Progressing  Goal: Maintains adequate nutritional intake  Description: INTERVENTIONS:  - Monitor percentage of each meal consumed  - Identify factors contributing to decreased intake, treat as appropriate  - Assist with meals as needed  - Monitor I&O, weight, and lab values if indicated  - Obtain nutrition services referral as needed  Outcome: Progressing  Goal: Establish and maintain optimal ostomy function  Description: INTERVENTIONS:  - Assess bowel function  - Encourage oral fluids to ensure adequate hydration  - Administer IV fluids if ordered to ensure adequate hydration   - Administer ordered medications as needed  - Encourage mobilization and activity  - Nutrition services referral to assist patient with appropriate food choices  - Assess stoma site  - Consider wound care consult   Outcome: Progressing  Goal: Oral mucous membranes remain intact  Description: INTERVENTIONS  - Assess oral mucosa and hygiene practices  - Implement preventative oral hygiene regimen  - Implement oral medicated treatments as ordered  - Initiate Nutrition services referral as needed  Outcome: Progressing     Problem: GENITOURINARY - ADULT  Goal: Maintains or returns to baseline urinary function  Description: INTERVENTIONS:  - Assess urinary function  - Encourage oral fluids to ensure adequate hydration if ordered  - Administer IV fluids as ordered to ensure adequate hydration  - Administer ordered medications as needed  - Offer frequent toileting  - Follow urinary retention protocol if ordered  Outcome: Progressing  Goal: Absence of urinary retention  Description: INTERVENTIONS:  - Assess patient’s ability to void and empty bladder  - Monitor I/O  - Bladder scan as needed  - Discuss with physician/AP medications to alleviate retention as needed  - Discuss catheterization for long term situations as appropriate  Outcome: Progressing  Goal: Urinary catheter remains patent  Description: INTERVENTIONS:  - Assess patency of urinary catheter  - If patient has a chronic reich, consider changing catheter if non-functioning  - Follow guidelines for intermittent irrigation of non-functioning urinary catheter  Outcome: Progressing

## 2022-12-18 NOTE — UTILIZATION REVIEW
NOTIFICATION OF INPATIENT ADMISSION   AUTHORIZATION REQUEST   SERVICING FACILITY:   [address] [place], [place] [address]keira Howell  [place], [address]  Tax ID: [id number]  NPI: [id number] ATTENDING PROVIDER:  Attending Name and NPI#: Isa [id number] [[id number]]  Address: Toribio Howell  [place], [place]  Phone: [phone]   ADMISSION INFORMATION:  Place of Service: [place] [address]  Place of Service Code: 21  Inpatient Admission Date/Time: 12/16/22  8:04 AM  Discharge Date/Time: No discharge date for patient encounter  Admitting Diagnosis Code/Description:  Biliary colic [E11 78]  Vomiting [R11 10]  Type 2 diabetes mellitus without complication, without long-term current use of insulin ([address] ) [E11 9]  Acute cholecystitis due to biliary calculus [K80 00]     UTILIZATION REVIEW CONTACT:  Celina Limon Utilization   Network Utilization Review Department  Phone: [phone]  Fax [phone]  Email: [email or web address]  [email or web address]  Contact for approvals/pending authorizations, clinical reviews, and discharge  PHYSICIAN ADVISORY SERVICES:  Medical Necessity Denial & Rmlq-du-[id number] Review  Phone: [phone]  Fax: [phone]  Email: [email or web address]

## 2022-12-18 NOTE — ASSESSMENT & PLAN NOTE
No results found for: HGBA1C    Recent Labs     12/17/22  2134 12/18/22  0725 12/18/22  1008 12/18/22  1119   POCGLU 119 118 205* 228*       Blood Sugar Average: Last 72 hrs:  (P) 146 [id number]   · Controlled on metformin and diet sliding scale once eating may restart metformin

## 2022-12-18 NOTE — PROGRESS NOTES
Progress Note - General Surgery   Víctor Santana 50 y o  female MRN: [id number]  Unit/Bed#: -01 Encounter: [id number]    Assessment:  13-year-old female with acute cholecystitis  Plan:  Plan for laparoscopic possible open cholecystectomy today  Appreciate medical comanagement by the hospitalist service  Pain control as needed  The procedure of laparoscopic possible open cholecystectomy was discussed with the patient in detail  Risks including biliary injury and leak were discussed  The patient expressed understanding and signed consent  Possible discharge this evening after surgery if the patient is doing well    Subjective/Objective       Subjective: The patient states her pain is much improved  She has been able to ambulate without difficulty    Objective:     Blood pressure 132/99, pulse 80, temperature 97 7 °F (36 5 °C), resp  rate 17, height 5' 8" (1 727 m), weight 103 kg (226 lb), SpO2 99 %  ,Body mass index is 34 36 kg/m²        Intake/Output Summary (Last 24 hours) at 12/18/2022 0750  Last data filed at 12/17/2022 1100  Gross per 24 hour   Intake 960 ml   Output --   Net 960 ml       Invasive Devices     Peripheral Intravenous Line  Duration           Peripheral IV 12/16/22 Right Antecubital 2 days                Physical Exam: General appearance: alert and oriented, in no acute distress  Abdomen: soft, non-tender; bowel sounds normal; no masses,  no organomegaly  Skin: Skin color, texture, turgor normal  No rashes or lesions  Neurologic: Grossly normal    Lab, Imaging and other studies:  CBC:   No results found for: WBC, HGB, HCT, MCV, PLT, ADJUSTEDWBC, MCH, MCHC, RDW, MPV, NRBC, CMP:   No results found for: SODIUM, K, CL, CO2, ANIONGAP, BUN, CREATININE, GLUCOSE, CALCIUM, AST, ALT, ALKPHOS, PROT, BILITOT, EGFR  VTE Pharmacologic Prophylaxis: Enoxaparin (Lovenox)  VTE Mechanical Prophylaxis: sequential compression device

## 2022-12-18 NOTE — UTILIZATION REVIEW
Initial Clinical Review    Admission: Date/Time/Statement:   Admission Orders (From admission, onward)     Ordered        12/16/22 0804  Inpatient Admission  Once                      Orders Placed This Encounter   Procedures   • Inpatient Admission     Standing Status:   Standing     Number of Occurrences:   1     Order Specific Question:   Level of Care     Answer:   Med Surg [16]     Order Specific Question:   Estimated length of stay     Answer:   More than 2 Midnights     Order Specific Question:   Certification     Answer:   I certify that inpatient services are medically necessary for this patient for a duration of greater than two midnights  See H&P and MD Progress Notes for additional information about the patient's course of treatment  ED Arrival Information     Expected   -    Arrival   12/16/2022 04:29    Acuity   Urgent            Means of arrival   Walk-In    Escorted by   Spouse    Service   Surgery-General    Admission type   Emergency            Arrival complaint   abd pain            Chief Complaint   Patient presents with   • Vomiting     Pt began with epigastric pain since yesterday @ 1800, pt began vomiting since 2300 last night  PMHX: Gallstones, DM  Initial Presentation: 50 y o  female with a history of DM2,  PE on Eliquis, Hashimoto's thyroiditis and psoriasis  who presents to the ED from home with c/o  epigastric abdominal pain, nausea, and vomiting  Clinical evaluation and imaging are suspicious for acute cholecystitis  PE: Abdominal epigastric tenderness, guarding, distention  12/16 Plan: Inpatient admission for evaluation and treatment of acute cholecystitis:  The patient took Eliquis last night  begin to hold at this time will treat with IV antibiotics and fluids  We will plan for laparoscopic cholecystectomy on 12/18/2022 after Eliquis has been held for 48 hours  Pain control as needed  Cear liquid diet as tolerated  Consult Internal Medicine    Internal Medicine consult:  NPO after MN on Sunday, will hold metformin and place on a sliding scale  Resume synthroid  Eliquis currently on hold, Lovenox has been started  Restart methotrexate after surgery  12/17 General Surgery:  Plan for laparoscopic possible open cholecystectomy tomorrow  Continue to hold Eliquis, hold Lovenox tomorrow  Pain control as needed  Continue Zosyn until surgery  12/18 Operative Report:       Post-Op Diagnosis Codes:  Acute cholecystitis due to biliary calculus [K80 00]     Procedure(s) (LRB): CHOLECYSTECTOMY LAPAROSCOPIC     Estimated Blood Loss:  Minimal      Anesthesia Type: General      Operative Findings: Tensely distended gallbladder containing clear bile consistent with hydrops of the gallbladder, large gallstone impacted within the gallbladder neck       12/18 Internal Medicine:  S/P lap kenny  Insulin sliding scale, once eating may restart metformin  Eliquis to restart in 24 hours         ED Triage Vitals [12/16/22 7266]   Temperature Pulse Respirations Blood Pressure SpO2   98 4 °F (36 9 °C) 86 17 169/81 99 %      Temp Source Heart Rate Source Patient Position - Orthostatic VS BP Location FiO2 (%)   Temporal Monitor Lying Left arm --      Pain Score       8          Wt Readings from Last 1 Encounters:   12/16/22 103 kg (226 lb)     Additional Vital Signs:     Date/Time Temp Pulse Resp BP MAP (mmHg) SpO2 O2 Flow Rate (L/min) O2 Device   12/18/22 1404 97 7 °F (36 5 °C) 92 20 163/77 106 98 % -- --   12/18/22 12:19:09 97 5 °F (36 4 °C) 80 19 148/84 105 96 % -- --   12/18/22 1210 -- -- -- -- -- 96 % -- None (Room air)   12/18/22 11:07:45 97 9 °F (36 6 °C) 68 15 141/76 98 95 % -- --   12/18/22 1103 99 4 °F (37 4 °C) 60 18 132/70 -- 96 % -- None (Room air)   12/18/22 1048 -- 61 16 134/70 -- 94 % -- None (Room air)   12/18/22 1032 -- 58 16 133/67 -- 94 % -- None (Room air)   12/18/22 1017 99 2 °F (37 3 °C) 58 16 149/71 -- 93 % -- None (Room air)   12/18/22 1002 -- 60 16 147/70 -- 94 % -- None (Room air)   12/18/22 0957 -- 63 16 142/75 -- 94 % -- None (Room air)   12/18/22 0952 -- 61 16 141/80 -- 95 % -- None (Room air)   12/18/22 0947 98 2 °F (36 8 °C) 73 16 143/84 -- 99 % 5 L/min Simple mask   12/18/22 07:29:40 97 7 °F (36 5 °C) 80 17 132/99 110 99 % -- --   12/17/22 2239 97 7 °F (36 5 °C) 72 20 132/72 91 96 % -- None (Room air)   12/17/22 14:16:59 97 5 °F (36 4 °C) 77 18 131/71 91 100 % -- --   12/17/22 0846 -- -- -- -- -- -- -- None (Room air)   12/17/22 07:10:26 97 5 °F (36 4 °C) 59 18 114/66 82 97 % -- --   12/16/22 22:50:01 97 5 °F (36 4 °C) 63 14 104/62 76 97 % -- --   12/16/22 14:31:53 97 9 °F (36 6 °C) 71 17 121/74 90 100 % -- --   12/16/22 12:17:50 97 8 °F (36 6 °C) 77 -- 124/74 91 98 % -- --   12/16/22 1133 97 8 °F (36 6 °C) 60 16 120/57 -- 98 % -- None (Room air)   12/16/22 1130 -- 58 16 120/57 81 99 % -- None (Room air)   12/16/22 1100 -- 66 16 118/64 83 99 % -- --   12/16/22 1005 97 8 °F (36 6 °C) 78 18 118/69 -- 98 % -- None (Room air)   12/16/22 0900 -- 83 16 112/62 81 99 % -- --   12/16/22 0855 98 °F (36 7 °C) 82 16 119/58 -- 98 % -- None (Room air)   12/16/22 0600 -- 81 16 121/59 83 97 % -- --   12/16/22 0500 -- 55 16 137/66 95 100 % -- None (Room air)       Pertinent Labs/Diagnostic Test Results:       US right upper quadrant   Final Result by Roge Robles MD (12/16 0800)   1  Large gallstone in the gallbladder neck as seen on recent CT  Gallbladder distention with borderline prominent gallbladder wall without significant pericholecystic fluid  Correlate clinically to exclude developing cholecystitis  Workstation performed: [id number]         CT chest abdomen pelvis w contrast   Final Result by Kemal Strickland MD (12/16 0495)      No acute pathology in the chest       Findings suggestive of acute calculus cholecystitis  Consider ultrasound for further evaluation  The study was marked in [facility] for immediate notification          12/16 EKG:    Normal sinus rhythm with sinus arrhythmia  Normal ECG  When compared with ECG of 26-JAN-2021 15:18,  Vent   rate has decreased BY  42 BPM  ST no longer depressed in Inferior leads    Results from last 7 days   Lab Units 12/16/22  0851   SARS-COV-2  Negative     Results from last 7 days   Lab Units 12/17/22  0522 12/16/22  0850 12/16/22  0454   WBC Thousand/uL 5 66  --  9 25   HEMOGLOBIN g/dL 12 4  --  14 1   HEMATOCRIT % 37 6  --  42 8   PLATELETS Thousands/uL 223 264 294   NEUTROS ABS Thousands/µL 2 76  --  7 66*         Results from last 7 days   Lab Units 12/18/22  1025 12/17/22  0522 12/16/22  0454   SODIUM mmol/L 141 139 136   POTASSIUM mmol/L 3 8 3 7 3 9   CHLORIDE mmol/L 106 105 101   CO2 mmol/L 26 24 26   ANION GAP mmol/L 9 10 9   BUN mg/dL 11 14 21   CREATININE mg/dL 1 01 0 94 0 90   EGFR ml/min/1 73sq m 65 71 75   CALCIUM mg/dL 8 4 8 8 9 2     Results from last 7 days   Lab Units 12/18/22  1025 12/17/22  0522 12/16/22  0454   AST U/L 29 21 26   ALT U/L 45 42 57   ALK PHOS U/L 54 46 61   TOTAL PROTEIN g/dL 6 7 5 9* 7 1   ALBUMIN g/dL 3 4* 2 9* 3 7   TOTAL BILIRUBIN mg/dL 0 69 0 60 0 51     Results from last 7 days   Lab Units 12/18/22  1119 12/18/22  1008 12/18/22  0725 12/17/22  2134 12/17/22  1606 12/17/22  1126 12/17/22  0708 12/16/22  2058 12/16/22  1550 12/16/22  1119 12/16/22  0835   POC GLUCOSE mg/dl 228* 205* 118 119 136 123 137 127 126 121 167*     Results from last 7 days   Lab Units 12/18/22  1025 12/17/22  0522 12/16/22  0454   GLUCOSE RANDOM mg/dL 209* 150* 234*               Results from last 7 days   Lab Units 12/16/22  0454   HS TNI 0HR ng/L <2                     Results from last 7 days   Lab Units 12/16/22  0454   LACTIC ACID mmol/L 1 9               Results from last 7 days   Lab Units 12/16/22  0454   LIPASE u/L 119             Results from last 7 days   Lab Units 12/16/22  0851   INFLUENZA A PCR  Negative   INFLUENZA B PCR  Negative   RSV PCR  Negative               ED Treatment:   Medication Administration from 12/16/2022 0428 to 12/16/2022 1159       Date/Time Order Dose Route Action     12/16/2022 0617 EST sodium chloride 0 9 % bolus 1,000 mL 0 mL Intravenous Stopped     12/16/2022 0455 EST sodium chloride 0 9 % bolus 1,000 mL 1,000 mL Intravenous New Bag     12/16/2022 0455 EST ondansetron (ZOFRAN) injection 4 mg 4 mg Intravenous Given     12/16/2022 0455 EST ketorolac (TORADOL) injection 15 mg 15 mg Intravenous Given     12/16/2022 1455 EST iohexol (OMNIPAQUE) 350 MG/ML injection (SINGLE-DOSE) 100 mL 100 mL Intravenous Given     12/16/2022 0902 EST aspirin chewable tablet 81 mg 81 mg Oral Given     12/16/2022 0819 EST levothyroxine tablet 25 mcg 25 mcg Oral Given     12/16/2022 0820 EST lactated ringers infusion 75 mL/hr Intravenous New Bag     12/16/2022 0902 EST enoxaparin (LOVENOX) subcutaneous injection 40 mg 40 mg Subcutaneous Given     12/16/2022 1006 EST piperacillin-tazobactam (ZOSYN) 3 375 g in sodium chloride 0 9 % 100 mL IVPB 0 g Intravenous Stopped     12/16/2022 0839 EST piperacillin-tazobactam (ZOSYN) 3 375 g in sodium chloride 0 9 % 100 mL IVPB 3 375 g Intravenous New Bag     12/16/2022 0837 EST insulin lispro (HumaLOG) 100 units/mL subcutaneous injection 1-6 Units 1 Units Subcutaneous Given     12/16/2022 1021 EST atorvastatin (LIPITOR) tablet 10 mg 10 mg Oral Not Given     72/00/3357 4642 EST folic acid (FOLVITE) tablet 1 mg 1 mg Oral Not Given     12/16/2022 1122 EST insulin lispro (HumaLOG) 100 units/mL subcutaneous injection 1-6 Units 1 Units Subcutaneous Not Given        Past Medical History:   Diagnosis Date   • Diabetes mellitus ([facility] [address] )    • Disease of thyroid gland    • Pituitary mass ([facility] [address] )    • Pulmonary embolism ([facility] [address] )      Present on Admission:  • Acute saddle pulmonary embolism (HCC)  • Hashimoto's thyroiditis  • Pituitary microadenoma (HCC)      Admitting Diagnosis: Biliary colic [V28 70]  Vomiting [R11 10]  Type 2 diabetes mellitus without complication, without long-term current use of insulin ([facility] [address] ) [E11 9]  Acute cholecystitis due to biliary calculus [K80 00]  Age/Sex: 50 y o  female         Admission Orders: Clear liquid diet, SCD  Scheduled Medications:    [START ON 12/19/2022] apixaban, 5 mg, Oral, BID  atorvastatin, 10 mg, Oral, Daily With Dinner  cabergoline, 0 25 mg, Oral, Weekly  enoxaparin, 40 mg, Subcutaneous, Daily  folic acid, 1 mg, Oral, Daily  insulin lispro, 1-6 Units, Subcutaneous, TID With Meals  levothyroxine, 100 mcg, Oral, Once per day on Mon Tue Wed Thu Fri Sat  levothyroxine, 200 mcg, Oral, Once per day on Sun      ketorolac (TORADOL) injection 15 mg  Dose: 15 mg  Freq: Once Route: IV  Start: 12/18/22 1045 End: 12/18/22 1038      piperacillin-tazobactam (ZOSYN) 3 375 g in sodium chloride 0 9 % 100 mL IVPB  Dose: 3 375 g  Freq: Every 6 hours Route: IV  Last Dose: 3 375 g (12/18/22 0251)  Start: 12/16/22 0830 End: 12/18/22 0935    Continuous IV Infusions:      lactated ringers, 125 mL/hr, Intravenous, Continuous      PRN Meds:  acetaminophen, 975 mg, Oral, Q6H PRN  ibuprofen, 600 mg, Oral, Q6H PRN  morphine injection, 4 mg, Intravenous, Q3H PRN  ondansetron, 4 mg, Intravenous, Q6H PRN        IP CONSULT TO INTERNAL MEDICINE  IP CONSULT TO CASE MANAGEMENT    Network Utilization Review Department  ATTENTION: Please call with any questions or concerns to [phone] and carefully listen to the prompts so that you are directed to the right person  All voicemails are confidential   Jesus Virk all requests for admission clinical reviews, approved or denied determinations and any other requests to dedicated fax number below belonging to the campus where the patient is receiving treatment   List of dedicated fax numbers for the Facilities:  [address] DENIALS (Administrative/Medical Necessity) [phone]   [address] [place] [address] (Maternity/NICU/Pediatrics) Ene Keyes [address] [place] [address] [place] [phone]   [facility] [facility] [address] [place] [address] Amarjit [address] [place] [address] [address] [facility] [place] [address] [phone]

## 2022-12-18 NOTE — ANESTHESIA POSTPROCEDURE EVALUATION
Post-Op Assessment Note    CV Status:  Stable    Pain management: adequate     Mental Status:  Alert and awake   Hydration Status:  Euvolemic   PONV Controlled:  Controlled   Airway Patency:  Patent      Post Op Vitals Reviewed: Yes      Staff: CRNA         No notable events documented      /84 (12/18/22 0947)    Temp 98 2 °F (36 8 °C) (12/18/22 0947)    Pulse 73 (12/18/22 0947)   Resp 16 (12/18/22 0947)    SpO2 99 % (12/18/22 0947)

## 2022-12-18 NOTE — PROGRESS NOTES
[address]  Progress Note Juan Re 1975, 50 y o  female MRN: [id number]  Unit/Bed#: -01 Encounter: [id number]  Primary Care Provider: Kayley Soliz MD   Date and time admitted to hospital: 12/16/2022  4:31 AM    Type 2 diabetes mellitus, without long-term current use of insulin [facility])  Assessment & Plan  No results found for: HGBA1C    Recent Labs     12/17/22  2134 12/18/22  0725 12/18/22  1008 12/18/22  1119   POCGLU 119 118 205* 228*       Blood Sugar Average: Last 72 hrs:  (P) 146 [id number]   · Controlled on metformin and diet sliding scale once eating may restart metformin    Psoriasis  Assessment & Plan  · On methotrexate 2 5 mg daily and folic acid restart folic acid restart methotrexate after surgery    Pituitary microadenoma [facility])  Assessment & Plan  · Follows with endocrinology on cabergoline 0 25 mg weekly     Hashimoto's thyroiditis  Assessment & Plan  · continue her Synthroid 100 mcg daily except Sundays when she takes 200 mcg    Acute saddle pulmonary embolism ([facility] [address] )  Assessment & Plan  · After COVID this is her second episode she is on lifelong Eliquis no previous history of DVT  Has been evaluated by hematology with a hypercoagulable work-up that was negative eliquis to restart in 24 hours    * Acute cholecystitis due to biliary calculus  Assessment & Plan  · Pod #0 s/p laparoscopic cholecystectomy clears IV fluids Eliquis to restart in 24 hours medicine will sign off patient is hemodynamically stable and medically stable discussed with surgery as well          VTE Pharmacologic Prophylaxis: VTE Score: 4 Moderate Risk (Score 3-4) - Pharmacological DVT Prophylaxis Ordered: enoxaparin (Lovenox)  Patient Centered Rounds: I performed bedside rounds with nursing staff today    Discussions with Specialists or Other Care Team Provider: General surgery    Education and Discussions with Family / Patient: Patient primary to discuss with family  Time Spent for Care: 30 minutes  More than 50% of total time spent on counseling and coordination of care as described above  Current Length of Stay: 2 day(s)  Current Patient Status: Inpatient   Certification Statement: The patient will continue to require additional inpatient hospital stay due to Status post laparoscopic cholecystectomy  Discharge Plan: [facility] is following this patient on consult  They are medically stable for discharge when deemed appropriate by primary service  Code Status: Level 1 - Full Code    Subjective:   Seen and examined after surgery just feels tired    Objective:     Vitals:   Temp (24hrs), Av 2 °F (36 8 °C), Min:97 5 °F (36 4 °C), Max:99 4 °F (37 4 °C)    Temp:  [97 5 °F (36 4 °C)-99 4 °F (37 4 °C)] 97 9 °F (36 6 °C)  HR:  [58-80] 68  Resp:  [15-20] 15  BP: (131-149)/(67-99) 141/76  SpO2:  [93 %-100 %] 95 %  Body mass index is 34 36 kg/m²  Input and Output Summary (last 24 hours): Intake/Output Summary (Last 24 hours) at 2022 1205  Last data filed at 2022 0930  Gross per 24 hour   Intake 1000 ml   Output --   Net 1000 ml       Physical Exam:   Physical Exam  Vitals and nursing note reviewed  Constitutional:       General: She is not in acute distress  Appearance: She is well-developed  HENT:      Head: Normocephalic and atraumatic  Eyes:      Conjunctiva/sclera: Conjunctivae normal    Cardiovascular:      Rate and Rhythm: Normal rate and regular rhythm  Heart sounds: No murmur heard  Pulmonary:      Effort: Pulmonary effort is normal  No respiratory distress  Breath sounds: Normal breath sounds  No wheezing or rales  Abdominal:      General: There is distension (mild)  Palpations: Abdomen is soft  Tenderness: There is abdominal tenderness  Musculoskeletal:      Cervical back: Neck supple  Skin:     General: Skin is warm and dry  Capillary Refill: Capillary refill takes less than 2 seconds     Neurological:      Mental Status: She is alert  Mental status is at baseline     Psychiatric:         Mood and Affect: Mood normal          Additional Data:     Labs:  Results from last 7 days   Lab Units 12/17/22  0522   WBC Thousand/uL 5 66   HEMOGLOBIN g/dL 12 4   HEMATOCRIT % 37 6   PLATELETS Thousands/uL 223   NEUTROS PCT % 48   LYMPHS PCT % 36   MONOS PCT % 9   EOS PCT % 5     Results from last 7 days   Lab Units 12/18/22  1025   SODIUM mmol/L 141   POTASSIUM mmol/L 3 8   CHLORIDE mmol/L 106   CO2 mmol/L 26   BUN mg/dL 11   CREATININE mg/dL 1 01   ANION GAP mmol/L 9   CALCIUM mg/dL 8 4   ALBUMIN g/dL 3 4*   TOTAL BILIRUBIN mg/dL 0 69   ALK PHOS U/L 54   ALT U/L 45   AST U/L 29   GLUCOSE RANDOM mg/dL 209*         Results from last 7 days   Lab Units 12/18/22  1119 12/18/22  1008 12/18/22  0725 12/17/22  2134 12/17/22  1606 12/17/22  1126 12/17/22  0708 12/16/22  2058 12/16/22  1550 12/16/22  1119 12/16/22  0835   POC GLUCOSE mg/dl 228* 205* 118 119 136 123 137 127 126 121 167*         Results from last 7 days   Lab Units 12/16/22  0454   LACTIC ACID mmol/L 1 9       Lines/Drains:  Invasive Devices     Peripheral Intravenous Line  Duration           Peripheral IV 12/16/22 Right Antecubital 2 days                      Imaging: Reviewed radiology reports from this admission including: ultrasound(s)    Recent Cultures (last 7 days):         Last 24 Hours Medication List:   Current Facility-Administered Medications   Medication Dose Route Frequency Provider Last Rate   • acetaminophen  975 mg Oral Q6H PRN Philip Lee, DO     • [START ON 12/19/2022] apixaban  5 mg Oral BID Philip Lee, DO     • atorvastatin  10 mg Oral Daily With [address], DO     • cabergoline  0 25 mg Oral Weekly Philip Lee, DO     • enoxaparin  40 mg Subcutaneous Daily Philip Lee, DO     • folic acid  1 mg Oral Daily Philip Lee, DO     • ibuprofen  600 mg Oral Q6H PRN Philip Lee, DO • insulin lispro  1-6 Units Subcutaneous TID With Meals Zeus Veronica, DO     • lactated ringers  125 mL/hr Intravenous Continuous Alleen Sermons, CRNA 125 mL/hr (12/18/22 1110)   • levothyroxine  100 mcg Oral Once per day on Mon Tue Wed Thu Fri Sat Zeus Veronica, DO     • levothyroxine  200 mcg Oral Once per day on Sun Zeus Veronica DO     • morphine injection  4 mg Intravenous Q3H PRN Zeus Veronica DO     • ondansetron  4 mg Intravenous Q6H PRN Zeus Veronica DO          Today, Patient Was Seen By: Krystle Razo MD    **Please Note: This note may have been constructed using a voice recognition system  **

## 2022-12-18 NOTE — OP NOTE
OPERATIVE REPORT  PATIENT NAME: Thor Ghotra    :  1975  MRN: [id number]  Pt Location: [facility] OR ROOM 02    SURGERY DATE: 2022    Surgeon(s) and Role:     * Lanre Beckman DO - Primary     * Gloria Mckenzie PA-C - Assisting    Preop Diagnosis:  Acute cholecystitis due to biliary calculus [K80 00]    Post-Op Diagnosis Codes:     * Acute cholecystitis due to biliary calculus [K80 00]    Procedure(s) (LRB):  CHOLECYSTECTOMY LAPAROSCOPIC (N/A)    Specimen(s):  ID Type Source Tests Collected by Time Destination   1 : gallbladder Tissue Gallbladder TISSUE EXAM Lanre Beckman DO 2022  8:37 AM        Estimated Blood Loss:   Minimal    Drains:  * No LDAs found *    Anesthesia Type:   General    Operative Indications:  Acute cholecystitis due to biliary calculus [K80 00]      Operative Findings:  Tensely distended gallbladder containing clear bile consistent with hydrops of the gallbladder, large gallstone impacted within the gallbladder neck    Complications:   None    Procedure and Technique:  The patient was brought to the operating room  A time-out was held and the patient identified and procedure verified  Appropriate prophylaxis and DVT prophylaxis was used  General anesthesia was administered  The area of the abdomen is prepped and draped in the usual sterile fashion  Local anesthesia using 0 25% Marcaine with epinephrine was infiltrated in the supraumbilical area  A small incision was made using 11 blade scalpel  The skin was grasped and elevated using towel clamps  A Veress needle was placed and confirmed to be intraperitoneal using a saline drop test   The abdomen was insufflated to 15 mmHg intraperitoneal pressure  A 5 mm trocar was placed  The abdomen was inspected and found to be free of adhesions  An additional 11 mm trocar was placed in the epigastric area this was done after infiltration of local anesthesia and under direct visualization    Two additional 5 mm trocars were placed in the right upper quadrant in the same fashion  The patient was placed in reverse Trendelenburg position and rotated towards the left side  The fundus of the gallbladder was found to be tensely distended  The Veress needle was reintroduced into the right upper quadrant  The Veress needle was directly inserted into the gallbladder  Approximately 30 mL of clear fluid was aspirated from the gallbladder  The fundus of the gallbladder was then able to be grasped and elevated anteriorly and superiorly  Any adhesions to the gallbladder were taken down using blunt dissection and electrocautery  Chapincito's pouch was identified  The peritoneum surrounding Chapincito's pouch was incised  The cystic duct was identified  This was freed of all surrounding tissue  The cystic artery was also freed of all surrounding tissue  The critical view was obtained  Two clips were placed distally and 1 proximally on the cystic duct  The duct was divided between clips  The cystic artery was clipped and divided in the same manner  Hook electrocautery was then used to free the gallbladder from the hepatic fossa  The gallbladder was placed within a 10 mm Endo-Catch bag and removed through the epigastric port site  The hepatic fossa was inspected  Hemostasis was achieved using electrocautery  Fascia of the 11 mm trocar site was closed using a suture of 0 Vicryl with the laparoscopic suture Passer  All trocars were removed and the abdomen was desufflated  Skin was closed using 4 0 Vicryl in the subcuticular layer  All incisions were covered with skin glue  The patient tolerated the procedure well was transferred to recovery in stable condition  At the end the procedure all needle instrument sponge counts were correct x2     I was present for the entire procedure and A physician assistant was required during the procedure for retraction tissue handling,dissection and suturing    Patient Disposition:  PACU         SIGNATURE: Amara Birmingham DO  DATE: December 18, 2022  TIME: 9:38 AM

## 2022-12-18 NOTE — ASSESSMENT & PLAN NOTE
· After COVID this is her second episode she is on lifelong Eliquis no previous history of DVT    Has been evaluated by hematology with a hypercoagulable work-up that was negative eliquis to restart in 24 hours

## 2022-12-19 VITALS
WEIGHT: 226 LBS | DIASTOLIC BLOOD PRESSURE: 72 MMHG | SYSTOLIC BLOOD PRESSURE: 142 MMHG | HEIGHT: 68 IN | BODY MASS INDEX: 34.25 KG/M2 | RESPIRATION RATE: 18 BRPM | OXYGEN SATURATION: 96 % | TEMPERATURE: 97.9 F | HEART RATE: 89 BPM

## 2022-12-19 LAB
BASOPHILS # BLD AUTO: 0.02 THOUSANDS/ÂΜL (ref 0–0.1)
BASOPHILS NFR BLD AUTO: 0 % (ref 0–1)
EOSINOPHIL # BLD AUTO: 0 THOUSAND/ÂΜL (ref 0–0.61)
EOSINOPHIL NFR BLD AUTO: 0 % (ref 0–6)
ERYTHROCYTE [DISTWIDTH] IN BLOOD BY AUTOMATED COUNT: 14 % (ref 11.6–15.1)
GLUCOSE SERPL-MCNC: 127 MG/DL (ref 65–140)
GLUCOSE SERPL-MCNC: 159 MG/DL (ref 65–140)
HCT VFR BLD AUTO: 37.8 % (ref 34.8–46.1)
HGB BLD-MCNC: 12.6 G/DL (ref 11.5–15.4)
IMM GRANULOCYTES # BLD AUTO: 0.05 THOUSAND/UL (ref 0–0.2)
IMM GRANULOCYTES NFR BLD AUTO: 1 % (ref 0–2)
LYMPHOCYTES # BLD AUTO: 1 THOUSANDS/ÂΜL (ref 0.6–4.47)
LYMPHOCYTES NFR BLD AUTO: 11 % (ref 14–44)
MCH RBC QN AUTO: 30.3 PG (ref 26.8–34.3)
MCHC RBC AUTO-ENTMCNC: 33.3 G/DL (ref 31.4–37.4)
MCV RBC AUTO: 91 FL (ref 82–98)
MONOCYTES # BLD AUTO: 0.99 THOUSAND/ÂΜL (ref 0.17–1.22)
MONOCYTES NFR BLD AUTO: 11 % (ref 4–12)
NEUTROPHILS # BLD AUTO: 7.24 THOUSANDS/ÂΜL (ref 1.85–7.62)
NEUTS SEG NFR BLD AUTO: 77 % (ref 43–75)
NRBC BLD AUTO-RTO: 0 /100 WBCS
PLATELET # BLD AUTO: 217 THOUSANDS/UL (ref 149–390)
PMV BLD AUTO: 9.9 FL (ref 8.9–12.7)
RBC # BLD AUTO: 4.16 MILLION/UL (ref 3.81–5.12)
WBC # BLD AUTO: 9.3 THOUSAND/UL (ref 4.31–10.16)

## 2022-12-19 RX ORDER — ACETAMINOPHEN 325 MG/1
975 TABLET ORAL EVERY 6 HOURS PRN
Qty: 30 TABLET | Refills: 0 | Status: SHIPPED | OUTPATIENT
Start: 2022-12-19

## 2022-12-19 RX ORDER — IBUPROFEN 600 MG/1
600 TABLET ORAL EVERY 6 HOURS PRN
Qty: 30 TABLET | Refills: 0 | Status: SHIPPED | OUTPATIENT
Start: 2022-12-19

## 2022-12-19 RX ADMIN — APIXABAN 5 MG: 5 TABLET, FILM COATED ORAL at 08:35

## 2022-12-19 RX ADMIN — ENOXAPARIN SODIUM 40 MG: 40 INJECTION SUBCUTANEOUS at 08:34

## 2022-12-19 RX ADMIN — IBUPROFEN 600 MG: 600 TABLET ORAL at 06:37

## 2022-12-19 RX ADMIN — INSULIN LISPRO 1 UNITS: 100 INJECTION, SOLUTION INTRAVENOUS; SUBCUTANEOUS at 11:46

## 2022-12-19 RX ADMIN — LEVOTHYROXINE SODIUM 100 MCG: 100 TABLET ORAL at 05:07

## 2022-12-19 NOTE — NURSING NOTE
Patient discharged home today; IV removed intact, no bleeding noted; belongings returned and reviewed with patient; AVS printed and reviewed with patient; Patient transported to exit via wheelchair accompanied by PCA

## 2022-12-19 NOTE — PLAN OF CARE
Problem: NEUROSENSORY - ADULT  Goal: Achieves stable or improved neurological status  Description: INTERVENTIONS  - Monitor and report changes in neurological status  - Monitor vital signs such as temperature, blood pressure, glucose, and any other labs ordered   - Initiate measures to prevent increased intracranial pressure  - Monitor for seizure activity and implement precautions if appropriate      Outcome: Adequate for Discharge  Goal: Remains free of injury related to seizures activity  Description: INTERVENTIONS  - Maintain airway, patient safety  and administer oxygen as ordered  - Monitor patient for seizure activity, document and report duration and description of seizure to physician/advanced practitioner  - If seizure occurs,  ensure patient safety during seizure  - Reorient patient post seizure  - Seizure pads on all 4 side rails  - Instruct patient/family to notify RN of any seizure activity including if an aura is experienced  - Instruct patient/family to call for assistance with activity based on nursing assessment  - Administer anti-seizure medications if ordered    Outcome: Adequate for Discharge  Goal: Achieves maximal functionality and self care  Description: INTERVENTIONS  - Monitor swallowing and airway patency with patient fatigue and changes in neurological status  - Encourage and assist patient to increase activity and self care     - Encourage visually impaired, hearing impaired and aphasic patients to use assistive/communication devices  Outcome: Adequate for Discharge     Problem: CARDIOVASCULAR - ADULT  Goal: Maintains optimal cardiac output and hemodynamic stability  Description: INTERVENTIONS:  - Monitor I/O, vital signs and rhythm  - Monitor for S/S and trends of decreased cardiac output  - Administer and titrate ordered vasoactive medications to optimize hemodynamic stability  - Assess quality of pulses, skin color and temperature  - Assess for signs of decreased coronary artery perfusion  - Instruct patient to report change in severity of symptoms  Outcome: Adequate for Discharge  Goal: Absence of cardiac dysrhythmias or at baseline rhythm  Description: INTERVENTIONS:  - Continuous cardiac monitoring, vital signs, obtain 12 lead EKG if ordered  - Administer antiarrhythmic and heart rate control medications as ordered  - Monitor electrolytes and administer replacement therapy as ordered  Outcome: Adequate for Discharge     Problem: RESPIRATORY - ADULT  Goal: Achieves optimal ventilation and oxygenation  Description: INTERVENTIONS:  - Assess for changes in respiratory status  - Assess for changes in mentation and behavior  - Position to facilitate oxygenation and minimize respiratory effort  - Oxygen administered by appropriate delivery if ordered  - Initiate smoking cessation education as indicated  - Encourage broncho-pulmonary hygiene including cough, deep breathe, Incentive Spirometry  - Assess the need for suctioning and aspirate as needed  - Assess and instruct to report SOB or any respiratory difficulty  - Respiratory Therapy support as indicated  12/19/2022 0933 by Fidencio Rowe RN  Outcome: Adequate for Discharge  12/19/2022 0841 by Fidencio Rowe RN  Outcome: Progressing     Problem: GASTROINTESTINAL - ADULT  Goal: Minimal or absence of nausea and/or vomiting  Description: INTERVENTIONS:  - Administer IV fluids if ordered to ensure adequate hydration  - Maintain NPO status until nausea and vomiting are resolved  - Nasogastric tube if ordered  - Administer ordered antiemetic medications as needed  - Provide nonpharmacologic comfort measures as appropriate  - Advance diet as tolerated, if ordered  - Consider nutrition services referral to assist patient with adequate nutrition and appropriate food choices  12/19/2022 0933 by Fidencio Rowe RN  Outcome: Adequate for Discharge  12/19/2022 0841 by Fidencio Rowe RN  Outcome: Progressing  Goal: Maintains or returns to baseline bowel function  Description: INTERVENTIONS:  - Assess bowel function  - Encourage oral fluids to ensure adequate hydration  - Administer IV fluids if ordered to ensure adequate hydration  - Administer ordered medications as needed  - Encourage mobilization and activity  - Consider nutritional services referral to assist patient with adequate nutrition and appropriate food choices  12/19/2022 0933 by Karen Shane RN  Outcome: Adequate for Discharge  12/19/2022 0841 by Karen Shane RN  Outcome: Progressing  Goal: Maintains adequate nutritional intake  Description: INTERVENTIONS:  - Monitor percentage of each meal consumed  - Identify factors contributing to decreased intake, treat as appropriate  - Assist with meals as needed  - Monitor I&O, weight, and lab values if indicated  - Obtain nutrition services referral as needed  12/19/2022 0933 by Karen Shane RN  Outcome: Adequate for Discharge  12/19/2022 0841 by Karen Shane RN  Outcome: Progressing  Goal: Establish and maintain optimal ostomy function  Description: INTERVENTIONS:  - Assess bowel function  - Encourage oral fluids to ensure adequate hydration  - Administer IV fluids if ordered to ensure adequate hydration   - Administer ordered medications as needed  - Encourage mobilization and activity  - Nutrition services referral to assist patient with appropriate food choices  - Assess stoma site  - Consider wound care consult   Outcome: Adequate for Discharge  Goal: Oral mucous membranes remain intact  Description: INTERVENTIONS  - Assess oral mucosa and hygiene practices  - Implement preventative oral hygiene regimen  - Implement oral medicated treatments as ordered  - Initiate Nutrition services referral as needed  Outcome: Adequate for Discharge     Problem: GENITOURINARY - ADULT  Goal: Maintains or returns to baseline urinary function  Description: INTERVENTIONS:  - Assess urinary function  - Encourage oral fluids to ensure adequate hydration if ordered  - Administer IV fluids as ordered to ensure adequate hydration  - Administer ordered medications as needed  - Offer frequent toileting  - Follow urinary retention protocol if ordered  Outcome: Adequate for Discharge  Goal: Absence of urinary retention  Description: INTERVENTIONS:  - Assess patient’s ability to void and empty bladder  - Monitor I/O  - Bladder scan as needed  - Discuss with physician/AP medications to alleviate retention as needed  - Discuss catheterization for long term situations as appropriate  Outcome: Adequate for Discharge  Goal: Urinary catheter remains patent  Description: INTERVENTIONS:  - Assess patency of urinary catheter  - If patient has a chronic reich, consider changing catheter if non-functioning  - Follow guidelines for intermittent irrigation of non-functioning urinary catheter  Outcome: Adequate for Discharge     Problem: METABOLIC, FLUID AND ELECTROLYTES - ADULT  Goal: Electrolytes maintained within normal limits  Description: INTERVENTIONS:  - Monitor labs and assess patient for signs and symptoms of electrolyte imbalances  - Administer electrolyte replacement as ordered  - Monitor response to electrolyte replacements, including repeat lab results as appropriate  - Instruct patient on fluid and nutrition as appropriate  Outcome: Adequate for Discharge  Goal: Fluid balance maintained  Description: INTERVENTIONS:  - Monitor labs   - Monitor I/O and WT  - Instruct patient on fluid and nutrition as appropriate  - Assess for signs & symptoms of volume excess or deficit  Outcome: Adequate for Discharge  Goal: Glucose maintained within target range  Description: INTERVENTIONS:  - Monitor Blood Glucose as ordered  - Assess for signs and symptoms of hyperglycemia and hypoglycemia  - Administer ordered medications to maintain glucose within target range  - Assess nutritional intake and initiate nutrition service referral as needed  Outcome: Adequate for Discharge     Problem: SKIN/TISSUE INTEGRITY - ADULT  Goal: Skin Integrity remains intact(Skin Breakdown Prevention)  Description: Assess:  Bed Management:  -Have minimal linens on bed & keep smooth, unwrinkled  -Change linens as needed when moist or perspiring  -Avoid sitting or lying in one position for more than = hours while in bed      Toileting:  -Offer bedside commode    Activity  -Encourage activity and walks on unit  -Encourage or provide ROM exercises   -  -Use appropriate equipment to lift or move patient in bed      Skin Care:  -Avoid use of baby powder, tape, friction and shearing, hot water or constrictive clothing    -Do not massage red bony areas    Next Steps:    Outcome: Adequate for Discharge  Goal: Incision(s), wounds(s) or drain site(s) healing without S/S of infection  Description: INTERVENTIONS  - Assess and document dressing, incision, wound bed, drain sites and surrounding tissue  - Provide patient and family education    Outcome: Adequate for Discharge  Goal: Pressure injury heals and does not worsen  Description: Interventions:  - Implement low air loss mattress or specialty surface (Criteria met)  - Apply silicone foam dressing    - Apply fecal or urinary incontinence containment device   -  -   - Utilize friction reducing device or surface for transfers     - Consider nutrition services referral as needed  Outcome: Adequate for Discharge     Problem: HEMATOLOGIC - ADULT  Goal: Maintains hematologic stability  Description: INTERVENTIONS  - Assess for signs and symptoms of bleeding or hemorrhage  - Monitor labs  - Administer supportive blood products/factors as ordered and appropriate  Outcome: Adequate for Discharge     Problem: MUSCULOSKELETAL - ADULT  Goal: Maintain or return mobility to safest level of function  Description: INTERVENTIONS:  - Assess patient's ability to carry out ADLs; assess patient's baseline for ADL function and identify physical deficits which impact ability to perform ADLs (bathing, care of mouth/teeth, toileting, grooming, dressing, etc )  - Assess/evaluate cause of self-care deficits   - Assess range of motion  - Assess patient's mobility  - Assess patient's need for assistive devices and provide as appropriate  - Encourage maximum independence but intervene and supervise when necessary  - Involve family in performance of ADLs  - Assess for home care needs following discharge   - Consider OT consult to assist with ADL evaluation and planning for discharge  - Provide patient education as appropriate  Outcome: Adequate for Discharge  Goal: Maintain proper alignment of affected body part  Description: INTERVENTIONS:  - Support, maintain and protect limb and body alignment  - Provide patient/ family with appropriate education  Outcome: Adequate for Discharge

## 2022-12-19 NOTE — DISCHARGE SUMMARY
Discharge Summary - Tisha White 50 y o  female MRN: [id number]    Unit/Bed#: -01 Encounter: [id number]    Admission Date:   Admission Orders (From admission, onward)     Ordered        12/16/22 0804  Inpatient Admission  Once                        Admitting Diagnosis:  Biliary colic [D80 97]  Vomiting [R11 10]  Type 2 diabetes mellitus without complication, without long-term current use of insulin ([address] ) [E11 9]  Acute cholecystitis due to biliary calculus [K80 00]    HPI: Tisha White is a 50 y o  female who presents with epigastric abdominal pain, nausea, and vomiting  The patient states her symptoms developed suddenly around 6 PM last evening  The pain is only in the epigastric area  It does not radiate  The patient last vomited on presentation to the ER  She is now feeling improved after analgesics and antiemetics  She states she had a mild similar pain in the past   She has had no difficulty eating  She denies any fever or chills  The patient has a history of pulmonary embolism  This developed when she was diagnosed with COVID-19 in 2020  The patient currently takes Eliquis  Her last dose was approximately 930 last night  Will admit to surgical service for further treatment of acute cholecystitis  Medical consult for co-morbidities  We will treat with IV antibiotics and fluids and plan for laparoscopic cholecystectomy on 12/18/2022 after Eliquis has been held for 48 hours      Procedures Performed:   CHOLECYSTECTOMY LAPAROSCOPIC     Summary of Hospital Course:   Presented with above complaints  Underwent above procedure 48 hours after Eliquis last held  No perioperative complications  POD1 the patients pain was well controlled  She was tolerating regular diet and ambulating without issue  Voiding without issue  Afebrile VSS  Discharged home  Will f/u in [facility] office with Sheryle Fridge PAC in 2 weeks      Significant Findings, Care, Treatment and Services Provided:   Operative Findings:  Tensely distended gallbladder containing clear bile consistent with hydrops of the gallbladder, large gallstone impacted within the gallbladder neck    Complications: none    Discharge Diagnosis: Acute cholecystitis due to biliary calculus [K80 00]    Condition at Discharge: good     Discharge instructions/Information to patient and family:   See after visit summary for information provided to patient and family  Provisions for Follow-Up Care:  See after visit summary for information related to follow-up care and any pertinent home health orders  PCP: Sandor Closs, MD    Disposition: Home    Planned Readmission: No    Discharge Statement   I spent 20 minutes discharging the patient  This time was spent on the day of discharge  I had direct contact with the patient on the day of discharge  Additional documentation is required if more than 30 minutes were spent on discharge  Discharge Medications:  See after visit summary for reconciled discharge medications provided to patient and family          Brisa Singh

## 2022-12-19 NOTE — PLAN OF CARE
Problem: NEUROSENSORY - ADULT  Goal: Achieves stable or improved neurological status  Description: INTERVENTIONS  - Monitor and report changes in neurological status  - Monitor vital signs such as temperature, blood pressure, glucose, and any other labs ordered   - Initiate measures to prevent increased intracranial pressure  - Monitor for seizure activity and implement precautions if appropriate      Outcome: Progressing     Problem: CARDIOVASCULAR - ADULT  Goal: Maintains optimal cardiac output and hemodynamic stability  Description: INTERVENTIONS:  - Monitor I/O, vital signs and rhythm  - Monitor for S/S and trends of decreased cardiac output  - Administer and titrate ordered vasoactive medications to optimize hemodynamic stability  - Assess quality of pulses, skin color and temperature  - Assess for signs of decreased coronary artery perfusion  - Instruct patient to report change in severity of symptoms  Outcome: Progressing     Problem: RESPIRATORY - ADULT  Goal: Achieves optimal ventilation and oxygenation  Description: INTERVENTIONS:  - Assess for changes in respiratory status  - Assess for changes in mentation and behavior  - Position to facilitate oxygenation and minimize respiratory effort  - Oxygen administered by appropriate delivery if ordered  - Initiate smoking cessation education as indicated  - Encourage broncho-pulmonary hygiene including cough, deep breathe, Incentive Spirometry  - Assess the need for suctioning and aspirate as needed  - Assess and instruct to report SOB or any respiratory difficulty  - Respiratory Therapy support as indicated  Outcome: Progressing

## 2022-12-19 NOTE — APP STUDENT NOTE
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  Held 48hrs prior to surgery  - Incisions x 4  C/D/I  No erythema or bruising    - Afebrile, VSS  Mildly hypertensive 142/72  Range 127/64 - 163/77    - WBC increased 9 3 > 5 6, but still WNL  Likely reactive  - HgB stable 12 6 (12 4)  - Tolerating PO  - Ambulating without difficulty  - Urinating without difficulty    Plan:  - Pain/nausea control PRN  - Restart home dose apixaban today (12/19)  - Diet as tolerated  - Ambulation as tolerated  - Recommend outpatient follow-up w/PCP to assess HTN  - Likely DC today  - Follow up in office in 2 weeks    Subjective:   Patient sitting upright in bed  No acute changes overnight  Endorses mild pain around incisions that she rates at 3/10  Endorses passing flatus and states that she is tolerating PO intake without nausea or vomiting  Denies having a BM  Is endorsing a mild cough and sore throat, but denies fever, chills, headache, runny nose, and states that she feels as though it's improving  We discussed that her sore throat may be related to ET intubation  She states that she feels comfortable going home today  Objective:     Vitals: Blood pressure 142/72, pulse 68, temperature (!) 97 2 °F (36 2 °C), resp  rate 18, height 5' 8" (1 727 m), weight 103 kg (226 lb), SpO2 97 %  ,Body mass index is 34 36 kg/m²  Intake/Output Summary (Last 24 hours) at 12/19/2022 0805  Last data filed at 12/18/2022 1749  Gross per 24 hour   Intake 1415 ml   Output 600 ml   Net 815 ml       Physical Exam: General appearance: alert and oriented, in no acute distress  Nose: no discharge  Throat: Oral mucosa moist without erythema   Posterior oropharynx clear without erythema or exudate  Lungs: clear to auscultation bilaterally  Heart: regular rate and rhythm, S1, S2 normal, no murmur, click, rub or gallop  Abdomen: Normoactive bowel sounds x 4 quadrants  Soft, non-distended with mild tenderness at incision sites  Incisions x 4 C/D/I without erythema or bruising  Extremities: extremities normal, warm and well-perfused; no cyanosis, clubbing, or edema  Pulses: 2+ and symmetric  Skin: Skin color, texture, turgor normal  No rashes or lesions  Neurologic: Grossly normal     Invasive Devices     Peripheral Intravenous Line  Duration           Peripheral IV 12/16/22 Right Antecubital 3 days                Lab, Imaging and other studies: I have personally reviewed pertinent reports  VTE Pharmacologic Prophylaxis: Resume home dose Apixaban  Scheduled 0900    VTE Mechanical Prophylaxis: sequential compression device

## 2022-12-19 NOTE — DISCHARGE INSTR - AVS FIRST PAGE
Laparoscopic Cholecystectomy   WHAT YOU NEED TO KNOW:   Laparoscopic cholecystectomy is surgery to remove your gallbladder  DISCHARGE INSTRUCTIONS:   Call Dr Inis Bloch office at [phone] with any questions or concerns    Medicines: You may need any of the following:  Prescription pain medicine - Take as directed    You may also take tylenol as needed    Take your medicine as directed  Contact your healthcare provider if you think your medicine is not helping or if you have side effects  Tell her if you are allergic to any medicine  Keep a list of the medicines, vitamins, and herbs you take  Include the amounts, and when and why you take them  Bring the list or the pill bottles to follow-up visits  Carry your medicine list with you in case of an emergency  Follow up with your healthcare provider 2 weeks after surgery, or as directed:  Write down your questions so you remember to ask them during your visits  Wound care: You may shower and pat the incisions dry  Do not soak underwater for 2 weeks   What to eat after surgery: You may eat whatever you feel up to following surgery  You may notice diarrhea with fatty foods  Drink plenty of liquids  When to return to work and other activities:  No lifting, pushing, or pulling greater then 10lb for 2 weeks  Walk as much as possible  YOU may drive when you are comfortable enough to turn and press the brake without pain medication    Contact your healthcare provider if:   You have a fever over 101°F (38°C) or chills  You have pain or nausea that is not relieved by medicine  You have redness and swelling around your incisions, or blood or pus is leaking from your incisions  You are constipated or have diarrhea  Your skin or eyes are yellow, or your bowel movements are pale  You have questions or concerns about your surgery, condition, or care  Seek care immediately or call 911 if:   You cannot stop vomiting       Your bowel movements are black or bloody  You have pain in your abdomen and it is swollen or hard  Your arm or leg feels warm, tender, and painful  It may look swollen and red  You feel lightheaded, short of breath, and have chest pain  You cough up blood  © 2017 [address] Information is for End User's use only and may not be sold, redistributed or otherwise used for commercial purposes  All illustrations and images included in CareNotes® are the copyrighted property of A D A M [employer] Inc  or Tyshawn Araujo  The above information is an  only  It is not intended as medical advice for individual conditions or treatments  Talk to your doctor, nurse or pharmacist before following any medical regimen to see if it is safe and effective for you

## 2022-12-19 NOTE — PLAN OF CARE
Problem: RESPIRATORY - ADULT  Goal: Achieves optimal ventilation and oxygenation  Description: INTERVENTIONS:  - Assess for changes in respiratory status  - Assess for changes in mentation and behavior  - Position to facilitate oxygenation and minimize respiratory effort  - Oxygen administered by appropriate delivery if ordered  - Initiate smoking cessation education as indicated  - Encourage broncho-pulmonary hygiene including cough, deep breathe, Incentive Spirometry  - Assess the need for suctioning and aspirate as needed  - Assess and instruct to report SOB or any respiratory difficulty  - Respiratory Therapy support as indicated  Outcome: Progressing     Problem: GASTROINTESTINAL - ADULT  Goal: Minimal or absence of nausea and/or vomiting  Description: INTERVENTIONS:  - Administer IV fluids if ordered to ensure adequate hydration  - Maintain NPO status until nausea and vomiting are resolved  - Nasogastric tube if ordered  - Administer ordered antiemetic medications as needed  - Provide nonpharmacologic comfort measures as appropriate  - Advance diet as tolerated, if ordered  - Consider nutrition services referral to assist patient with adequate nutrition and appropriate food choices  Outcome: Progressing  Goal: Maintains or returns to baseline bowel function  Description: INTERVENTIONS:  - Assess bowel function  - Encourage oral fluids to ensure adequate hydration  - Administer IV fluids if ordered to ensure adequate hydration  - Administer ordered medications as needed  - Encourage mobilization and activity  - Consider nutritional services referral to assist patient with adequate nutrition and appropriate food choices  Outcome: Progressing  Goal: Maintains adequate nutritional intake  Description: INTERVENTIONS:  - Monitor percentage of each meal consumed  - Identify factors contributing to decreased intake, treat as appropriate  - Assist with meals as needed  - Monitor I&O, weight, and lab values if indicated  - Obtain nutrition services referral as needed  Outcome: Progressing

## 2022-12-20 NOTE — UTILIZATION REVIEW
NOTIFICATION OF ADMISSION DISCHARGE   This is a Notification of Discharge from [address]  Please be advised that this patient has been discharge from our facility  Below you will find the admission and discharge date and time including the patient’s disposition  UTILIZATION REVIEW CONTACT:  [address] [place]  Utilization   Network Utilization Review Department  Phone: [phone] x carefully listen to the prompts  All voicemails are confidential   Email: [email or web address]     ADMISSION INFORMATION  PRESENTATION DATE: 12/16/2022  4:31 AM  OBERVATION ADMISSION DATE:   INPATIENT ADMISSION DATE: 12/16/22  8:04 AM   DISCHARGE DATE: 12/19/2022 12:12 PM   DISPOSITION:Home/Self Care    IMPORTANT INFORMATION:  Send all requests for admission clinical reviews, approved or denied determinations and any other requests to dedicated fax number below belonging to the campus where the patient is receiving treatment   List of dedicated fax numbers:  [address] DENIALS (Administrative/Medical Necessity) [phone]   [address] (Maternity/NICU/Pediatrics) [phone]   [facility] [phone]   [place] [address] [phone]   [address] [phone]   220 [facility] [phone] 90 [facility] [phone]   [address] [place] [address] [phone]   [facility]  [phone]   [address] [facility] [phone]   412 [facility] [address] [facility] [phone]

## 2023-06-11 ENCOUNTER — OFFICE VISIT (OUTPATIENT)
Dept: URGENT CARE | Facility: CLINIC | Age: 48
End: 2023-06-11
Payer: COMMERCIAL

## 2023-06-11 VITALS
WEIGHT: 226 LBS | HEIGHT: 68 IN | SYSTOLIC BLOOD PRESSURE: 122 MMHG | DIASTOLIC BLOOD PRESSURE: 68 MMHG | OXYGEN SATURATION: 98 % | HEART RATE: 77 BPM | RESPIRATION RATE: 16 BRPM | BODY MASS INDEX: 34.25 KG/M2 | TEMPERATURE: 98.7 F
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
## 2023-06-11 DIAGNOSIS — T14.8XXA POSTTRAUMATIC WOUND INFECTION: Primary | ICD-10-CM

## 2023-06-11 DIAGNOSIS — L08.9 POSTTRAUMATIC WOUND INFECTION: Primary | ICD-10-CM

## 2023-06-11 PROCEDURE — 90471 IMMUNIZATION ADMIN: CPT | Performed by: NURSE PRACTITIONER

## 2023-06-11 PROCEDURE — 99214 OFFICE O/P EST MOD 30 MIN: CPT | Performed by: NURSE PRACTITIONER

## 2023-06-11 PROCEDURE — 90715 TDAP VACCINE 7 YRS/> IM: CPT

## 2023-06-11 RX ORDER — BLOOD SUGAR DIAGNOSTIC
STRIP MISCELLANEOUS
COMMUNITY
Start: 2023-06-04

## 2023-06-11 RX ORDER — CIPROFLOXACIN 500 MG/1
500 TABLET, FILM COATED ORAL EVERY 12 HOURS SCHEDULED
Qty: 14 TABLET | Refills: 0 | Status: SHIPPED | OUTPATIENT
Start: 2023-06-11 | End: 2023-06-18

## 2023-06-11 NOTE — PATIENT INSTRUCTIONS
You have been prescribed cipro for salt water wound injury  Take as prescribed  DO NOT take prednisone with this medication  Drink plenty of water  You are to use plain bacitracin only to the wound  Let it air dry as much as possible  No more peroxide   Take tylenol or motrin as able for pain  Have the wound rechecked by your PCP in 3-5 days    If you develop fevers, chills, worsening redness or pain - go to the ED

## 2023-06-11 NOTE — PROGRESS NOTES
[address] [employer]        NAME: Saurav Rodriguez is a 50 y o  female  : 1975    MRN: [id number]  DATE: 2023  TIME: 11:50 AM    Assessment and Plan   Posttraumatic wound infection [T14  8XXA, L08 9]  1  Posttraumatic wound infection  ciprofloxacin (CIPRO) 500 mg tablet    Tdap Vaccine greater than or equal to 6yo    left knee and left great toe       2  Fall, initial encounter  ciprofloxacin (CIPRO) 500 mg tablet    Tdap Vaccine greater than or equal to 6yo            Patient Instructions       Follow up with PCP in 3-5 days  Proceed to  ER if symptoms worsen  You have been prescribed cipro for salt water wound injury  Take as prescribed  DO NOT take prednisone with this medication  Drink plenty of water  You are to use plain bacitracin only to the wound  Let it air dry as much as possible  No more peroxide   Take tylenol or motrin as able for pain  Have the wound rechecked by your PCP in 3-5 days  If you develop fevers, chills, worsening redness or pain - go to the ED        Chief Complaint     Chief Complaint   Patient presents with   • Knee Pain     Left knee pain started last week when she fell on beach has a open  wound on knee          History of Present Illness       This is a 50year old female who states was at the beach last Tuesday and fell  She states she had an abrasion to the left knee and toe and now appear infected  She states she has been applying plain bacitracin, some type of antibiotic spray and H202  Denies fevers, chills, n/v/d, pregnancy  Review of Systems   Review of Systems   Constitutional: Negative  HENT: Negative  Eyes: Negative  Respiratory: Negative  Cardiovascular: Negative  Gastrointestinal: Negative  Endocrine: Negative  Genitourinary: Negative  Musculoskeletal: Negative  Skin: Positive for wound  Allergic/Immunologic: Negative  Neurological: Negative  Hematological: Negative  Psychiatric/Behavioral: Negative  Current Medications       Current Outpatient Medications:   •  ciprofloxacin (CIPRO) 500 mg tablet, Take 1 tablet (500 mg total) by mouth every 12 (twelve) hours for 7 days, Disp: 14 tablet, Rfl: 0  •  apixaban (ELIQUIS) 5 mg, Take 1 tablet (5 mg total) by mouth 2 (two) times a day, Disp: 60 each, Rfl: 0  •  atorvastatin (LIPITOR) 10 mg tablet, Take 10 mg by mouth daily, Disp: , Rfl:   •  cabergoline (DOSTINEX) 0 5 MG tablet, Take 0 25 mg by mouth once a week, Disp: , Rfl:   •  folic acid (FOLVITE) 1 mg tablet, Take by mouth daily, Disp: , Rfl:   •  ibuprofen (MOTRIN) 600 mg tablet, Take 1 tablet (600 mg total) by mouth every 6 (six) hours as needed for moderate pain, Disp: 30 tablet, Rfl: 0  •  levothyroxine 100 mcg tablet, Take 100 mcg by mouth see administration instructions Mon- sat, Disp: , Rfl:   •  levothyroxine 200 mcg tablet, Take 200 mcg by mouth once a week sundays, Disp: , Rfl:   •  metFORMIN (GLUCOPHAGE) 500 mg tablet, Take 500 mg by mouth 2 (two) times a day with meals, Disp: , Rfl:   •  methotrexate 2 5 mg tablet, Take 2 5 mg by mouth daily with breakfast, Disp: , Rfl:   •  OneTouch Verio test strip, , Disp: , Rfl:     Current Allergies     Allergies as of 06/11/2023 - Reviewed 06/11/2023   Allergen Reaction Noted   • Penicillins Hives 04/21/2018            The following portions of the patient's history were reviewed and updated as appropriate: allergies, current medications, past family history, past medical history, past social history, past surgical history and problem list      Past Medical History:   Diagnosis Date   • Diabetes mellitus ([address] )    • Disease of thyroid gland    • Pituitary mass ([address] )    • Pulmonary embolism ([address] )        Past Surgical History:   Procedure Laterality Date   • CHOLECYSTECTOMY LAPAROSCOPIC N/A 12/18/2022    Procedure: CHOLECYSTECTOMY LAPAROSCOPIC;  Surgeon: Toni French DO;  Location:  MAIN OR;  Service: General   • NO PAST SURGERIES "      Family History   Problem Relation Age of Onset   • Diabetes Mother    • No Known Problems Father          Medications have been verified  Objective   /68   Pulse 77   Temp 98 7 °F (37 1 °C)   Resp 16   Ht 5' 8\" (1 727 m)   Wt 103 kg (226 lb)   SpO2 98%   BMI 34 36 kg/m²   No LMP recorded  Patient has had an implant  Physical Exam     Physical Exam  Vitals and nursing note reviewed  Constitutional:       General: She is not in acute distress  Appearance: Normal appearance  She is obese  She is not ill-appearing or toxic-appearing  HENT:      Head: Normocephalic and atraumatic  Nose: Nose normal       Mouth/Throat:      Mouth: Mucous membranes are moist    Cardiovascular:      Rate and Rhythm: Normal rate  Pulses: Normal pulses  Pulmonary:      Effort: Pulmonary effort is normal    Musculoskeletal:         General: Normal range of motion  Cervical back: Normal range of motion  Skin:     General: Skin is warm and dry  Capillary Refill: Capillary refill takes less than 2 seconds  Neurological:      General: No focal deficit present  Mental Status: She is alert and oriented to person, place, and time  Psychiatric:         Mood and Affect: Mood normal          Behavior: Behavior normal          Thought Content:  Thought content normal          Judgment: Judgment normal                    "

## 2023-12-11 ENCOUNTER — OFFICE VISIT (OUTPATIENT)
Dept: URGENT CARE | Facility: CLINIC | Age: 48
End: 2023-12-11
Payer: COMMERCIAL

## 2023-12-11 VITALS
BODY MASS INDEX: 36.16 KG/M2 | WEIGHT: 225 LBS | DIASTOLIC BLOOD PRESSURE: 75 MMHG | TEMPERATURE: 97.7 F | OXYGEN SATURATION: 95 % | RESPIRATION RATE: 18 BRPM | HEIGHT: 66 IN | HEART RATE: 80 BPM | SYSTOLIC BLOOD PRESSURE: 134 MMHG

## 2023-12-11 DIAGNOSIS — J01.00 ACUTE NON-RECURRENT MAXILLARY SINUSITIS: Primary | ICD-10-CM

## 2023-12-11 DIAGNOSIS — H65.112 ACUTE MUCOID OTITIS MEDIA OF LEFT EAR: ICD-10-CM

## 2023-12-11 PROCEDURE — 99213 OFFICE O/P EST LOW 20 MIN: CPT | Performed by: PHYSICIAN ASSISTANT

## 2023-12-11 RX ORDER — PREDNISONE 10 MG/1
10 TABLET ORAL DAILY
Qty: 21 TABLET | Refills: 0 | Status: SHIPPED | OUTPATIENT
Start: 2023-12-11

## 2023-12-11 RX ORDER — AZITHROMYCIN 250 MG/1
TABLET, FILM COATED ORAL
Qty: 6 TABLET | Refills: 0 | Status: SHIPPED | OUTPATIENT
Start: 2023-12-11 | End: 2023-12-15

## 2023-12-11 NOTE — PROGRESS NOTES
[facility]        NAME: Alize Motley is a 46 y.o. female  : 1975    MRN: [id number]  DATE: 2023  TIME: 1:31 PM    Assessment and Plan   Acute non-recurrent maxillary sinusitis [J01.00]  1. Acute non-recurrent maxillary sinusitis  predniSONE 10 mg tablet    azithromycin (ZITHROMAX) 250 mg tablet      2. Acute mucoid otitis media of left ear  predniSONE 10 mg tablet    azithromycin (ZITHROMAX) 250 mg tablet            Patient Instructions   Medications as prescribed. Drink plenty of fluids. Follow up with PCP in 3-5 days. Proceed to  ER if symptoms worsen. Chief Complaint     Chief Complaint   Patient presents with    Cold Like Symptoms     Sinus pressure, headaches, and left ear for about a week and half          History of Present Illness       Patient is a 52-year-old female with significant past medical history of diabetes, PE, hypothyroidism presents the office complaining of headaches, sinus pain and pressure, congestion for 1.5 weeks. States she began with left ear pain this morning. Denies fevers, chills, or cough. Review of Systems   Review of Systems   Constitutional:  Negative for fever. HENT:  Positive for congestion, ear pain, postnasal drip and rhinorrhea. Negative for sore throat. Respiratory:  Negative for cough and shortness of breath. Cardiovascular:  Negative for chest pain. Gastrointestinal:  Negative for abdominal pain, diarrhea, nausea and vomiting. Skin:  Negative for rash. Neurological:  Positive for headaches. Negative for dizziness and light-headedness.          Current Medications       Current Outpatient Medications:     apixaban (ELIQUIS) 5 mg, Take 1 tablet (5 mg total) by mouth 2 (two) times a day, Disp: 60 each, Rfl: 0    atorvastatin (LIPITOR) 10 mg tablet, Take 10 mg by mouth daily, Disp: , Rfl:     azithromycin (ZITHROMAX) 250 mg tablet, Take 2 tablets today then 1 tablet daily x 4 days, Disp: 6 tablet, Rfl: 0    folic acid (FOLVITE) 1 mg tablet, Take by mouth daily, Disp: , Rfl:     levothyroxine 100 mcg tablet, Take 100 mcg by mouth see administration instructions Mon- sat, Disp: , Rfl:     levothyroxine 200 mcg tablet, Take 200 mcg by mouth once a week sundays, Disp: , Rfl:     metFORMIN (GLUCOPHAGE) 500 mg tablet, Take 500 mg by mouth 2 (two) times a day with meals, Disp: , Rfl:     methotrexate 2.5 mg tablet, Take 2.5 mg by mouth daily with breakfast, Disp: , Rfl:     OneTouch Verio test strip, , Disp: , Rfl:     predniSONE 10 mg tablet, Take 1 tablet (10 mg total) by mouth daily Take 6 on day 1, take 5 on day 2, take 4 on day 3, take 3 on day 4, take 2 on day 5, take 1 on day 6., Disp: 21 tablet, Rfl: 0    Current Allergies     Allergies as of 12/11/2023 - Reviewed 12/11/2023   Allergen Reaction Noted    Penicillins Hives 04/21/2018            The following portions of the patient's history were reviewed and updated as appropriate: allergies, current medications, past family history, past medical history, past social history, past surgical history and problem list.     Past Medical History:   Diagnosis Date    Diabetes mellitus ([address])     Disease of thyroid gland     Pituitary mass ([address])     Pulmonary embolism ([address])        Past Surgical History:   Procedure Laterality Date    CHOLECYSTECTOMY LAPAROSCOPIC N/A 12/18/2022    Procedure: CHOLECYSTECTOMY LAPAROSCOPIC;  Surgeon: Raquel Gramajo DO;  Location:  MAIN OR;  Service: General    NO PAST SURGERIES         Family History   Problem Relation Age of Onset    Diabetes Mother     No Known Problems Father          Medications have been verified. Objective   /75   Pulse 80   Temp 97.7 °F (36.5 °C) (Tympanic)   Resp 18   Ht 5' 6" (1.676 m)   Wt 102 kg (225 lb)   SpO2 95%   BMI 36.32 kg/m²   No LMP recorded. Patient has had an implant. Physical Exam     Physical Exam  Vitals and nursing note reviewed.    Constitutional:       Appearance: Normal appearance. She is well-developed. HENT:      Head: Normocephalic and atraumatic. Right Ear: Tympanic membrane, ear canal and external ear normal.      Left Ear: Ear canal and external ear normal. A middle ear effusion is present. Tympanic membrane is erythematous. Nose: Congestion present. Mouth/Throat:      Pharynx: Uvula midline. Eyes:      General: Lids are normal.      Conjunctiva/sclera: Conjunctivae normal.      Pupils: Pupils are equal, round, and reactive to light. Cardiovascular:      Rate and Rhythm: Normal rate and regular rhythm. Pulses: Normal pulses. Heart sounds: Normal heart sounds. No murmur heard. No friction rub. No gallop. Pulmonary:      Effort: Pulmonary effort is normal.      Breath sounds: Normal breath sounds. No wheezing, rhonchi or rales. Musculoskeletal:         General: Normal range of motion. Cervical back: Neck supple. Lymphadenopathy:      Cervical: No cervical adenopathy. Skin:     General: Skin is warm and dry. Capillary Refill: Capillary refill takes less than 2 seconds. Neurological:      Mental Status: She is alert.

## 2024-06-10 ENCOUNTER — TELEPHONE (OUTPATIENT)
Dept: OBGYN CLINIC | Facility: CLINIC | Age: 49
End: 2024-06-10

## 2024-06-21 RX ORDER — CABERGOLINE 0.5 MG/1
0.5 TABLET ORAL WEEKLY
COMMUNITY
Start: 2024-04-10

## 2024-06-21 RX ORDER — DIPHENOXYLATE HYDROCHLORIDE AND ATROPINE SULFATE 2.5; .025 MG/1; MG/1
1 TABLET ORAL DAILY
COMMUNITY

## 2024-06-24 ENCOUNTER — OFFICE VISIT (OUTPATIENT)
Dept: PODIATRY | Age: 49
End: 2024-06-24
Payer: COMMERCIAL

## 2024-06-24 ENCOUNTER — HOSPITAL ENCOUNTER (OUTPATIENT)
Dept: RADIOLOGY | Facility: CLINIC | Age: 49
Discharge: HOME | End: 2024-06-24
Payer: COMMERCIAL

## 2024-06-24 VITALS
BODY MASS INDEX: 36.16 KG/M2 | SYSTOLIC BLOOD PRESSURE: 114 MMHG | OXYGEN SATURATION: 96 % | WEIGHT: 225 LBS | TEMPERATURE: 97.8 F | DIASTOLIC BLOOD PRESSURE: 70 MMHG | HEIGHT: 66 IN | HEART RATE: 76 BPM

## 2024-06-24 DIAGNOSIS — M79.672 PAIN OF LEFT HEEL: ICD-10-CM

## 2024-06-24 DIAGNOSIS — M79.672 PAIN OF LEFT HEEL: Primary | ICD-10-CM

## 2024-06-24 PROCEDURE — 20550 NJX 1 TENDON SHEATH/LIGAMENT: CPT | Performed by: STUDENT IN AN ORGANIZED HEALTH CARE EDUCATION/TRAINING PROGRAM

## 2024-06-24 PROCEDURE — 73630 X-RAY EXAM OF FOOT: CPT

## 2024-06-24 PROCEDURE — 99203 OFFICE O/P NEW LOW 30 MIN: CPT | Performed by: STUDENT IN AN ORGANIZED HEALTH CARE EDUCATION/TRAINING PROGRAM

## 2024-06-24 RX ORDER — BETAMETHASONE SODIUM PHOSPHATE AND BETAMETHASONE ACETATE 3; 3 MG/ML; MG/ML
3 INJECTION, SUSPENSION INTRA-ARTICULAR; INTRALESIONAL; INTRAMUSCULAR; SOFT TISSUE
Status: SHIPPED | OUTPATIENT
Start: 2024-06-24

## 2024-06-24 RX ORDER — LIDOCAINE HYDROCHLORIDE 10 MG/ML
0.5 INJECTION, SOLUTION INFILTRATION; PERINEURAL
Status: SHIPPED | OUTPATIENT
Start: 2024-06-24

## 2024-06-24 RX ORDER — TRIAMCINOLONE ACETONIDE 40 MG/ML
40 INJECTION, SUSPENSION INTRA-ARTICULAR; INTRAMUSCULAR
Status: SHIPPED | OUTPATIENT
Start: 2024-06-24

## 2024-06-24 RX ADMIN — TRIAMCINOLONE ACETONIDE 40 MG: 40 INJECTION, SUSPENSION INTRA-ARTICULAR; INTRAMUSCULAR at 11:00

## 2024-06-24 RX ADMIN — LIDOCAINE HYDROCHLORIDE 0.5 ML: 10 INJECTION, SOLUTION INFILTRATION; PERINEURAL at 11:00

## 2024-06-24 RX ADMIN — BETAMETHASONE SODIUM PHOSPHATE AND BETAMETHASONE ACETATE 3 MG: 3; 3 INJECTION, SUSPENSION INTRA-ARTICULAR; INTRALESIONAL; INTRAMUSCULAR; SOFT TISSUE at 11:00

## 2024-06-24 NOTE — PROGRESS NOTES
Ambulatory Visit  Name: Odilia Christy      : 1975      MRN: [id number]  Encounter Provider: Danyelle Edward DPM  Encounter Date: 2024   Encounter department: [facility]ODI[facility] [place]    Assessment & Plan   1. Pain of left heel  -     XR foot 3+ vw left; Future; Expected date: 2024    Imaging Reviewed at this visit (I personally reviewed/independently interpreted images and reports in PACS)  XR left foot WB 3v 24: No acute osseous abnormalities noted. Plantar and posterior calcaneal spurs. Elevated 1st metatarsal.       IMPRESSION:  Left heel pain. Differential diagnosis include plantar fasciitis, Castillo's nerve entrapment, degenerative changes (calcaneal spurs, arthrosis of the joints of the foot), and inflammatory conditions of the ligaments and fascia of the foot and ankle.      PLAN:  I reviewed clinical exam and radiographic imaging (XR) with patient in detail today. I have discussed with the patient the pathophysiology of this diagnosis and reviewed how the examination correlates with this diagnosis.  I explained at length the biomechanical abnormalities leading to the significant overload of the plantar fascia. I stressed the importance of proper shoe gear and OTC arch supports to reposition foot to reduce tension on soft tissue structures and give cushion.  Instructions were given for conservative care which was also demonstrated during the clinical visit. I stressed the importance of achilles tendon stretching, icing and applying voltarin gel or biofreeze (OTC) at least 3x/day.    I recommend stiff bottomed sneakers/shoes (ex Asics, Vionic, New balance, Oliver, etc) for daily use and Donnie Freed (recovery slides) for in home use.   I advised pt to obtain OTC vasyli-Dananberg arch supports  Left heel injection performed as below  RTC 3 months      Foot/lower extremity injection    Performed by: Danyelle Edward DPM  Authorized by: Danyelle Edward DPM    Procedure:      Other Assisting Provider: No      Verbal consent obtained?: Yes      Risks and benefits: Risks, benefits and alternatives were discussed      Consent given by:  Patient    Time out: Immediately prior to the procedure a time out was called      Patient states understanding of procedure being performed: Yes      Patient's understanding of procedure matches consent: Yes      Patient identity confirmed:  Verbally with patient    Supporting Documentation:     Indications:  Pain    Procedure Details:                Ethyl Chloride was applied      Needle size: 27 G G    Approach:  Plantar    Laterality:  Left    Cyst Aspiration/Injection: No      Location: aponeurosis      Aponeurosis Structures: Plantar fascia origin      Injection Information:       Medications:  0.5 mL lidocaine 1 %; 3 mg betamethasone acetate-betamethasone sodium phosphate 6 (3-3) mg/mL; 40 mg triamcinolone acetonide 40 mg/mL    Patient tolerance:  Patient tolerated the procedure well with no immediate complications    Dressing: sterile dressing applied      Comments:      After reviewing risks (pain, steroid flare, infection, plantar fascial injury/tear, skin thinning) and obtaining verbal consent, I cleaned skin with alcohol and injected left heel with 0.5cc kenalog 40 + 0.5cc betamethasone + 0.5cc lidocaine plain.  Oral directions were given for rest and ice on the affected heel. The ice is to be used for approximately 20 minutes at a time, 1-2 times a day for a few days. The patient is to call at once if there is any increased pain, swelling, tenderness, redness, etc.        History of Present Illness     Odilia Christy is a 48 y.o. female who presents w/ 2mo h/o left heel pain. No trauma. Worse after rest or first step in AM. Better in shoes. H/o right heel pain with injection in past (4yrs ago) which worked well and is requesting this for left heel today.    Review of Systems   Constitutional:  Negative for activity change, chills and fever.   HENT:  "Negative.     Respiratory:  Negative for cough, chest tightness and shortness of breath.    Cardiovascular:  Negative for chest pain and leg swelling.   Endocrine: Negative.    Genitourinary: Negative.    Musculoskeletal:  Positive for gait problem.   Neurological:  Negative for numbness.   Psychiatric/Behavioral: Negative.  Negative for agitation and behavioral problems.        Objective     /70   Pulse 76   Temp 97.8 °F (36.6 °C) (Temporal)   Ht 5' 6\" (1.676 m)   Wt 102 kg (225 lb)   SpO2 96%   BMI 36.32 kg/m²     Physical Exam  Constitutional:       General: She is not in acute distress.     Appearance: Normal appearance. She is not ill-appearing.   Cardiovascular:      Pulses: Normal pulses.   Pulmonary:      Effort: Pulmonary effort is normal.   Musculoskeletal:         General: Tenderness (Left heel at insertion of plantar fascia to calcaneus.) present.      Comments: MMT 5/5 in all planes. Toe and ankle ROM intact and without pain.   Gastroc-soleal equinus.    Skin:     General: Skin is warm and dry.      Findings: No erythema or lesion.   Neurological:      General: No focal deficit present.      Mental Status: She is alert and oriented to person, place, and time.      Sensory: No sensory deficit.       Administrative Statements           "

## 2024-06-24 NOTE — PATIENT INSTRUCTIONS
Foot Pain Home Therapy    Stretch. Place painful foot in back with heel on ground and lean against wall. Do not lift heel off of ground. You will feel the stretch in the calf muscles and possibly the heel. Hold the stretch for 20-30 seconds. Do this at least 5-6 times per day. It is best done after exercise when your muscles are warm.  Wear supportive shoes at all times. Avoid flip-flops, flat sandals, barefoot walking (never walk barefoot, even at home). Generally avoid shoes that are too flexible and bend in the arch. Your shoes should only slightly bend in the toe area, not the middle. Running sneakers are often the best choice.  Supportive sneaker brands: Oliver, On Cloud, Hoka, Altra, New Balance, Asics, Mizuno  [employer] (discount if mention Dr ying)  Fleet Feet [place]  [facility]   [employer] [place]  Supportive daily shoe brands: Vionic, Orthofeet, Nelli, Dansko, Chacos, Courtney, Teva, Birkenstock  Supportive home shoes: Donnie Freed (recovery slides)  Purchase over the counter topical pain creams such as Voltarin gel, biofreeze, or CBD cream - will need to apply 2-3 times per day for benefit. + deep tissue massage.   Look in to over the counter shoe inserts/arch supports such as vasyli-Dananberg arch supports (take out tabs under 1st toe). These are all available on [employer].com as well as their individual website's.   [employer]: Vasyli+Dananberg 1st Ray Orthotic, Medium, 1st Ray Function, Medium Density, Full-Length Insole, Heat Molding Optional, Best All Around Orthotic, Functional Biomechanical Control for Pain Relief, Black Yellow (08689) : Health & Household        Achilles Tendon Stretching Exercises    A) Standing Gastrocnemius stretch  Place hands on wall or chair. If using wall, put your hands at eye level.  Step the leg you want to stretch behind you. Keep your back heel on the floor and point your toes straight ahead or slightly inward towards the heel of the  opposite foot.   Bend your knee toward the wall while keeping your back leg straight.  Lean toward the wall until you feel a gentle stretch in you calf of the straight leg. Don't lean so far that you feel pain.  Hold for 15 seconds. Complete 3 reps.    B) Standing soleus stretch  Place your hands on the wall or chair. If using wall, put your hands at eye level.  Step the leg you want to stretch behind you (your back foot will need to be closer to the front foot than the above stretch). Keep your back heel on the floor and point your toes straight ahead or slightly inward towards the heel of the opposite foot.   Bend both your front and back knee at the same time (may help to stick your butt out). You do not need to lean towards the wall, just bend the knees.   Lean toward the wall until you feel a gentle stretch in you calf of the straight leg. Don't lean so far that you feel pain.  Hold for 15 seconds. Complete 3 reps.          Keep these tips and tricks in mind to get the most out of your stretching;  Take your time - move slowly, whether you are deepening into a stretch or changing positions. This will limit the risk of injury & discomfort.  Avoid bouncing - quick sudden movements will only worsen achilles tendon issues. Stay relaxed during stretch.  Keep your heel down and toes straight ahead or slightly inward - this will allow the achilles tendon to stretch properly.   Stop if you feel pain - Don't strain or force your muscle. If you feel sharp pain, stop immediately.

## 2024-06-30 NOTE — PROGRESS NOTES
Assessment        Diagnoses and all orders for this visit:    Cervical cancer screening  -     Liquid-based pap, screening    Encounter for screening mammogram for breast cancer  -     Mammo screening bilateral w 3d & cad; Future    Amenorrhea  -     Follicle stimulating hormone; Future  -     US pelvis complete w transvaginal; Future    History of endometrial hyperplasia  -     Follicle stimulating hormone; Future  -     US pelvis complete w transvaginal; Future    Uncontrolled type 2 diabetes mellitus with hyperglycemia (HCC)    Type 2 diabetes mellitus without complication, without long-term current use of insulin (HCC)    IUD (intrauterine device) in place             Plan      All questions answered.  Await pap smear results.  Contraception: IUD.  Educational material distributed.  Follow up in 1 year.  Follow up as needed.  Mammogram.  Pap smear.  Pelvic ultrasound.   FSH was ordered to check menopausal hormone level  Pelvic ultrasound was ordered to check IUD placement and endometrial lining.  Patient with history of endometrial hyperplasia.  She is currently not bleeding no indication to perform endometrial biopsy at this time  Patient advised to maintain IUD for now.  Discussed duration of IUD use 5 to 8 years.  We will call patient with results    Subjective      Odilia Christy is a 48 y.o. female who presents for annual exam.      Chief Complaint   Patient presents with    New Patient Visit     [facility] care, yearly.      H/o saddle PE, on ELIQUIS for heavy menstrual cycles    H/o endometrial thickening on US  H/o endometrial hyperplasia simple 2005 and 2011 per patient has resolved, was on AYGESTIN in the past    She is not sexually active, she is a ,  had passed away last year at age 50, perforated ulcer.    She does not get a period, last period was in 2021/  She has not had an ultrasound recently.  She has no pain.      MIRENA IUD 2021 APRIL  Due to       Last Pap: 2/9/22  Last mammogram:  6/3/23  Colorectal cancer screenin[facility], every 5 years, polyps    Current contraception: abstinence  History of abnormal Pap smear: no  History of abnormal mammogram: no  Family history of uterine or ovarian cancer: no  Family history of breast cancer: no  Family history of colon cancer: no    OB History    Para Term  AB Living   0 0 0 0 0 0   SAB IAB Ectopic Multiple Live Births   0 0 0 0 0       Menstrual History:  OB History          0    Para   0    Term   0       0    AB   0    Living   0         SAB   0    IAB   0    Ectopic   0    Multiple   0    Live Births   0                Menarche age: 11  No LMP recorded. Patient has had an implant.             Past Medical History:   Diagnosis Date    Diabetes mellitus (HCC)     Disease of thyroid gland     Pituitary mass (HCC)     Pulmonary embolism (HCC)      Past Surgical History:   Procedure Laterality Date    CHOLECYSTECTOMY LAPAROSCOPIC N/A 2022    Procedure: CHOLECYSTECTOMY LAPAROSCOPIC;  Surgeon: Kelsey Vo DO;  Location:  MAIN OR;  Service: General    NO PAST SURGERIES       Family History   Problem Relation Age of Onset    Diabetes Mother     No Known Problems Father        Social History     Tobacco Use    Smoking status: Never    Smokeless tobacco: Never   Vaping Use    Vaping status: Never Used   Substance Use Topics    Alcohol use: Never    Drug use: Never          Current Outpatient Medications:     apixaban (ELIQUIS) 5 mg, Take 1 tablet (5 mg total) by mouth 2 (two) times a day, Disp: 60 each, Rfl: 0    atorvastatin (LIPITOR) 10 mg tablet, Take 10 mg by mouth daily, Disp: , Rfl:     cabergoline (DOSTINEX) 0.5 MG tablet, Take 0.5 mg by mouth once a week, Disp: , Rfl:     Diclofenac Sodium (VOLTAREN) 1 %, Apply 2 g topically 4 (four) times a day, Disp: , Rfl:     folic acid (FOLVITE) 1 mg tablet, Take by mouth daily, Disp: , Rfl:     Levonorgestrel (MIRENA) 20 MCG/DAY IUD, 1 each  "by Intrauterine route, Disp: , Rfl:     levothyroxine 100 mcg tablet, Take 100 mcg by mouth see administration instructions Mon- sat, Disp: , Rfl:     levothyroxine 200 mcg tablet, Take 200 mcg by mouth once a week sundays, Disp: , Rfl:     metFORMIN (GLUCOPHAGE) 500 mg tablet, Take 500 mg by mouth 2 (two) times a day with meals, Disp: , Rfl:     methotrexate 2.5 mg tablet, Take 2.5 mg by mouth daily with breakfast, Disp: , Rfl:     multivitamin (THERAGRAN) TABS, Take 1 tablet by mouth daily, Disp: , Rfl:     OneTouch Verio test strip, , Disp: , Rfl:     Current Facility-Administered Medications:     betamethasone acetate-betamethasone sodium phosphate (CELESTONE) injection 3 mg, 3 mg, Infiltration, , , 3 mg at 06/24/24 1100    lidocaine (XYLOCAINE) 1 % injection 0.5 mL, 0.5 mL, Infiltration, , , 0.5 mL at 06/24/24 1100    triamcinolone acetonide (KENALOG-40) 40 mg/mL injection 40 mg, 40 mg, Infiltration, , , 40 mg at 06/24/24 1100    Allergies   Allergen Reactions    Penicillins Hives           Review of Systems   Constitutional: Negative.    HENT: Negative.     Eyes: Negative.    Respiratory: Negative.     Cardiovascular: Negative.    Gastrointestinal: Negative.    Endocrine: Negative.    Genitourinary:         As noted in HPI   Musculoskeletal: Negative.    Skin: Negative.    Allergic/Immunologic: Negative.    Neurological: Negative.    Hematological: Negative.    Psychiatric/Behavioral: Negative.         /64 (BP Location: Right arm, Patient Position: Sitting, Cuff Size: Adult)   Pulse 96   Ht 5' 6\" (1.676 m)   Wt 102 kg (224 lb 3.2 oz)   SpO2 99%   BMI 36.19 kg/m²         Physical Exam  Constitutional:       Appearance: She is well-developed.   Genitourinary:      Vulva, bladder and rectum normal.      No lesions in the vagina.      Genitourinary Comments:         Right Labia: No rash, tenderness, lesions, skin changes or Bartholin's cyst.     Left Labia: No tenderness, lesions, skin changes, " Bartholin's cyst or rash.     No inguinal adenopathy present in the right or left side.     No vaginal discharge, tenderness or bleeding.      No vaginal prolapse present.     Mild vaginal atrophy present.       Right Adnexa: not tender, not full and no mass present.     Left Adnexa: not tender, not full and no mass present.     No cervical motion tenderness, friability, lesion or polyp.      IUD strings visualized.      Uterus is not enlarged or tender.      Pelvic exam was performed with patient in the lithotomy position.   Rectum:      No external hemorrhoid.   Breasts:     Right: No mass, nipple discharge, skin change or tenderness.      Left: No mass, nipple discharge, skin change or tenderness.   HENT:      Head: Normocephalic.      Nose: Nose normal.   Eyes:      Conjunctiva/sclera: Conjunctivae normal.   Neck:      Thyroid: No thyromegaly.   Cardiovascular:      Rate and Rhythm: Normal rate and regular rhythm.      Heart sounds: Normal heart sounds. No murmur heard.  Pulmonary:      Effort: Pulmonary effort is normal. No respiratory distress.      Breath sounds: Normal breath sounds. No wheezing or rales.   Abdominal:      General: There is no distension.      Palpations: Abdomen is soft. There is no mass.      Tenderness: There is no abdominal tenderness. There is no guarding or rebound.   Musculoskeletal:         General: No tenderness.      Cervical back: Neck supple. No muscular tenderness.   Lymphadenopathy:      Cervical: No cervical adenopathy.      Lower Body: No right inguinal adenopathy. No left inguinal adenopathy.   Neurological:      Mental Status: She is alert and oriented to person, place, and time.   Skin:     General: Skin is warm and dry.   Psychiatric:         Mood and Affect: Mood normal.         Behavior: Behavior normal.             Future Appointments   Date Time Provider Department Center   9/30/2024  3:15 PM Danyelle Edward DPM [facility]

## 2024-07-02 ENCOUNTER — OFFICE VISIT (OUTPATIENT)
Dept: OBGYN CLINIC | Facility: CLINIC | Age: 49
End: 2024-07-02
Payer: COMMERCIAL

## 2024-07-02 VITALS
HEIGHT: 66 IN | WEIGHT: 224.2 LBS | OXYGEN SATURATION: 99 % | BODY MASS INDEX: 36.03 KG/M2 | SYSTOLIC BLOOD PRESSURE: 122 MMHG | DIASTOLIC BLOOD PRESSURE: 64 MMHG | HEART RATE: 96 BPM

## 2024-07-02 DIAGNOSIS — Z87.42 HISTORY OF ENDOMETRIAL HYPERPLASIA: ICD-10-CM

## 2024-07-02 DIAGNOSIS — Z12.31 ENCOUNTER FOR SCREENING MAMMOGRAM FOR BREAST CANCER: ICD-10-CM

## 2024-07-02 DIAGNOSIS — Z12.4 CERVICAL CANCER SCREENING: Primary | ICD-10-CM

## 2024-07-02 DIAGNOSIS — E11.65 UNCONTROLLED TYPE 2 DIABETES MELLITUS WITH HYPERGLYCEMIA (HCC): ICD-10-CM

## 2024-07-02 DIAGNOSIS — E11.9 TYPE 2 DIABETES MELLITUS WITHOUT COMPLICATION, WITHOUT LONG-TERM CURRENT USE OF INSULIN (HCC): ICD-10-CM

## 2024-07-02 DIAGNOSIS — N91.2 AMENORRHEA: ICD-10-CM

## 2024-07-02 DIAGNOSIS — Z97.5 IUD (INTRAUTERINE DEVICE) IN PLACE: ICD-10-CM

## 2024-07-02 PROCEDURE — S0610 ANNUAL GYNECOLOGICAL EXAMINA: HCPCS | Performed by: OBSTETRICS & GYNECOLOGY

## 2024-07-02 PROCEDURE — G0145 SCR C/V CYTO,THINLAYER,RESCR: HCPCS | Performed by: OBSTETRICS & GYNECOLOGY

## 2024-07-02 PROCEDURE — G0476 HPV COMBO ASSAY CA SCREEN: HCPCS | Performed by: OBSTETRICS & GYNECOLOGY

## 2024-07-03 LAB
HPV HR 12 DNA CVX QL NAA+PROBE: NEGATIVE
HPV16 DNA CVX QL NAA+PROBE: NEGATIVE
HPV18 DNA CVX QL NAA+PROBE: NEGATIVE

## 2024-07-10 LAB
LAB AP GYN PRIMARY INTERPRETATION: NORMAL
Lab: NORMAL

## 2024-07-11 LAB — FSH SERPL-ACNC: 49.26 MIU/ML

## 2024-07-12 ENCOUNTER — HOSPITAL ENCOUNTER (OUTPATIENT)
Dept: ULTRASOUND IMAGING | Facility: HOSPITAL | Age: 49
End: 2024-07-12
Attending: OBSTETRICS & GYNECOLOGY
Payer: COMMERCIAL

## 2024-07-12 DIAGNOSIS — N91.2 AMENORRHEA: ICD-10-CM

## 2024-07-12 DIAGNOSIS — Z87.42 HISTORY OF ENDOMETRIAL HYPERPLASIA: ICD-10-CM

## 2024-07-12 PROCEDURE — 76830 TRANSVAGINAL US NON-OB: CPT

## 2024-07-12 PROCEDURE — 76856 US EXAM PELVIC COMPLETE: CPT

## 2024-07-17 ENCOUNTER — OFFICE VISIT (OUTPATIENT)
Dept: OBGYN CLINIC | Facility: CLINIC | Age: 49
End: 2024-07-17
Payer: COMMERCIAL

## 2024-07-17 ENCOUNTER — TELEPHONE (OUTPATIENT)
Dept: OBGYN CLINIC | Facility: CLINIC | Age: 49
End: 2024-07-17

## 2024-07-17 VITALS
HEART RATE: 78 BPM | TEMPERATURE: 97.8 F | OXYGEN SATURATION: 99 % | DIASTOLIC BLOOD PRESSURE: 78 MMHG | WEIGHT: 224 LBS | SYSTOLIC BLOOD PRESSURE: 132 MMHG | BODY MASS INDEX: 36 KG/M2 | HEIGHT: 66 IN

## 2024-07-17 DIAGNOSIS — M70.50 PES ANSERINE BURSITIS: ICD-10-CM

## 2024-07-17 DIAGNOSIS — M25.561 ACUTE PAIN OF RIGHT KNEE: Primary | ICD-10-CM

## 2024-07-17 PROCEDURE — 99204 OFFICE O/P NEW MOD 45 MIN: CPT | Performed by: STUDENT IN AN ORGANIZED HEALTH CARE EDUCATION/TRAINING PROGRAM

## 2024-07-17 PROCEDURE — 20610 DRAIN/INJ JOINT/BURSA W/O US: CPT | Performed by: STUDENT IN AN ORGANIZED HEALTH CARE EDUCATION/TRAINING PROGRAM

## 2024-07-17 RX ORDER — BUPIVACAINE HYDROCHLORIDE 2.5 MG/ML
0.5 INJECTION, SOLUTION INFILTRATION; PERINEURAL
Status: COMPLETED | OUTPATIENT
Start: 2024-07-17 | End: 2024-07-17

## 2024-07-17 RX ORDER — TRIAMCINOLONE ACETONIDE 40 MG/ML
20 INJECTION, SUSPENSION INTRA-ARTICULAR; INTRAMUSCULAR
Status: COMPLETED | OUTPATIENT
Start: 2024-07-17 | End: 2024-07-17

## 2024-07-17 RX ADMIN — BUPIVACAINE HYDROCHLORIDE 0.5 ML: 2.5 INJECTION, SOLUTION INFILTRATION; PERINEURAL at 17:00

## 2024-07-17 RX ADMIN — TRIAMCINOLONE ACETONIDE 20 MG: 40 INJECTION, SUSPENSION INTRA-ARTICULAR; INTRAMUSCULAR at 17:00

## 2024-07-17 NOTE — PATIENT INSTRUCTIONS
"Patient Education     Steroid injection   The Basics   Written by the doctors and editors at [facility]   What is a steroid injection? -- Steroids, also known as \"glucocorticoids,\" are medicines that help reduce swelling and pain. Doctors sometimes inject a steroid medicine into a joint or other part of the body to relieve pain. This is also sometimes called a \"cortisone shot.\"  After the injection, the steroid starts to work within a few days.  How long does a steroid injection work? -- It depends on the person and where the injection is given. For some people, the effects of a steroid injection can last for a few weeks or longer.  Sometimes, the doctor also injects a medicine called a \"local anesthetic\" with the steroid. This might help relieve pain until the steroid starts to work.  A steroid injection can help with pain, but it won't cure the problem that is causing the pain.  How do I prepare for a steroid injection? -- The doctor or nurse will tell you if you need to do anything special to prepare. Before your procedure, your doctor will do an exam.  Your doctor will also ask you about your \"health history.\" This involves asking you questions about any health problems you have or had in the past, past surgeries, and any medicines you take. Tell them about:   Any medicines you are taking - This includes any prescription or \"over-the-counter\" medicines you use, plus any herbal supplements you take. It helps to write down and bring a list of any medicines you take, or bring a bag with all of your medicines with you.   Any allergies you have   Any bleeding problems you have   Any other steroid injections you have had  Ask the doctor or nurse if you have questions or if there is anything you do not understand.  How is a steroid injection given? -- When it is time for the injection:   The doctor will clean the skin over the area where they plan to give the shot. (This is called the \"injection site.\")   They might use " ultrasound or a special kind of X-ray during the procedure. This is to check where to give the steroid.   Sometimes, they might give a shot of medicine to numb the skin.   They will inject the steroid.   Then, they will take out the needle and cover the injection site with a bandage.  What happens after a steroid injection? -- You can go home after the injection.  For the next few days, you might want to:   Apply a cold gel pack, bag of ice, or bag of frozen vegetables to the injection site every 1 to 2 hours, for 15 minutes each time. Put a thin towel between the ice (or other cold object) and your skin.   Rest the treated body part for a few days.   Take medicines to relieve pain. Examples include acetaminophen (sample brand name: Tylenol), ibuprofen (sample brand names: Advil, Motrin), or naproxen (sample brand name: Aleve).  If you have diabetes, the doctor might want you to check your blood sugar levels more often for a few days. The steroid medicine might temporarily raise your blood sugar.  What are the risks of a steroid injection? -- Your doctor will talk to you about all of the possible risks, and answer your questions. Possible risks include:   Bleeding, bruising, or soreness at the injection site   Damage to parts near the injection site - This might include cartilage damage, injury to nerves, tendon rupture, or thinning of skin and bones.   Skin around the injection site becoming lighter or whiter in color   Infection   Health conditions like diabetes or high blood pressure getting worse for a few days   Allergic reaction to the medicine  What else should I know? -- Most of the time, doctors limit the total number of steroid injections to a certain area.  A steroid injection might be only 1 part of your treatment plan. Take your other medicines as instructed. Also, follow your doctor's recommendations about other treatments. These might include things like physical therapy or devices like a brace or  cane.  All topics are updated as new evidence becomes available and our peer review process is complete.  This topic retrieved from [employer] on: Apr 25, 2024.  Topic 756786 Version 2.0  Release: 32.4.2 - C32.114  © 2024 [employer] and/or its affiliates. All rights reserved.  Consumer Information Use and Disclaimer   Disclaimer: This generalized information is a limited summary of diagnosis, treatment, and/or medication information. It is not meant to be comprehensive and should be used as a tool to help the user understand and/or assess potential diagnostic and treatment options. It does NOT include all information about conditions, treatments, medications, side effects, or risks that may apply to a specific patient. It is not intended to be medical advice or a substitute for the medical advice, diagnosis, or treatment of a health care provider based on the health care provider's examination and assessment of a patient's specific and unique circumstances. Patients must speak with a health care provider for complete information about their health, medical questions, and treatment options, including any risks or benefits regarding use of medications. This information does not endorse any treatments or medications as safe, effective, or approved for treating a specific patient. Up[employer] Inc[employer] and its affiliates disclaim any warranty or liability relating to this information or the use thereof.The use of this information is governed by the Terms of Use, available at https://www.[employer] Technologiesuwer.com/en/know/clinical-effectiveness-terms. 2024© Up[employer]Date, Inc. and its affiliates and/or licensors. All rights reserved.  Copyright   © 2024 [employer]Date, Inc. and/or its affiliates. All rights reserved.

## 2024-07-17 NOTE — PROGRESS NOTES
"1. Acute pain of right knee  Large joint arthrocentesis: R pes anserine bursa      2. Pes anserine bursitis  Large joint arthrocentesis: R pes anserine bursa        Orders Placed This Encounter   Procedures    Large joint arthrocentesis: R pes anserine bursa        Imaging Studies (I personally reviewed images in PACS and report):    X-ray right knee 6/10/2024: Via [facility].  No images available but there is report noting \"there is no fracture or dislocation.  There is no joint effusion.  There are no degenerative changes.\"  Venous duplex study of RLE 6/4/2024: via [facility]. No report/images available to review - per patient, she was told it was negative for DVT.    IMPRESSION:  Subacute right medial knee pain  Aggravated from kicking shovel into the ground while digging  Radiographs report unremarkable  Some relief with PT in regards to knee ROM; pain localized to pes anserine aspect of knee  Suspected pes anserine pain syndrome/bursitis; ddx OA pain of knee    Other factors:  BMI 36  On Eliquis for h/o PEs    PLAN:    Clinical exam and radiographic imaging reviewed with patient today, with impression as per above. I have discussed with the patient the pathophysiology of this diagnosis and reviewed how the examination correlates with this diagnosis.    Imaging obtained/reviewed as per above. I will follow up official radiology interpretation.  Treatment options were discussed at length, including risks and benefits; after discussing these treatment options, the patient elected for a pes anserine bursa cortisone injection as per procedure note below.  Continue formal physical therapy at this time.  Counseled other treatment modalities including heat/ice therapy 20 minutes on/off, acetaminophen.  Advised against oral NSAIDs given her history of Eliquis use as this can precipitate internal bleeding.  Can consider topical NSAIDs like Voltaren.    Return in about 2 weeks (around 7/31/2024) for Follow up right knee.      Portions " "of the record may have been created with voice recognition software. Occasional wrong word or \"sound a like\" substitutions may have occurred due to the inherent limitations of voice recognition software. Read the chart carefully and recognize, using context, where substitutions have occurred.     CHIEF COMPLAINT:  Chief Complaint   Patient presents with    Right Knee - Pain         HPI:  Odilia Christy is a 48 y.o. female  who presents for       Visit 7/17/2024 :  Initial evaluation of right knee pain:  Ongoing issue for the past 1 to 2 months  No precipitating injury but does recall a precipitating event where she was doing a lot of yard work and shoveling for hours. She reports kicking the shovel down to dig repetitively.  She states afterwards she was having difficulty with ascending/descending stairs and transitioning from sitting to standing without aggravating her medial sided knee pain.  Walking on a flat surface minimally aggravated pain.  She has gone to urgent care as well as seeing her PCP for this issue most recently on 5/16/2024.  She had imaging done already as noted above.  She underwent an ultrasound of her right lower extremity which ruled out a DVT (no   Patient states having similar pain in the past in which she underwent physical therapy for few months that alleviated her symptoms.  She was referred to PT and prescribed topical Voltaren gel. Reports no relief from topical Voltaren  Reports PT has progressively improved some of her symptoms in that she has more ROM of her knee (previously only was comfortable with knee in full extension) and the intensity/frequency is less but is still aggravated with ascending/descending stairs, squatting.  Of note, patient is on Eliquis due to a history of of PEs in the past        Medical, Surgical, Family, and Social History    Past Medical History:   Diagnosis Date    Diabetes mellitus (HCC)     Disease of thyroid gland     Pituitary mass (HCC)     Pulmonary " "embolism (HCC)      Past Surgical History:   Procedure Laterality Date    CHOLECYSTECTOMY LAPAROSCOPIC N/A 2022    Procedure: CHOLECYSTECTOMY LAPAROSCOPIC;  Surgeon: Kelsey Vo DO;  Location:  MAIN OR;  Service: General    NO PAST SURGERIES       Social History   Social History     Substance and Sexual Activity   Alcohol Use Never     Social History     Substance and Sexual Activity   Drug Use Never     Social History     Tobacco Use   Smoking Status Never   Smokeless Tobacco Never     Family History   Problem Relation Age of Onset    Diabetes Mother          at age 58    No Known Problems Father      Allergies   Allergen Reactions    Penicillins Hives          Physical Exam  /78   Pulse 78   Temp 97.8 °F (36.6 °C) (Temporal)   Ht 5' 6\" (1.676 m)   Wt 102 kg (224 lb)   SpO2 99%   BMI 36.15 kg/m²     Constitutional:  see vital signs  Gen: BMI 36, normocephalic/atraumatic, well-groomed  Eyes: No inflammation or discharge of conjunctiva or lids; sclera clear   Pulmonary/Chest: Effort normal. No respiratory distress.     Ortho Exam  Right Knee Exam:  Erythema: no  Swelling: no  Increased Warmth: no  Tenderness: +pes anserine bursa aspect of knee, mildly ttp over MJL  ROM: 0-110  Knee flexion strength: 5-/5  Knee extension strength: 5/5  Patellar Grind: +  Lachman's: negative  Anterior Drawer: negative  Varus laxity: negative  Valgus laxity: negative  Jeannie: negative     No calf tenderness to palpation       Large joint arthrocentesis: R pes anserine bursa  Universal Protocol:  Consent: Verbal consent obtained.  Risks and benefits: risks, benefits and alternatives were discussed  Consent given by: patient  Time out: Immediately prior to procedure a \"time out\" was called to verify the correct patient, procedure, equipment, support staff and site/side marked as required.  Patient understanding: patient states understanding of the procedure being performed  Site marked: the " operative site was marked  Radiology Images displayed and confirmed. If images not available, report reviewed: imaging studies available  Patient identity confirmed: verbally with patient  Supporting Documentation  Indications: pain   Procedure Details  Location: knee - R pes anserine bursa  Preparation: Patient was prepped and draped in the usual sterile fashion  Needle size: 22 G  Ultrasound guidance: no  Approach: anterolateral  Medications administered: 0.5 mL bupivacaine 0.25 %; 20 mg triamcinolone acetonide 40 mg/mL    Patient tolerance: patient tolerated the procedure well with no immediate complications  Dressing:  Sterile dressing applied

## 2024-07-17 NOTE — TELEPHONE ENCOUNTER
Spoke with patient and discussed that the IUD is low-lying and malpositioned and myometrial penetration cannot be excluded.  She is having pelvic discomfort and does request IUD to be removed.  He was advised to come in tomorrow at 915 for IUD removal.  Will message staff to add patient onto schedule

## 2024-07-18 ENCOUNTER — PROCEDURE VISIT (OUTPATIENT)
Dept: OBGYN CLINIC | Facility: CLINIC | Age: 49
End: 2024-07-18
Payer: COMMERCIAL

## 2024-07-18 VITALS
BODY MASS INDEX: 36 KG/M2 | SYSTOLIC BLOOD PRESSURE: 122 MMHG | HEART RATE: 72 BPM | OXYGEN SATURATION: 99 % | WEIGHT: 224 LBS | DIASTOLIC BLOOD PRESSURE: 72 MMHG | HEIGHT: 66 IN

## 2024-07-18 DIAGNOSIS — T83.84XA PAIN DUE TO INTRAUTERINE CONTRACEPTIVE DEVICE (IUD), INITIAL ENCOUNTER (HCC): Primary | ICD-10-CM

## 2024-07-18 DIAGNOSIS — T83.32XA MALPOSITIONED INTRAUTERINE DEVICE (IUD), INITIAL ENCOUNTER: ICD-10-CM

## 2024-07-18 DIAGNOSIS — Z30.432 ENCOUNTER FOR IUD REMOVAL: ICD-10-CM

## 2024-07-18 PROCEDURE — 58301 REMOVE INTRAUTERINE DEVICE: CPT | Performed by: OBSTETRICS & GYNECOLOGY

## 2024-07-18 NOTE — PROGRESS NOTES
Assessment/Plan:    Problem List Items Addressed This Visit    None  Visit Diagnoses       Pain due to intrauterine contraceptive device (IUD), initial encounter (HCC)    -  Primary    Malpositioned intrauterine device (IUD), initial encounter        Encounter for IUD removal              IUD was removed without difficulty.  Patient will call if with any problems or concerns.  Follow-up in July for annual GYN exam.       Subjective   Patient ID: Odilia Christy is a 48 y.o. female.  Patient is here for a follow-up.    Chief Complaint   Patient presents with    Procedure     IUD Removal   Patient presents for IUD removal.  FSH level is menopausal.  Endometrial lining is normal at 3 mm.  Patient not currently sexually active.  Patient with remote history of endometrial hyperplasia.  IUD was noted to be low-lying and malpositioned and she reports pain.  Here for IUD removal.    Menstrual History:  OB History          0    Para   0    Term   0       0    AB   0    Living   0         SAB   0    IAB   0    Ectopic   0    Multiple   0    Live Births   0               No LMP recorded. Patient has had an implant.         Past Medical History:   Diagnosis Date    Diabetes mellitus (HCC)     Disease of thyroid gland     Pituitary mass (HCC)     Pulmonary embolism (HCC)        Past Surgical History:   Procedure Laterality Date    CHOLECYSTECTOMY LAPAROSCOPIC N/A 2022    Procedure: CHOLECYSTECTOMY LAPAROSCOPIC;  Surgeon: Kelsey Vo DO;  Location:  [facility] OR;  Service: General    NO PAST SURGERIES         Social History     Tobacco Use    Smoking status: Never    Smokeless tobacco: Never   Vaping Use    Vaping status: Never Used   Substance Use Topics    Alcohol use: Never    Drug use: Never       Allergies   Allergen Reactions    Penicillins Hives         Current Outpatient Medications:     apixaban (ELIQUIS) 5 mg, Take 1 tablet (5 mg total) by mouth 2 (two) times a day, Disp: 60 each, Rfl:  "0    atorvastatin (LIPITOR) 10 mg tablet, Take 10 mg by mouth daily, Disp: , Rfl:     cabergoline (DOSTINEX) 0.5 MG tablet, Take 0.5 mg by mouth once a week, Disp: , Rfl:     Diclofenac Sodium (VOLTAREN) 1 %, Apply 2 g topically 4 (four) times a day, Disp: , Rfl:     folic acid (FOLVITE) 1 mg tablet, Take by mouth daily, Disp: , Rfl:     Levonorgestrel (MIRENA) 20 MCG/DAY IUD, 1 each by Intrauterine route, Disp: , Rfl:     levothyroxine 100 mcg tablet, Take 100 mcg by mouth see administration instructions Mon- sat, Disp: , Rfl:     levothyroxine 200 mcg tablet, Take 200 mcg by mouth once a week sundays, Disp: , Rfl:     metFORMIN (GLUCOPHAGE) 500 mg tablet, Take 500 mg by mouth 2 (two) times a day with meals, Disp: , Rfl:     methotrexate 2.5 mg tablet, Take 2.5 mg by mouth daily with breakfast, Disp: , Rfl:     multivitamin (THERAGRAN) TABS, Take 1 tablet by mouth daily, Disp: , Rfl:     OneTouch Verio test strip, , Disp: , Rfl:     Current Facility-Administered Medications:     betamethasone acetate-betamethasone sodium phosphate (CELESTONE) injection 3 mg, 3 mg, Infiltration, , , 3 mg at 06/24/24 1100    lidocaine (XYLOCAINE) 1 % injection 0.5 mL, 0.5 mL, Infiltration, , , 0.5 mL at 06/24/24 1100    triamcinolone acetonide (KENALOG-40) 40 mg/mL injection 40 mg, 40 mg, Infiltration, , , 40 mg at 06/24/24 1100    Pertinent laboratory testing, imaging studies and prior external records reviewed    Review of Systems   Constitutional: Negative.    HENT: Negative.     Eyes: Negative.    Respiratory: Negative.     Cardiovascular: Negative.    Gastrointestinal: Negative.    Endocrine: Negative.    Genitourinary:         As noted in HPI   Musculoskeletal: Negative.    Skin: Negative.    Allergic/Immunologic: Negative.    Neurological: Negative.    Hematological: Negative.    Psychiatric/Behavioral: Negative.           /72   Pulse 72   Ht 5' 6\" (1.676 m)   Wt 102 kg (224 lb)   SpO2 99%   BMI 36.15 kg/m² "       Physical Exam  Constitutional:       General: She is not in acute distress.     Appearance: She is well-developed.   Genitourinary:      Bladder and urethral meatus normal.      No lesions in the vagina.      Right Labia: No rash, tenderness, lesions, skin changes or Bartholin's cyst.     Left Labia: No tenderness, lesions, skin changes, Bartholin's cyst or rash.     No vaginal discharge, erythema, tenderness or bleeding.      No vaginal prolapse present.     No vaginal atrophy present.       Right Adnexa: not tender, not full and no mass present.     Left Adnexa: not tender, not full and no mass present.     No cervical polyp.      IUD strings visualized.      Uterus is not enlarged or tender.      No uterine mass detected.     Pelvic exam was performed with patient in the lithotomy position.   Rectum:      No tenderness.   Cardiovascular:      Rate and Rhythm: Normal rate.   Pulmonary:      Effort: Pulmonary effort is normal.   Abdominal:      General: There is no distension.      Palpations: Abdomen is soft.      Tenderness: There is no abdominal tenderness.   Neurological:      Mental Status: She is alert and oriented to person, place, and time.   Skin:     General: Skin is warm and dry.   Psychiatric:         Behavior: Behavior normal.           Future Appointments   Date Time Provider Department Center   7/31/2024 12:15 PM John Nolasco MD [facility]TH[facility]   9/17/2024  2:30 PM [facility]   9/30/2024  3:15 PM REBECCA Melgar POD [facility]       Study Result    Narrative & Impression   PELVIC ULTRASOUND, COMPLETE     INDICATION: The patient is 48 years old. N91.2: Amenorrhea, unspecified  Z87.42: Personal history of other diseases of the female genital tract. Mirena IUD x3 years; amenorrhea     COMPARISON: CT from 12/16/2022     TECHNIQUE: Transabdominal pelvic ultrasound was performed in sagittal and transverse planes with a curvilinear transducer. Additional  transvaginal imaging was performed to better evaluate the endometrium and ovaries. Imaging included volumetric sweeps as   well as traditional still imaging technique.     FINDINGS:     UTERUS:  The uterus is retroverted in position, measuring 5.8 x 2.6 x 3.4 cm.  The myometrium is mildly heterogeneous..  Nabothian cyst(s) present, cervix otherwise within normal limits.     ENDOMETRIUM:  The endometrial echo complex has an AP caliber of 3.0 mm. It is partially obscured by the IUD.  The endometrial canal is not thickened. IUD is present within the endometrial canal although may be somewhat low in position with the inferior aspect extending towards the endocervical canal. The arms of the IUD appear to extend towards the body of the   uterus rather than the fundus. There could be some myometrial penetration.     OVARIES/ADNEXA:  Right ovary: Not visualized     Left ovary: 1.6 x 1.7 x 1.1 cm. 1.5 mL.  Ovarian Doppler flow is within normal limits.  No suspicious ovarian or adnexal abnormality.     OTHER:  No free fluid or loculated fluid collections.     IMPRESSION:     IUD is somewhat low in the myometrium extending into the endocervical canal. Some myometrial penetration of the arms of the IUD is not excluded.     The study was marked in EPIC for significant notification.                          Workstation performed: [id number]

## 2024-07-18 NOTE — PROGRESS NOTES
Iud removal    Performed by: Kiley Alfonso MD  Authorized by: Kiley Alfonso MD    Procedure: IUD removal    Consent obtained by patient, parent, or legal power of  - including discussion of procedure risks and benefits, patient questions answered, and patient education provided.: yes    Reason for removal: malposition    Strings visualized: yes    IUD grasped by forceps: yes    IUD removed: yes    Date/Time of Removal:  7/18/2024 9:20 AM  Removed without complications: yes    IUD intact: yes

## 2024-07-31 ENCOUNTER — OFFICE VISIT (OUTPATIENT)
Dept: OBGYN CLINIC | Facility: CLINIC | Age: 49
End: 2024-07-31
Payer: COMMERCIAL

## 2024-07-31 VITALS
WEIGHT: 224 LBS | HEIGHT: 66 IN | OXYGEN SATURATION: 98 % | RESPIRATION RATE: 18 BRPM | DIASTOLIC BLOOD PRESSURE: 80 MMHG | BODY MASS INDEX: 36 KG/M2 | HEART RATE: 85 BPM | TEMPERATURE: 98 F | SYSTOLIC BLOOD PRESSURE: 130 MMHG

## 2024-07-31 DIAGNOSIS — M25.561 ACUTE PAIN OF RIGHT KNEE: Primary | ICD-10-CM

## 2024-07-31 DIAGNOSIS — S89.91XD RIGHT KNEE INJURY, SUBSEQUENT ENCOUNTER: ICD-10-CM

## 2024-07-31 DIAGNOSIS — M70.50 PES ANSERINE BURSITIS: ICD-10-CM

## 2024-07-31 PROCEDURE — 99213 OFFICE O/P EST LOW 20 MIN: CPT | Performed by: STUDENT IN AN ORGANIZED HEALTH CARE EDUCATION/TRAINING PROGRAM

## 2024-07-31 NOTE — PROGRESS NOTES
"1. Acute pain of right knee  MRI knee right  wo contrast      2. Pes anserine bursitis  MRI knee right  wo contrast      3. Right knee injury, subsequent encounter  MRI knee right  wo contrast          Orders Placed This Encounter   Procedures    MRI knee right  wo contrast        Imaging Studies (I personally reviewed images in PACS and report):    X-ray right knee 6/10/2024: Via [facility].  No images available but there is report noting \"there is no fracture or dislocation.  There is no joint effusion.  There are no degenerative changes.\"  Venous duplex study of RLE 6/4/2024: via [facility]. No report/images available to review - per patient, she was told it was negative for DVT.    IMPRESSION:  Subacute right medial knee pain  Aggravated from kicking shovel into the ground while digging  Radiographs report unremarkable  Suspected pes anserine pain syndrome/bursitis; ddx OA pain of knee, medial meniscal tear  Limited relief since last visit with formal PT as well as a pes anserine cortisone injection    Other factors:  BMI 36  On Eliquis for h/o PEs and cannot take oral NSAIDs  T2DM    PLAN:    Clinical exam and radiographic imaging reviewed with patient today, with impression as per above. I have discussed with the patient the pathophysiology of this diagnosis and reviewed how the examination correlates with this diagnosis.   Given her limited improvement from conservative treatments thus far, I ordered an MRI of her right knee without contrast for further evaluation.  Follow-up after MRI to determine treatment plan going forward.  In regards to formal physical therapy, I counseled she can either continue formal therapy or transition to a home exercise program as she states she is comfortable doing the exercises on her own at this point.  There was consideration for an intra-articular cortisone injection of her knee as well for diagnostic/therapeutic purposes but patient prefers that further imaging with an MRI first which is " "reasonable.  Continue formal physical therapy at this time.  Counseled other treatment modalities including heat/ice therapy 20 minutes on/off, acetaminophen.  Advised against oral NSAIDs given her history of Eliquis use as this can precipitate internal bleeding.  Can consider topical NSAIDs like Voltaren.    Return for follow up after MRI of right knee.      Portions of the record may have been created with voice recognition software. Occasional wrong word or \"sound a like\" substitutions may have occurred due to the inherent limitations of voice recognition software. Read the chart carefully and recognize, using context, where substitutions have occurred.     CHIEF COMPLAINT:  Chief Complaint   Patient presents with    Right Knee - Follow-up         HPI:  Odilia Christy is a 48 y.o. female  who presents for       Visit 7/31/2024:  Follow up right medial knee pain/injury  S/p pes anserine CSI on last visit 7/17/2024. Has been seeing formal PT with [facility].  Reports mild improvement since last visit.  She states the injection itself did reduce some intensity of pain but is not completely resolved.  She continues to report medial sided knee pain that radiates to her posterior knee and down her lower leg at times.  She states it is aggravated with prolonged standing and walking as well as pivoting on her right lower leg.  Describes as a tight sometimes sharp and aching pain.  Intensity can vary from mild to severe intensity and affect her gait function.  She reports a sense of stiffness of her knee and crepitus.  Denies any N/T of right lower extremity.  Denies any new injuries of her right knee since last visit.      Visit 7/17/2024 :  Initial evaluation of right knee pain:  Ongoing issue for the past 1 to 2 months  No precipitating injury but does recall a precipitating event where she was doing a lot of yard work and shoveling for hours. She reports kicking the shovel down to dig repetitively.  She states afterwards " she was having difficulty with ascending/descending stairs and transitioning from sitting to standing without aggravating her medial sided knee pain.  Walking on a flat surface minimally aggravated pain.  She has gone to urgent care as well as seeing her PCP for this issue most recently on 2024.  She had imaging done already as noted above.  She underwent an ultrasound of her right lower extremity which ruled out a DVT (no   Patient states having similar pain in the past in which she underwent physical therapy for few months that alleviated her symptoms.  She was referred to PT and prescribed topical Voltaren gel. Reports no relief from topical Voltaren  Reports PT has progressively improved some of her symptoms in that she has more ROM of her knee (previously only was comfortable with knee in full extension) and the intensity/frequency is less but is still aggravated with ascending/descending stairs, squatting.  Of note, patient is on Eliquis due to a history of of PEs in the past        Medical, Surgical, Family, and Social History    Past Medical History:   Diagnosis Date    Diabetes mellitus (HCC)     Disease of thyroid gland     Pituitary mass (HCC)     Pulmonary embolism (HCC)      Past Surgical History:   Procedure Laterality Date    CHOLECYSTECTOMY LAPAROSCOPIC N/A 2022    Procedure: CHOLECYSTECTOMY LAPAROSCOPIC;  Surgeon: Kelsey Vo DO;  Location:  [facility] OR;  Service: General    NO PAST SURGERIES       Social History   Social History     Substance and Sexual Activity   Alcohol Use Never     Social History     Substance and Sexual Activity   Drug Use Never     Social History     Tobacco Use   Smoking Status Never   Smokeless Tobacco Never     Family History   Problem Relation Age of Onset    Diabetes Mother          at age 58    No Known Problems Father      Allergies   Allergen Reactions    Penicillins Hives          Physical Exam  /80 (BP Location: Left arm, Patient  "Position: Sitting, Cuff Size: Adult)   Pulse 85   Temp 98 °F (36.7 °C)   Resp 18   Ht 5' 6\" (1.676 m)   Wt 102 kg (224 lb)   SpO2 98%   BMI 36.15 kg/m²     Constitutional:  see vital signs  Gen: BMI 36, normocephalic/atraumatic, well-groomed  Eyes: No inflammation or discharge of conjunctiva or lids; sclera clear   Pulmonary/Chest: Effort normal. No respiratory distress.     Ortho Exam  Right Knee Exam:  Erythema: no  Swelling: no  Increased Warmth: no  Tenderness: +pes anserine bursa aspect of knee, +mild MJL  ROM: 0-110  Knee flexion strength: 5-/5  Knee extension strength: 5/5  Patellar Grind: +  Lachman's: negative  Anterior Drawer: negative laxity but +pain  Varus laxity: negative  Valgus laxity: negative, but + pain  Jeannie: + pain when testing medial meniscus w/o clicking  Thessaly: +     No calf tenderness to palpation       Procedures        "

## 2024-08-15 ENCOUNTER — HOSPITAL ENCOUNTER (OUTPATIENT)
Dept: MRI IMAGING | Facility: HOSPITAL | Age: 49
End: 2024-08-15
Attending: STUDENT IN AN ORGANIZED HEALTH CARE EDUCATION/TRAINING PROGRAM
Payer: COMMERCIAL

## 2024-08-15 DIAGNOSIS — S89.91XD RIGHT KNEE INJURY, SUBSEQUENT ENCOUNTER: ICD-10-CM

## 2024-08-15 DIAGNOSIS — M70.50 PES ANSERINE BURSITIS: ICD-10-CM

## 2024-08-15 DIAGNOSIS — M25.561 ACUTE PAIN OF RIGHT KNEE: ICD-10-CM

## 2024-08-15 PROCEDURE — 73721 MRI JNT OF LWR EXTRE W/O DYE: CPT

## 2024-08-27 ENCOUNTER — OFFICE VISIT (OUTPATIENT)
Dept: OBGYN CLINIC | Facility: CLINIC | Age: 49
End: 2024-08-27
Payer: COMMERCIAL

## 2024-08-27 VITALS
BODY MASS INDEX: 36.96 KG/M2 | TEMPERATURE: 97.8 F | OXYGEN SATURATION: 97 % | WEIGHT: 230 LBS | HEART RATE: 74 BPM | HEIGHT: 66 IN | DIASTOLIC BLOOD PRESSURE: 80 MMHG | SYSTOLIC BLOOD PRESSURE: 118 MMHG

## 2024-08-27 DIAGNOSIS — M25.561 ACUTE PAIN OF RIGHT KNEE: ICD-10-CM

## 2024-08-27 DIAGNOSIS — S83.411D SPRAIN OF MEDIAL COLLATERAL LIGAMENT OF RIGHT KNEE, SUBSEQUENT ENCOUNTER: ICD-10-CM

## 2024-08-27 DIAGNOSIS — M17.11 PRIMARY OSTEOARTHRITIS OF RIGHT KNEE: ICD-10-CM

## 2024-08-27 DIAGNOSIS — S89.91XD RIGHT KNEE INJURY, SUBSEQUENT ENCOUNTER: Primary | ICD-10-CM

## 2024-08-27 DIAGNOSIS — M23.300 DEGENERATIVE TEAR OF LATERAL MENISCUS, RIGHT: ICD-10-CM

## 2024-08-27 PROCEDURE — 20610 DRAIN/INJ JOINT/BURSA W/O US: CPT | Performed by: STUDENT IN AN ORGANIZED HEALTH CARE EDUCATION/TRAINING PROGRAM

## 2024-08-27 PROCEDURE — 99213 OFFICE O/P EST LOW 20 MIN: CPT | Performed by: STUDENT IN AN ORGANIZED HEALTH CARE EDUCATION/TRAINING PROGRAM

## 2024-08-27 RX ORDER — TRIAMCINOLONE ACETONIDE 40 MG/ML
40 INJECTION, SUSPENSION INTRA-ARTICULAR; INTRAMUSCULAR
Status: COMPLETED | OUTPATIENT
Start: 2024-08-27 | End: 2024-08-27

## 2024-08-27 RX ORDER — BUPIVACAINE HYDROCHLORIDE 2.5 MG/ML
2 INJECTION, SOLUTION INFILTRATION; PERINEURAL
Status: COMPLETED | OUTPATIENT
Start: 2024-08-27 | End: 2024-08-27

## 2024-08-27 RX ADMIN — BUPIVACAINE HYDROCHLORIDE 2 ML: 2.5 INJECTION, SOLUTION INFILTRATION; PERINEURAL at 14:15

## 2024-08-27 RX ADMIN — TRIAMCINOLONE ACETONIDE 40 MG: 40 INJECTION, SUSPENSION INTRA-ARTICULAR; INTRAMUSCULAR at 14:15

## 2024-08-27 NOTE — PROGRESS NOTES
"1. Right knee injury, subsequent encounter  Brace    Large joint arthrocentesis: R knee      2. Acute pain of right knee  Brace    Large joint arthrocentesis: R knee      3. Primary osteoarthritis of right knee  Brace    Large joint arthrocentesis: R knee      4. Degenerative tear of lateral meniscus, right  Brace    Large joint arthrocentesis: R knee      5. Sprain of medial collateral ligament of right knee, subsequent encounter  Brace          Orders Placed This Encounter   Procedures    Large joint arthrocentesis: R knee    Brace        Imaging Studies (I personally reviewed images in PACS and report):    MRI right knee without contrast 8/15/2024:  1. partial tear of the proximal MCL  2.  Partial tear of the dorsal root lateral meniscus without flipped fragment.  Medial meniscus remains intact.  3.  Degenerative changes throughout her knee with full-thickness loss along the lateral patellar facet and lateral tibial plateau    X-ray right knee 6/10/2024: Via [facility].  No images available but there is report noting \"there is no fracture or dislocation.  There is no joint effusion.  There are no degenerative changes.\"  Venous duplex study of RLE 6/4/2024: via [facility]. No report/images available to review - per patient, she was told it was negative for DVT.    IMPRESSION:  Subacute right medial knee pain  Aggravated from kicking shovel into the ground while digging  Radiographs report unremarkable  Suspected pes anserine pain syndrome/bursitis; ddx OA pain of knee, medial meniscal tear  Limited relief from formal PT, pes anserine injection in the past.  Prefer to hold off intra-articular injection until MRI.  MRI revealing degenerative changes of her knee as well as a degenerative lateral meniscus tear and medial MCL sprain    Other factors:  BMI 37  On Eliquis for h/o PEs and cannot take oral NSAIDs  T2DM  History of psoriatic arthritis    PLAN:    Clinical exam and radiographic imaging reviewed with patient today, with " "impression as per above. I have discussed with the patient the pathophysiology of this diagnosis and reviewed how the examination correlates with this diagnosis.   MRI of her right knee reviewed as noted above.  I discussed that the degenerative changes of her lateral joint line likely contributed to her meniscus tear.  She does overall have degenerative changes of her knee that could suggest pain along with the sprain of her MCL.  Given she had already attended physical therapy for at least 6 weeks with some degree of relief but continues to have medial sided joint pain as well as her limited relief from a pes anserine injection, I did recommend she try an intra-articular cortisone injection of her knee for diagnostic/therapeutic purposes.  After reviewing risk/benefits she was agreeable as well of this procedure was done as per below.  We can always consider repeating this injection every 3 to 4 months or alternatively try a viscosupplementation injection of her intra-articular knee.  Alternatively, I could always obtain a second opinion from orthopedic surgery as well.  Hinged knee brace prescribed today to be used as needed during activities.  I counseled the goals eventually transition out of this brace as symptoms improve.    Return if symptoms worsen or fail to improve.      Portions of the record may have been created with voice recognition software. Occasional wrong word or \"sound a like\" substitutions may have occurred due to the inherent limitations of voice recognition software. Read the chart carefully and recognize, using context, where substitutions have occurred.     CHIEF COMPLAINT:  Chief Complaint   Patient presents with    Right Knee - Follow-up    Follow-up         HPI:  Odilia Christy is a 48 y.o. female  who presents for       Visit 8/27/2024:  Follow-up evaluation of right medial knee pain/injury  MRI was obtained since last visit as noted above  Patient reports she continues to have medial " sided knee pain but is now starting to radiate over to her lateral knee with activities of prolonged weightbearing and walking.  Denies any new injury since last visit.  Describes as a sharp/aching pain of moderate to severe intensity depending on how often she is on her right lower extremity.  She reports continued crepitus of her knee.  Denies any N/T of her right lower extremity.  Additionally, patient is asking for whether there is a brace available today to provide further support during her general activities of daily living.     Visit 7/31/2024:  Follow up right medial knee pain/injury  S/p pes anserine CSI on last visit 7/17/2024. Has been seeing formal PT with [facility].  Reports mild improvement since last visit.  She states the injection itself did reduce some intensity of pain but is not completely resolved.  She continues to report medial sided knee pain that radiates to her posterior knee and down her lower leg at times.  She states it is aggravated with prolonged standing and walking as well as pivoting on her right lower leg.  Describes as a tight sometimes sharp and aching pain.  Intensity can vary from mild to severe intensity and affect her gait function.  She reports a sense of stiffness of her knee and crepitus.  Denies any N/T of right lower extremity.  Denies any new injuries of her right knee since last visit.      Visit 7/17/2024 :  Initial evaluation of right knee pain:  Ongoing issue for the past 1 to 2 months  No precipitating injury but does recall a precipitating event where she was doing a lot of yard work and shoveling for hours. She reports kicking the shovel down to dig repetitively.  She states afterwards she was having difficulty with ascending/descending stairs and transitioning from sitting to standing without aggravating her medial sided knee pain.  Walking on a flat surface minimally aggravated pain.  She has gone to urgent care as well as seeing her PCP for this issue most  "recently on 2024.  She had imaging done already as noted above.  She underwent an ultrasound of her right lower extremity which ruled out a DVT (no   Patient states having similar pain in the past in which she underwent physical therapy for few months that alleviated her symptoms.  She was referred to PT and prescribed topical Voltaren gel. Reports no relief from topical Voltaren  Reports PT has progressively improved some of her symptoms in that she has more ROM of her knee (previously only was comfortable with knee in full extension) and the intensity/frequency is less but is still aggravated with ascending/descending stairs, squatting.  Of note, patient is on Eliquis due to a history of of PEs in the past        Medical, Surgical, Family, and Social History    Past Medical History:   Diagnosis Date    Diabetes mellitus (HCC)     Disease of thyroid gland     Pituitary mass (HCC)     Pulmonary embolism (HCC)      Past Surgical History:   Procedure Laterality Date    CHOLECYSTECTOMY LAPAROSCOPIC N/A 2022    Procedure: CHOLECYSTECTOMY LAPAROSCOPIC;  Surgeon: Kelsey Vo DO;  Location:  [facility] OR;  Service: General    NO PAST SURGERIES       Social History   Social History     Substance and Sexual Activity   Alcohol Use Never     Social History     Substance and Sexual Activity   Drug Use Never     Social History     Tobacco Use   Smoking Status Never   Smokeless Tobacco Never     Family History   Problem Relation Age of Onset    Diabetes Mother          at age 58    No Known Problems Father      Allergies   Allergen Reactions    Penicillins Hives          Physical Exam  /80 (BP Location: Left arm)   Pulse 74   Temp 97.8 °F (36.6 °C)   Ht 5' 6\" (1.676 m)   Wt 104 kg (230 lb)   SpO2 97%   BMI 37.12 kg/m²     Constitutional:  see vital signs  Gen: BMI 36, normocephalic/atraumatic, well-groomed  Eyes: No inflammation or discharge of conjunctiva or lids; sclera clear "   Pulmonary/Chest: Effort normal. No respiratory distress.     Ortho Exam  Right Knee Exam:  Erythema: no  Swelling: no  Increased Warmth: no  Tenderness: +mjl, pes anserine, lateral joint line  ROM: 0-110  Knee flexion strength: 5-/5  Knee extension strength: 5/5  Patellar Grind: +    No calf tenderness to palpation       Large joint arthrocentesis: R knee  [facility] Protocol:  procedure performed by consultantConsent: Verbal consent obtained.  Risks and benefits: risks, benefits and alternatives were discussed  Consent given by: patient  Patient understanding: patient states understanding of the procedure being performed  Site marked: the operative site was marked  Radiology Images displayed and confirmed. If images not available, report reviewed: imaging studies available  Patient identity confirmed: verbally with patient  Supporting Documentation  Indications: pain and diagnostic evaluation   Procedure Details  Location: knee - R knee  Preparation: Patient was prepped and draped in the usual sterile fashion  Needle size: 22 G  Ultrasound guidance: no  Approach: anterolateral  Medications administered: 40 mg triamcinolone acetonide 40 mg/mL; 2 mL bupivacaine 0.25 %    Patient tolerance: patient tolerated the procedure well with no immediate complications  Dressing:  Sterile dressing applied

## 2024-08-27 NOTE — PATIENT INSTRUCTIONS
"Patient Education     Steroid injection   The Basics   Written by the doctors and editors at [facility]   What is a steroid injection? -- Steroids, also known as \"glucocorticoids,\" are medicines that help reduce swelling and pain. Doctors sometimes inject a steroid medicine into a joint or other part of the body to relieve pain. This is also sometimes called a \"cortisone shot.\"  After the injection, the steroid starts to work within a few days.  How long does a steroid injection work? -- It depends on the person and where the injection is given. For some people, the effects of a steroid injection can last for a few weeks or longer.  Sometimes, the doctor also injects a medicine called a \"local anesthetic\" with the steroid. This might help relieve pain until the steroid starts to work.  A steroid injection can help with pain, but it won't cure the problem that is causing the pain.  How do I prepare for a steroid injection? -- The doctor or nurse will tell you if you need to do anything special to prepare. Before your procedure, your doctor will do an exam.  Your doctor will also ask you about your \"health history.\" This involves asking you questions about any health problems you have or had in the past, past surgeries, and any medicines you take. Tell them about:   Any medicines you are taking - This includes any prescription or \"over-the-counter\" medicines you use, plus any herbal supplements you take. It helps to write down and bring a list of any medicines you take, or bring a bag with all of your medicines with you.   Any allergies you have   Any bleeding problems you have   Any other steroid injections you have had  Ask the doctor or nurse if you have questions or if there is anything you do not understand.  How is a steroid injection given? -- When it is time for the injection:   The doctor will clean the skin over the area where they plan to give the shot. (This is called the \"injection site.\")   They might use " ultrasound or a special kind of X-ray during the procedure. This is to check where to give the steroid.   Sometimes, they might give a shot of medicine to numb the skin.   They will inject the steroid.   Then, they will take out the needle and cover the injection site with a bandage.  What happens after a steroid injection? -- You can go home after the injection.  For the next few days, you might want to:   Apply a cold gel pack, bag of ice, or bag of frozen vegetables to the injection site every 1 to 2 hours, for 15 minutes each time. Put a thin towel between the ice (or other cold object) and your skin.   Rest the treated body part for a few days.   Take medicines to relieve pain. Examples include acetaminophen (sample brand name: Tylenol), ibuprofen (sample brand names: Advil, Motrin), or naproxen (sample brand name: Aleve).  If you have diabetes, the doctor might want you to check your blood sugar levels more often for a few days. The steroid medicine might temporarily raise your blood sugar.  What are the risks of a steroid injection? -- Your doctor will talk to you about all of the possible risks, and answer your questions. Possible risks include:   Bleeding, bruising, or soreness at the injection site   Damage to parts near the injection site - This might include cartilage damage, injury to nerves, tendon rupture, or thinning of skin and bones.   Skin around the injection site becoming lighter or whiter in color   Infection   Health conditions like diabetes or high blood pressure getting worse for a few days   Allergic reaction to the medicine  What else should I know? -- Most of the time, doctors limit the total number of steroid injections to a certain area.  A steroid injection might be only 1 part of your treatment plan. Take your other medicines as instructed. Also, follow your doctor's recommendations about other treatments. These might include things like physical therapy or devices like a brace or  cane.  All topics are updated as new evidence becomes available and our peer review process is complete.  This topic retrieved from [employer] on: Apr 25, 2024.  Topic 701097 Version 2.0  Release: 32.4.2 - C32.114  © 2024 [employer] and/or its affiliates. All rights reserved.  Consumer Information Use and Disclaimer   Disclaimer: This generalized information is a limited summary of diagnosis, treatment, and/or medication information. It is not meant to be comprehensive and should be used as a tool to help the user understand and/or assess potential diagnostic and treatment options. It does NOT include all information about conditions, treatments, medications, side effects, or risks that may apply to a specific patient. It is not intended to be medical advice or a substitute for the medical advice, diagnosis, or treatment of a health care provider based on the health care provider's examination and assessment of a patient's specific and unique circumstances. Patients must speak with a health care provider for complete information about their health, medical questions, and treatment options, including any risks or benefits regarding use of medications. This information does not endorse any treatments or medications as safe, effective, or approved for treating a specific patient. [employer] and its affiliates disclaim any warranty or liability relating to this information or the use thereof.The use of this information is governed by the Terms of Use, available at https://www.woltersDreamHeartuwer.com/en/know/clinical-effectiveness-terms. 2024© Up[employer]Date, Inc. and its affiliates and/or licensors. All rights reserved.  Copyright   © 2024 [employer]Date, [employer] and/or its affiliates. All rights reserved.

## 2024-09-10 ENCOUNTER — APPOINTMENT (OUTPATIENT)
Dept: LAB | Facility: CLINIC | Age: 49
End: 2024-09-10
Payer: COMMERCIAL

## 2024-09-10 ENCOUNTER — OFFICE VISIT (OUTPATIENT)
Dept: ENDOCRINOLOGY | Facility: CLINIC | Age: 49
End: 2024-09-10
Payer: COMMERCIAL

## 2024-09-10 VITALS
DIASTOLIC BLOOD PRESSURE: 68 MMHG | WEIGHT: 230.2 LBS | HEIGHT: 66 IN | BODY MASS INDEX: 37 KG/M2 | HEART RATE: 64 BPM | TEMPERATURE: 97.3 F | SYSTOLIC BLOOD PRESSURE: 110 MMHG

## 2024-09-10 DIAGNOSIS — D35.2 PROLACTINOMA (HCC): Primary | ICD-10-CM

## 2024-09-10 DIAGNOSIS — D35.2 PITUITARY MICROADENOMA (HCC): ICD-10-CM

## 2024-09-10 PROBLEM — I26.02 ACUTE SADDLE PULMONARY EMBOLISM WITH ACUTE COR PULMONALE (HCC): Status: ACTIVE | Noted: 2021-01-27

## 2024-09-10 PROBLEM — E22.9 PITUITARY MICROADENOMA WITH HYPERPROLACTINEMIA (HCC): Status: ACTIVE | Noted: 2021-01-27

## 2024-09-10 LAB — PROLACTIN SERPL-MCNC: 1.33 NG/ML (ref 2.74–19.64)

## 2024-09-10 PROCEDURE — 84146 ASSAY OF PROLACTIN: CPT | Performed by: STUDENT IN AN ORGANIZED HEALTH CARE EDUCATION/TRAINING PROGRAM

## 2024-09-10 PROCEDURE — 99203 OFFICE O/P NEW LOW 30 MIN: CPT | Performed by: STUDENT IN AN ORGANIZED HEALTH CARE EDUCATION/TRAINING PROGRAM

## 2024-09-10 PROCEDURE — 36415 COLL VENOUS BLD VENIPUNCTURE: CPT | Performed by: STUDENT IN AN ORGANIZED HEALTH CARE EDUCATION/TRAINING PROGRAM

## 2024-09-10 NOTE — ASSESSMENT & PLAN NOTE
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 No biochemical of hormonal co-secretion on prior eval, including cortisol and IGF1. Patient is on cabergoline weekly and her last prolactin was in normal range. Last MRI 2016 negative for adenoma, intially 3-mm. Patient is post-menopausal. We will pursue withdrawal of cabergoline and check labs for prolactin. I will notify patient of result. We will monitor prolactin again in 3-mo off therapy. Will plan 12-mo follow up and consider re-imaging with MRI if worsening hyperprolactinemia

## 2024-09-17 ENCOUNTER — HOSPITAL ENCOUNTER (OUTPATIENT)
Dept: RADIOLOGY | Facility: CLINIC | Age: 49
Discharge: HOME | End: 2024-09-17
Attending: OBSTETRICS & GYNECOLOGY
Payer: COMMERCIAL

## 2024-09-17 VITALS — WEIGHT: 230 LBS | HEIGHT: 66 IN | BODY MASS INDEX: 36.96 KG/M2

## 2024-09-17 DIAGNOSIS — Z12.31 ENCOUNTER FOR SCREENING MAMMOGRAM FOR BREAST CANCER: ICD-10-CM

## 2024-09-17 PROCEDURE — 77067 SCR MAMMO BI INCL CAD: CPT

## 2024-09-17 PROCEDURE — 77063 BREAST TOMOSYNTHESIS BI: CPT

## 2024-09-30 ENCOUNTER — OFFICE VISIT (OUTPATIENT)
Dept: PODIATRY | Age: 49
End: 2024-09-30
Payer: COMMERCIAL

## 2024-09-30 VITALS
WEIGHT: 231.4 LBS | DIASTOLIC BLOOD PRESSURE: 82 MMHG | HEART RATE: 79 BPM | OXYGEN SATURATION: 99 % | SYSTOLIC BLOOD PRESSURE: 122 MMHG | HEIGHT: 66 IN | BODY MASS INDEX: 37.19 KG/M2 | TEMPERATURE: 97.8 F

## 2024-09-30 DIAGNOSIS — M79.672 PAIN OF LEFT HEEL: Primary | ICD-10-CM

## 2024-09-30 PROCEDURE — 99212 OFFICE O/P EST SF 10 MIN: CPT | Performed by: STUDENT IN AN ORGANIZED HEALTH CARE EDUCATION/TRAINING PROGRAM

## 2024-09-30 NOTE — PROGRESS NOTES
"Ambulatory Visit  Name: Odilia Christy      : 1975      MRN: [id number]  Encounter Provider: Danyelle Edward DPM  Encounter Date: 2024   Encounter department: [facility] [place]    Assessment & Plan   1. Pain of left heel  -     Ambulatory referral to Physical Therapy; Future      Prior Imaging   XR left foot WB 3v 24: No acute osseous abnormalities noted. Plantar and posterior calcaneal spurs. Elevated 1st metatarsal.       IMPRESSION:  Left heel pain. Differential diagnosis include plantar fasciitis, Castillo's nerve entrapment, degenerative changes (calcaneal spurs, arthrosis of the joints of the foot), and inflammatory conditions of the ligaments and fascia of the foot and ankle.      PLAN:  Left heel pain with some improvement with injection, new shoes, arch supports however pain is now mainly to direct plantar heel. PT rx'd given. Tuli's heel cup recommended.   C/w conservative care. I stressed the importance of achilles tendon stretching, icing and applying voltarin gel or biofreeze (OTC) at least 3x/day.    C/w stiff bottomed sneakers/shoes (ex Asics, Vionic, New balance, Oliver, etc) for daily use and Donnie Freed (recovery slides) for in home use.   C/w OTC vasyli-Dananberg arch supports  Tuli's heel cup recommended  MRI if not improvement next appt  RTC 3 months          History of Present Illness     Odilia Christy is a 49 y.o. female who presents w/ 2mo h/o left heel pain f/u. Pain improved to injection site but notes it continues to direct plantar heel. Did get Hoka sneakers and recommended arch supports.     Initial HPI \"No trauma. Worse after rest or first step in AM. Better in shoes. H/o right heel pain with injection in past (4yrs ago) which worked well and is requesting this for left heel today.\"    Review of Systems   Constitutional:  Negative for activity change, chills and fever.   HENT: Negative.     Respiratory:  Negative for cough, chest tightness and " "shortness of breath.    Cardiovascular:  Negative for chest pain and leg swelling.   Endocrine: Negative.    Genitourinary: Negative.    Musculoskeletal:  Positive for gait problem.   Neurological:  Negative for numbness.   Psychiatric/Behavioral: Negative.  Negative for agitation and behavioral problems.        Objective     /82 (BP Location: Left arm, Patient Position: Sitting, Cuff Size: Standard)   Pulse 79   Temp 97.8 °F (36.6 °C) (Temporal)   Ht 5' 6\" (1.676 m)   Wt 105 kg (231 lb 6.4 oz)   LMP 01/16/2021   SpO2 99%   BMI 37.35 kg/m²     Physical Exam  Constitutional:       General: She is not in acute distress.     Appearance: Normal appearance. She is not ill-appearing.   Cardiovascular:      Pulses: Normal pulses.   Pulmonary:      Effort: Pulmonary effort is normal.   Musculoskeletal:         General: Tenderness (Left heel at insertion of plantar fascia to calcaneus, central-plantar heel at fat pad) present.      Comments: MMT 5/5 in all planes. Toe and ankle ROM intact and without pain.   Gastroc-soleal equinus.    Skin:     General: Skin is warm and dry.      Findings: No erythema or lesion.   Neurological:      General: No focal deficit present.      Mental Status: She is alert and oriented to person, place, and time.      Sensory: No sensory deficit.       Administrative Statements           "

## 2024-10-15 NOTE — PROGRESS NOTES
Ambulatory Visit  Name: Odilia Christy      : 1975      MRN: [id number]  Encounter Provider: Gomez Booth, DO  Encounter Date: 9/10/2024   Encounter department: [facility]    Assessment & Plan   1. 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 No biochemical of hormonal co-secretion on prior eval, including cortisol and IGF1. Patient is on cabergoline weekly and her last prolactin was in normal range. Last MRI 2016 negative for adenoma, intially 3-mm. Patient is post-menopausal. We will pursue withdrawal of cabergoline and check labs for prolactin. I will notify patient of result. We will monitor prolactin again in 3-mo off therapy. Will plan 12-mo follow up and consider re-imaging with MRI if worsening hyperprolactinemia  Orders:  -     Prolactin  -     Prolactin; Future; Expected date: 12/10/2024  -     Prolactin; Future; Expected date: 09/10/2025  2. Pituitary microadenoma (HCC)  Assessment & Plan:  There is hx of microprolactinoma. No biochemical of hormonal co-secretion on prior eval, including cortisol and IGF1. Patient is on cabergoline weekly and her last prolactin was in normal range. Last MRI 2016 negative for adenoma, intially 3-mm. Patient is post-menopausal. We will pursue withdrawal of cabergoline and check labs for prolactin. I will notify patient of result. We will monitor prolactin again in 3-mo off therapy. Will plan 12-mo follow up and consider re-imaging with MRI if worsening hyperprolactinemia  Orders:  -     Prolactin  -     Prolactin; Future; Expected date: 12/10/2024    RTC 12-mo    History of Present Illness     Odilia Christy is a 49 y.o. female who presents for evaluation of pituitary microadenoma. She is transferring care from Dr. Christie @ [facility]. At the time, she was being evaluated for abnormal menses and infertility. She was found to have high prolactin and a pituitary microadenoma 3 mm. Prolactin  "levels normalized on treatment and pituitary microadenoma resolved on follow up MRI, last 2016. Patient is post-menopausal. She is presently on cabergoline 0.5 mg weekly. She has hx hypothyroidism and t2dm, both managed by PCP. Last TFTs wnl.     Review of Systems   Constitutional:  Negative for diaphoresis.   Eyes:  Negative for visual disturbance.   Endocrine: Negative for heat intolerance.   All other systems reviewed and are negative.      Objective     /68   Pulse 64   Temp (!) 97.3 °F (36.3 °C)   Ht 5' 6\" (1.676 m)   Wt 104 kg (230 lb 3.2 oz)   BMI 37.16 kg/m²     Physical Exam  Vitals reviewed.   Constitutional:       General: She is not in acute distress.     Appearance: Normal appearance.   HENT:      Head: Normocephalic and atraumatic.   Eyes:      General: No scleral icterus.     Conjunctiva/sclera: Conjunctivae normal.   Pulmonary:      Effort: Pulmonary effort is normal. No respiratory distress.   Musculoskeletal:         General: No deformity.      Cervical back: Normal range of motion.   Neurological:      General: No focal deficit present.      Mental Status: She is alert.   Psychiatric:         Mood and Affect: Mood normal.         Behavior: Behavior normal.       Component  Ref Range & Units 8/30/23  8:56 AM   Prolactin  ng/mL 1.3     Lab Results   Component Value Date    SDB5HQOBYRTK 3.188 01/26/2021    TSH 4.06 08/02/2024         6.7.2016    MRI of the pituitary gland and brain    Clinical history pituitary microadenoma    Technique multiple axial T2, FLAIR, GRE T2 and diffusion-weighted images of the  brain were obtained without contrast. These were followed by a 3-D T1 sequence  following the injection of 9 cc Gadovist gadolinium contrast. Also obtained  were a thin section pre-and post contrast T1-weighted sagittal and coronal  images through the sella turcica.    Findings: The ventricles are within normal limits in size for the patient's age  and are midline. There is no intra-or " extra-axial mass effect or abnormal  signal intensity within the brain parenchyma.    On diffusion imaging, there is no evidence of an acute infarction.    Following intravenous contrast, there is no abnormal enhancement within the  brain parenchyma.    The pituitary gland measures 3.9 mm in height. There is no focal low signal  intensity lesion within the gland. There is no focal upper convexity the  diaphragma sella or abnormal soft tissue within the superior sphenoid sinus.  The infundibulum is midline. There is no evidence of a parasellar or  suprasellar mass lesion. The optic chiasm has a normal configuration. There is  no evidence of a definite micro-or macroadenoma.    At the craniocervical junction, the cerebellar tonsils are in a normal position.    There is mild polypoid mucosal thickening in all the paranasal sinuses with  small polyps or retention cysts in the maxillary sinuses.    Impression    1. There is no evidence of a mass lesion or acute infarction.    2. There is no evidence of pituitary micro-or macroadenoma.        Administrative Statements            Previously negative

## 2024-10-16 ENCOUNTER — TELEPHONE (OUTPATIENT)
Dept: PODIATRY | Facility: CLINIC | Age: 49
End: 2024-10-16

## 2024-10-16 NOTE — TELEPHONE ENCOUNTER
Caller: Odilia Christy    Doctor and/or Office: Danyelle Edward DPM    #: [phone]    Escalation: Odilia will be going to [facility] for her PT.  She is going to print the order from her My Chart and take it with her.

## 2024-11-08 ENCOUNTER — TELEPHONE (OUTPATIENT)
Age: 49
End: 2024-11-08

## 2024-11-08 NOTE — TELEPHONE ENCOUNTER
Spoke to someone at [facility]. Forms were faxed over and a new fax number given for the [facility].

## 2024-11-08 NOTE — TELEPHONE ENCOUNTER
Caller: Jo/[facility]    Doctor and/or Office: Dr. Edward/Rosa    #: [phone]    Escalation: Care/Has faxed a plan of care for Dr's signature on 10/21 and 10/31 and has never rec'd it back. Please call her back at the number above to let her know if there is a better fax number as the forms are time sensitive and need to be signed within 30 days. Fax number we have is [phone]. Please return her call so she can get Dr the papers she needs signed. They are only in until 12 today, but will be back Monday morning in case you cannot call today. Thanks

## 2024-12-10 ENCOUNTER — OFFICE VISIT (OUTPATIENT)
Dept: PODIATRY | Age: 49
End: 2024-12-10
Payer: COMMERCIAL

## 2024-12-10 VITALS
OXYGEN SATURATION: 98 % | TEMPERATURE: 97.2 F | SYSTOLIC BLOOD PRESSURE: 128 MMHG | HEIGHT: 66 IN | BODY MASS INDEX: 37.67 KG/M2 | DIASTOLIC BLOOD PRESSURE: 80 MMHG | HEART RATE: 73 BPM | WEIGHT: 234.4 LBS

## 2024-12-10 DIAGNOSIS — B35.1 ONYCHOMYCOSIS: ICD-10-CM

## 2024-12-10 DIAGNOSIS — M79.672 PAIN OF LEFT HEEL: Primary | ICD-10-CM

## 2024-12-10 PROCEDURE — 99213 OFFICE O/P EST LOW 20 MIN: CPT | Performed by: STUDENT IN AN ORGANIZED HEALTH CARE EDUCATION/TRAINING PROGRAM

## 2024-12-10 NOTE — PROGRESS NOTES
"Ambulatory Visit  Name: Odilia Christy      : 1975      MRN: [id number]  Encounter Provider: Danyelle Edward DPM  Encounter Date: 12/10/2024   Encounter department: [facility] [place]    Assessment & Plan   1. Pain of left heel  -     MRI ankle/heel left  wo contrast; Future; Expected date: 12/10/2024  2. Onychomycosis        Prior Imaging   XR left foot WB 3v 24: No acute osseous abnormalities noted. Plantar and posterior calcaneal spurs. Elevated 1st metatarsal.       PLAN:  Left heel pain with some improvement with injection, new shoes, Tuli-s heel cup, arch supports however pain is now mainly to direct plantar heel. She has underwent 8wks of formal PT. MRI left heel ordered  C/w conservative care. I stressed the importance of achilles tendon stretching, icing and applying voltarin gel or biofreeze (OTC) at least 3x/day.    C/w stiff bottomed sneakers/shoes (ex Asics, Vionic, New balance, Oliver, etc) for daily use and Donnie Freed (recovery slides) for in home use.   C/w OTC vasyli-Dananberg arch supports  I discussed OTC options for nail fungus. She will trial homeopathic tea tree oil and bornw listerine soaks  RTC 1 month          History of Present Illness     Odilia Christy is a 49 y.o. female who presents for left heel pain f/u. Finished 8wks formal PT and doing HEP daily. Pain still present. Wants to ask about nail fungus tx as well.     Prior HPI \"Pain improved to injection site but notes it continues to direct plantar heel. Did get Hoka sneakers and recommended arch supports.\"     Initial HPI \"No trauma. Worse after rest or first step in AM. Better in shoes. H/o right heel pain with injection in past (4yrs ago) which worked well and is requesting this for left heel today.\"    Review of Systems   Constitutional:  Negative for activity change, chills and fever.   HENT: Negative.     Respiratory:  Negative for cough, chest tightness and shortness of breath.    Cardiovascular:  " "Negative for chest pain and leg swelling.   Endocrine: Negative.    Genitourinary: Negative.    Musculoskeletal:  Positive for gait problem.   Neurological:  Negative for numbness.   Psychiatric/Behavioral: Negative.  Negative for agitation and behavioral problems.        Objective     /80 (BP Location: Left arm, Patient Position: Sitting, Cuff Size: Large)   Pulse 73   Temp (!) 97.2 °F (36.2 °C) (Temporal)   Ht 5' 6\" (1.676 m)   Wt 106 kg (234 lb 6.4 oz)   LMP 01/16/2021   SpO2 98%   BMI 37.83 kg/m²     Physical Exam  Constitutional:       General: She is not in acute distress.     Appearance: Normal appearance. She is not ill-appearing.   Cardiovascular:      Pulses: Normal pulses.   Pulmonary:      Effort: Pulmonary effort is normal.   Musculoskeletal:         General: Tenderness (Left heel at insertion of plantar fascia to calcaneus, central-plantar heel at fat pad) present.      Comments: MMT 5/5 in all planes. Toe and ankle ROM intact and without pain.   Gastroc-soleal equinus.   Central band plantar fascial fibroma, not painful.    Skin:     General: Skin is warm and dry.      Findings: No erythema or lesion.      Comments: Mild left 1/3 toenail fungus.    Neurological:      General: No focal deficit present.      Mental Status: She is alert and oriented to person, place, and time.      Sensory: No sensory deficit.       Administrative Statements           "

## 2025-01-06 ENCOUNTER — HOSPITAL ENCOUNTER (OUTPATIENT)
Dept: MRI IMAGING | Facility: HOSPITAL | Age: 50
Discharge: HOME | End: 2025-01-06
Attending: STUDENT IN AN ORGANIZED HEALTH CARE EDUCATION/TRAINING PROGRAM
Payer: COMMERCIAL

## 2025-01-06 DIAGNOSIS — M79.672 PAIN OF LEFT HEEL: ICD-10-CM

## 2025-01-06 PROCEDURE — 73721 MRI JNT OF LWR EXTRE W/O DYE: CPT

## 2025-01-20 ENCOUNTER — TELEPHONE (OUTPATIENT)
Dept: PODIATRY | Age: 50
End: 2025-01-20

## 2025-01-20 DIAGNOSIS — D35.2 PROLACTINOMA (HCC): Primary | ICD-10-CM

## 2025-01-20 NOTE — TELEPHONE ENCOUNTER
Left message for patient to call back and reschedule 1/20/25 appointment with Dr. Edward due to the weather

## 2025-01-21 ENCOUNTER — OFFICE VISIT (OUTPATIENT)
Dept: PODIATRY | Age: 50
End: 2025-01-21
Payer: COMMERCIAL

## 2025-01-21 ENCOUNTER — TELEPHONE (OUTPATIENT)
Age: 50
End: 2025-01-21

## 2025-01-21 VITALS — WEIGHT: 231 LBS | BODY MASS INDEX: 37.12 KG/M2 | HEIGHT: 66 IN

## 2025-01-21 DIAGNOSIS — E11.9 TYPE 2 DIABETES MELLITUS WITHOUT COMPLICATION, WITHOUT LONG-TERM CURRENT USE OF INSULIN (HCC): ICD-10-CM

## 2025-01-21 DIAGNOSIS — M79.672 PAIN OF LEFT HEEL: Primary | ICD-10-CM

## 2025-01-21 DIAGNOSIS — M72.2 PLANTAR FASCIITIS: ICD-10-CM

## 2025-01-21 LAB — PROLACTIN SERPL-MCNC: 9.06 NG/ML

## 2025-01-21 PROCEDURE — 99213 OFFICE O/P EST LOW 20 MIN: CPT | Performed by: STUDENT IN AN ORGANIZED HEALTH CARE EDUCATION/TRAINING PROGRAM

## 2025-01-21 NOTE — TELEPHONE ENCOUNTER
Cherelle from [facility] called in stating that patient is at lab but she cannot see any lab orders in the system for her from Dr. Booth.    She provided me the lab's fax number of [phone] and I faxed the Prolactin lab ordered by Dr Booth yesterday.    She stood on the phone and confirmed she received the fax.       No further action needed at this time.

## 2025-01-21 NOTE — PROGRESS NOTES
"Ambulatory Visit  Name: Odilia Christy      : 1975      MRN: [id number]  Encounter Provider: Danyelle Edward DPM  Encounter Date: 2025   Encounter department: [facility] [place]    Assessment & Plan   1. Pain of left heel  2. Plantar fasciitis  3. Type 2 diabetes mellitus without complication, without long-term current use of insulin ([facility])      Imaging reviewed today:  MRI left ankle/heel 25: acute plantar fasciitis. Small retrocalcaneal bursitis.     Prior Imaging   XR left foot WB 3v 24: No acute osseous abnormalities noted. Plantar and posterior calcaneal spurs. Elevated 1st metatarsal.       PLAN:  Left heel pain with some improvement with injection, new shoes, Tules heel cup, arch supports however pain is now mainly to direct plantar heel on and off depending on activity. MRI left heel reviewed as above. I discussed further options including c/w conservative care, repeat CSI, surgery. She is starting a new more sedentary job and would like to watch area at this time.   C/w conservative care. I stressed the importance of achilles tendon stretching, icing and applying voltarin gel or biofreeze (OTC) at least 3x/day.    C/w stiff bottomed sneakers/shoes (ex Asics, Vionic, New balance, Oliver, etc) for daily use and Oofos, Hoka (recovery slides) for in home use.   C/w OTC vasyli-Dananberg arch supports  RTC prn        History of Present Illness     Odilia Christy is a 49 y.o. female who presents for left heel pain f/u. Had MRI. Notes pain continues on and off but she is starting a new job soon which has softer floors and allows her to be off feet more.      \"Finished 8wks formal PT and doing HEP daily. Pain still present. Wants to ask about nail fungus tx as well.\"     Prior HPI \"Pain improved to injection site but notes it continues to direct plantar heel. Did get Hoka sneakers and recommended arch supports.\"     Initial HPI \"No trauma. Worse after rest or first step in AM. " "Better in shoes. H/o right heel pain with injection in past (4yrs ago) which worked well and is requesting this for left heel today.\"    Review of Systems   Constitutional:  Negative for activity change, chills and fever.   HENT: Negative.     Respiratory:  Negative for cough, chest tightness and shortness of breath.    Cardiovascular:  Negative for chest pain and leg swelling.   Endocrine: Negative.    Genitourinary: Negative.    Musculoskeletal:  Positive for gait problem.   Neurological:  Negative for numbness.   Psychiatric/Behavioral: Negative.  Negative for agitation and behavioral problems.        Objective     Ht 5' 6\" (1.676 m)   Wt 105 kg (231 lb)   LMP 01/16/2021   BMI 37.28 kg/m²     Physical Exam  Constitutional:       General: She is not in acute distress.     Appearance: Normal appearance. She is not ill-appearing.   Cardiovascular:      Pulses: Normal pulses.   Pulmonary:      Effort: Pulmonary effort is normal.   Musculoskeletal:         General: Tenderness (Left heel at insertion of plantar fascia to calcaneus, central-plantar heel at fat pad) present.      Comments: MMT 5/5 in all planes. Toe and ankle ROM intact and without pain.   Gastroc-soleal equinus.   Central band plantar fascial fibroma, not painful.    Skin:     General: Skin is warm and dry.      Findings: No erythema or lesion.      Comments: Mild left 1/3 toenail fungus.    Neurological:      General: No focal deficit present.      Mental Status: She is alert and oriented to person, place, and time.      Sensory: No sensory deficit.       Administrative Statements           "

## 2025-01-22 ENCOUNTER — RESULTS FOLLOW-UP (OUTPATIENT)
Dept: ENDOCRINOLOGY | Facility: CLINIC | Age: 50
End: 2025-01-22

## (undated) DEVICE — ENDOPOUCH RETRIEVER SPECIMEN RETRIEVAL BAGS: Brand: ENDOPOUCH RETRIEVER

## (undated) DEVICE — LIGACLIP 10-M/L, 10MM ENDOSCOPIC ROTATING MULTIPLE CLIP APPLIERS: Brand: LIGACLIP

## (undated) DEVICE — PENCIL ELECTROSURG E-Z CLEAN -0035H

## (undated) DEVICE — SINGLE PORT MANIFOLD: Brand: NEPTUNE 2

## (undated) DEVICE — TROCAR: Brand: KII FIOS FIRST ENTRY

## (undated) DEVICE — CHLORAPREP HI-LITE 26ML ORANGE

## (undated) DEVICE — PMI DISPOSABLE PUNCTURE CLOSURE DEVICE / SUTURE GRASPER: Brand: PMI

## (undated) DEVICE — VISUALIZATION SYSTEM: Brand: CLEARIFY

## (undated) DEVICE — VIAL DECANTER

## (undated) DEVICE — ENDOPATH PNEUMONEEDLE INSUFFLATION NEEDLES WITH LUER LOCK CONNECTORS 120MM: Brand: ENDOPATH

## (undated) DEVICE — ALLENTOWN LAP CHOLE APP PACK: Brand: CARDINAL HEALTH

## (undated) DEVICE — SUT VICRYL 4-0 PS-2 27 IN J426H

## (undated) DEVICE — ELECTRODE LAP J HOOK E-Z CLEAN 33CM-0021

## (undated) DEVICE — TROCAR: Brand: KII SLEEVE

## (undated) DEVICE — ADHESIVE SKIN HIGH VISCOSITY EXOFIN 1ML

## (undated) DEVICE — GLOVE SRG BIOGEL 6

## (undated) DEVICE — INTENDED FOR TISSUE SEPARATION, AND OTHER PROCEDURES THAT REQUIRE A SHARP SURGICAL BLADE TO PUNCTURE OR CUT.: Brand: BARD-PARKER SAFETY BLADES SIZE 11, STERILE

## (undated) DEVICE — SUT VICRYL 0 REEL 54 IN J287G

## (undated) DEVICE — GLOVE INDICATOR PI UNDERGLOVE SZ 6.5 BLUE

## (undated) DEVICE — PAD GROUNDING ADULT

## (undated) DEVICE — TUBING INSUFFLATION SET ISO CONNECTOR